# Patient Record
Sex: MALE | Race: BLACK OR AFRICAN AMERICAN | NOT HISPANIC OR LATINO | URBAN - METROPOLITAN AREA
[De-identification: names, ages, dates, MRNs, and addresses within clinical notes are randomized per-mention and may not be internally consistent; named-entity substitution may affect disease eponyms.]

---

## 2017-02-19 ENCOUNTER — EMERGENCY (EMERGENCY)
Facility: HOSPITAL | Age: 42
LOS: 0 days | Discharge: HOME | End: 2017-02-19

## 2017-06-27 DIAGNOSIS — I10 ESSENTIAL (PRIMARY) HYPERTENSION: ICD-10-CM

## 2017-06-27 DIAGNOSIS — M54.2 CERVICALGIA: ICD-10-CM

## 2017-06-27 DIAGNOSIS — M25.511 PAIN IN RIGHT SHOULDER: ICD-10-CM

## 2018-07-29 ENCOUNTER — EMERGENCY (EMERGENCY)
Facility: HOSPITAL | Age: 43
LOS: 0 days | Discharge: HOME | End: 2018-07-29
Admitting: PHYSICIAN ASSISTANT
Payer: SUBSIDIZED

## 2018-07-29 VITALS
OXYGEN SATURATION: 99 % | TEMPERATURE: 97 F | DIASTOLIC BLOOD PRESSURE: 118 MMHG | SYSTOLIC BLOOD PRESSURE: 187 MMHG | WEIGHT: 191.58 LBS | RESPIRATION RATE: 18 BRPM | HEART RATE: 74 BPM

## 2018-07-29 VITALS — DIASTOLIC BLOOD PRESSURE: 124 MMHG | SYSTOLIC BLOOD PRESSURE: 184 MMHG

## 2018-07-29 DIAGNOSIS — M79.605 PAIN IN LEFT LEG: ICD-10-CM

## 2018-07-29 DIAGNOSIS — M54.42 LUMBAGO WITH SCIATICA, LEFT SIDE: ICD-10-CM

## 2018-07-29 DIAGNOSIS — I10 ESSENTIAL (PRIMARY) HYPERTENSION: ICD-10-CM

## 2018-07-29 PROCEDURE — 93970 EXTREMITY STUDY: CPT | Mod: 26

## 2018-07-29 RX ORDER — IBUPROFEN 200 MG
1 TABLET ORAL
Qty: 20 | Refills: 0
Start: 2018-07-29 | End: 2018-08-02

## 2018-07-29 RX ORDER — METHOCARBAMOL 500 MG/1
1000 TABLET, FILM COATED ORAL ONCE
Qty: 0 | Refills: 0 | Status: COMPLETED | OUTPATIENT
Start: 2018-07-29 | End: 2018-07-29

## 2018-07-29 RX ORDER — METHOCARBAMOL 500 MG/1
2 TABLET, FILM COATED ORAL
Qty: 30 | Refills: 0
Start: 2018-07-29 | End: 2018-08-02

## 2018-07-29 RX ORDER — IBUPROFEN 200 MG
600 TABLET ORAL ONCE
Qty: 0 | Refills: 0 | Status: COMPLETED | OUTPATIENT
Start: 2018-07-29 | End: 2018-07-29

## 2018-07-29 RX ADMIN — METHOCARBAMOL 1000 MILLIGRAM(S): 500 TABLET, FILM COATED ORAL at 14:18

## 2018-07-29 RX ADMIN — Medication 600 MILLIGRAM(S): at 12:08

## 2018-07-29 RX ADMIN — Medication 600 MILLIGRAM(S): at 14:19

## 2018-07-29 NOTE — ED PROVIDER NOTE - PROGRESS NOTE DETAILS
Per preliminary vascular report: negative for DVT to b/l LE Patient states that he used to be on an antihypertensive medication however his PMD took him off per patient. He will f.u with his PMD to closely monitor BP; pt advised to keep a log at home ; asymptomatic.
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
## 2018-07-29 NOTE — ED PROVIDER NOTE - MUSCULOSKELETAL, MLM
+ left sided sciatic notch tenderness ; + left calf tenderness ; Spine appears normal, range of motion is not limited

## 2019-01-18 ENCOUNTER — EMERGENCY (EMERGENCY)
Facility: HOSPITAL | Age: 44
LOS: 0 days | Discharge: HOME | End: 2019-01-18
Admitting: PHYSICIAN ASSISTANT

## 2019-01-18 VITALS
DIASTOLIC BLOOD PRESSURE: 105 MMHG | SYSTOLIC BLOOD PRESSURE: 173 MMHG | RESPIRATION RATE: 18 BRPM | HEART RATE: 82 BPM | OXYGEN SATURATION: 99 % | TEMPERATURE: 98 F

## 2019-01-18 VITALS — SYSTOLIC BLOOD PRESSURE: 172 MMHG | DIASTOLIC BLOOD PRESSURE: 110 MMHG
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
## 2019-01-18 DIAGNOSIS — M79.89 OTHER SPECIFIED SOFT TISSUE DISORDERS: ICD-10-CM

## 2019-01-18 DIAGNOSIS — M25.561 PAIN IN RIGHT KNEE: ICD-10-CM
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
## 2019-01-18 DIAGNOSIS — Z79.899 OTHER LONG TERM (CURRENT) DRUG THERAPY: ICD-10-CM

## 2019-01-18 DIAGNOSIS — Z79.1 LONG TERM (CURRENT) USE OF NON-STEROIDAL ANTI-INFLAMMATORIES (NSAID): ICD-10-CM
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
## 2019-01-18 DIAGNOSIS — I10 ESSENTIAL (PRIMARY) HYPERTENSION: ICD-10-CM
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
## 2019-01-18 RX ORDER — KETOROLAC TROMETHAMINE 30 MG/ML
30 SYRINGE (ML) INJECTION ONCE
Qty: 0 | Refills: 0 | Status: DISCONTINUED | OUTPATIENT
Start: 2019-01-18 | End: 2019-01-18

## 2019-01-18 RX ADMIN — Medication 30 MILLIGRAM(S): at 19:29

## 2019-01-18 NOTE — ED PROVIDER NOTE - OBJECTIVE STATEMENT
43 y.o male with no sig Pmhx presents to the Ed for evaluation of right knee pain x 3 weeks.  Pt states that he twisted right knee at work 3 weeks ago,  Since then experiencing pain of right knee which is worse with ambulation and alleviated with rest. has taken motrin with some relief of the pain.  Adds no other complaints.  Denies warmth, overlying erythema, inability to range knee, hip pain, hx of IVDA.

## 2019-01-18 NOTE — ED PROVIDER NOTE - NSFOLLOWUPINSTRUCTIONS_ED_ALL_ED_FT
Sprain    A sprain is a stretch or tear in one of the tough, fiber-like tissues (ligaments) in your body. This is caused by an injury to the area such as a twisting mechanism. Symptoms include pain, swelling, or bruising. Rest that area over the next several days and slowly resume activity when tolerated. Ice can help with swelling and pain.     SEEK IMMEDIATE MEDICAL CARE IF YOU HAVE ANY OF THE FOLLOWING SYMPTOMS: worsening pain, inability to move that body part, numbness or tingling.    Follow up with your primary medical doctor in 1-2 days  follow up with ortho in 1-2 days.

## 2019-01-18 NOTE — ED PROVIDER NOTE - NSFOLLOWUPCLINICS_GEN_ALL_ED_FT
[facility]  Orthpedic  [address], [place]   Phone: [phone]  Fax:   Follow Up Time: 1-3 Days

## 2019-01-18 NOTE — ED PROVIDER NOTE - PHYSICAL EXAMINATION
CONST: Well appearing in NAD  EYES: Sclera and conjunctiva clear.  CARD: Normal S1 S2; Normal rate and rhythm  RESP: Equal BS B/L, No wheezes, rhonchi or rales. No distress  MS: + TTP of medial joint line of right knee, mild swelling, mild swelling of popliteal fossa, no overlying erythema, no pain with ROM of knee, no joint laxity, Normal ROM in all extremities. No edema of lower extremities, no calf pain, radial pulses 2+ bilaterally  SKIN: Warm, dry, no acute rashes. Good turgor  NEURO: Strength 5/5 with no sensory deficits. Steady gait

## 2019-01-18 NOTE — ED PROVIDER NOTE - CARE PROVIDER_API CALL
Ozzy Persaud), Orthopaedic Surgery  [facility] [place] [address]  Phone: [phone]  Fax: [phone]

## 2019-01-18 NOTE — ED ADULT NURSE NOTE - OBJECTIVE STATEMENT
pt Is a 43 y.o male p/w right knee pain x 3 weeks.  Pt states that he twisted right knee at work 3 weeks ago,  Since then experiencing pain of right knee which is worse with ambulation and alleviated with rest. has taken motrin with some relief of the pain.  Adds no other complaints.  Denies warmth, overlying erythema, inability to range knee, hip pain, hx of IVDA

## 2019-01-18 NOTE — ED PROVIDER NOTE - NS ED ROS FT
CONST: No fever, chills or bodyaches  MS: + right knee pain. No back pain, hip pain or foot pain.   SKIN: No rashes  NEURO: No headache, dizziness, paresthesias.

## 2019-01-18 NOTE — ED PROVIDER NOTE - CARE PROVIDERS DIRECT ADDRESSES
[email or web address]

## 2019-01-19 PROBLEM — I10 ESSENTIAL (PRIMARY) HYPERTENSION: Chronic | Status: ACTIVE | Noted: 2018-07-29

## 2019-01-20 NOTE — ED PROCEDURE NOTE - CPROC ED POST PROC CARE GUIDE1
Verbal/written post procedure instructions were given to patient/caregiver./Keep the cast/splint/dressing clean and dry./Instructed patient/caregiver regarding signs and symptoms of infection./Instructed patient/caregiver to follow-up with primary care physician./Elevate the injured extremity as instructed.

## 2019-01-25 ENCOUNTER — APPOINTMENT (OUTPATIENT)
Dept: INTERNAL MEDICINE | Facility: CLINIC | Age: 44
End: 2019-01-25

## 2019-03-01 ENCOUNTER — EMERGENCY (EMERGENCY)
Facility: HOSPITAL | Age: 44
LOS: 0 days | Discharge: HOME | End: 2019-03-01

## 2019-03-01 VITALS
DIASTOLIC BLOOD PRESSURE: 100 MMHG | HEART RATE: 77 BPM | SYSTOLIC BLOOD PRESSURE: 164 MMHG | TEMPERATURE: 97 F | RESPIRATION RATE: 18 BRPM | OXYGEN SATURATION: 96 %

## 2019-03-01 DIAGNOSIS — M25.561 PAIN IN RIGHT KNEE: ICD-10-CM

## 2019-03-01 DIAGNOSIS — Z79.2 LONG TERM (CURRENT) USE OF ANTIBIOTICS: ICD-10-CM
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
## 2019-03-01 DIAGNOSIS — S89.91XA UNSPECIFIED INJURY OF RIGHT LOWER LEG, INITIAL ENCOUNTER: ICD-10-CM
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
## 2019-03-01 RX ORDER — IBUPROFEN 200 MG
600 TABLET ORAL ONCE
Qty: 0 | Refills: 0 | Status: COMPLETED | OUTPATIENT
Start: 2019-03-01 | End: 2019-03-01

## 2019-03-01 RX ADMIN — Medication 600 MILLIGRAM(S): at 10:27

## 2019-03-01 NOTE — ED PROVIDER NOTE - PHYSICAL EXAMINATION
Physical Exam    Vital Signs: I have reviewed the initial vital signs.  Constitutional: well-nourished, appears stated age, no acute distress  Skin: warm, dry  Muskuloskeletal: right knee: +tender, swelling, pain with ROM. No warmth. n/v intact. Limping gait in ED  Neuro: AOx3, Motor 5/5, Sensation: equal and intact

## 2019-03-01 NOTE — ED PROVIDER NOTE - CARE PROVIDER_API CALL
Jorge Christian (MD)  Orthopaedic Surgery  [address] [place] [address]  Phone: [phone]  Fax: [phone]  Follow Up Time: 1-3 Days

## 2019-03-01 NOTE — ED PROVIDER NOTE - NSFOLLOWUPCLINICS_GEN_ALL_ED_FT
[facility]  Orthpedic  [address], [place]   Phone: [phone]  Fax:   Follow Up Time: 1-3 Days

## 2019-03-01 NOTE — ED PROVIDER NOTE - NS ED ROS FT
Constitutional: (-) fever  Skin: (-) rash  Muskuloskeletal: (+)right knee pain  Neurological: (-) altered mental status

## 2019-03-01 NOTE — ED PROVIDER NOTE - OBJECTIVE STATEMENT
44 yo M c/o constant, moderate, sharp pain to right knee pain after slipping and falling today. Pain worse with movement and ambulating. No pain or injury elsewhere.

## 2019-06-01 ENCOUNTER — OUTPATIENT (OUTPATIENT)
Dept: OUTPATIENT SERVICES | Facility: HOSPITAL | Age: 44
LOS: 1 days | End: 2019-06-01
Payer: MEDICAID

## 2019-06-01 PROCEDURE — G9001: CPT

## 2019-06-05 ENCOUNTER — INPATIENT (INPATIENT)
Facility: HOSPITAL | Age: 44
LOS: 0 days | Discharge: LEFT BEFORE DISCHARGE | End: 2019-06-06
Attending: INTERNAL MEDICINE | Admitting: INTERNAL MEDICINE
Payer: MEDICAID

## 2019-06-05 VITALS
DIASTOLIC BLOOD PRESSURE: 110 MMHG | RESPIRATION RATE: 18 BRPM | TEMPERATURE: 97 F | WEIGHT: 177.03 LBS | SYSTOLIC BLOOD PRESSURE: 190 MMHG | HEART RATE: 78 BPM | OXYGEN SATURATION: 100 %

## 2019-06-05 LAB
ALBUMIN SERPL ELPH-MCNC: 4.1 G/DL — SIGNIFICANT CHANGE UP (ref 3.5–5.2)
ALP SERPL-CCNC: 71 U/L — SIGNIFICANT CHANGE UP (ref 30–115)
ALT FLD-CCNC: 22 U/L — SIGNIFICANT CHANGE UP (ref 0–41)
ANION GAP SERPL CALC-SCNC: 11 MMOL/L — SIGNIFICANT CHANGE UP (ref 7–14)
APTT BLD: 33.4 SEC — SIGNIFICANT CHANGE UP (ref 27–39.2)
AST SERPL-CCNC: 23 U/L — SIGNIFICANT CHANGE UP (ref 0–41)
BASE EXCESS BLDV CALC-SCNC: 4.9 MMOL/L — HIGH (ref -2–2)
BASOPHILS # BLD AUTO: 0.01 K/UL — SIGNIFICANT CHANGE UP (ref 0–0.2)
BASOPHILS NFR BLD AUTO: 0.2 % — SIGNIFICANT CHANGE UP (ref 0–1)
BILIRUB SERPL-MCNC: 1.4 MG/DL — HIGH (ref 0.2–1.2)
BLD GP AB SCN SERPL QL: SIGNIFICANT CHANGE UP
BUN SERPL-MCNC: 15 MG/DL — SIGNIFICANT CHANGE UP (ref 10–20)
CA-I SERPL-SCNC: 1.22 MMOL/L — SIGNIFICANT CHANGE UP (ref 1.12–1.3)
CALCIUM SERPL-MCNC: 9.1 MG/DL — SIGNIFICANT CHANGE UP (ref 8.5–10.1)
CHLORIDE SERPL-SCNC: 106 MMOL/L — SIGNIFICANT CHANGE UP (ref 98–110)
CO2 SERPL-SCNC: 27 MMOL/L — SIGNIFICANT CHANGE UP (ref 17–32)
CREAT SERPL-MCNC: 1 MG/DL — SIGNIFICANT CHANGE UP (ref 0.7–1.5)
EOSINOPHIL # BLD AUTO: 0.09 K/UL — SIGNIFICANT CHANGE UP (ref 0–0.7)
EOSINOPHIL NFR BLD AUTO: 1.7 % — SIGNIFICANT CHANGE UP (ref 0–8)
GAS PNL BLDV: 145 MMOL/L — SIGNIFICANT CHANGE UP (ref 136–145)
GAS PNL BLDV: SIGNIFICANT CHANGE UP
GLUCOSE SERPL-MCNC: 80 MG/DL — SIGNIFICANT CHANGE UP (ref 70–99)
HCO3 BLDV-SCNC: 31 MMOL/L — HIGH (ref 22–29)
HCT VFR BLD CALC: 29.5 % — LOW (ref 42–52)
HCT VFR BLDA CALC: 30.9 % — LOW (ref 34–44)
HGB BLD CALC-MCNC: 10.1 G/DL — LOW (ref 14–18)
HGB BLD-MCNC: 9.4 G/DL — LOW (ref 14–18)
IMM GRANULOCYTES NFR BLD AUTO: 0.2 % — SIGNIFICANT CHANGE UP (ref 0.1–0.3)
INR BLD: 1.11 RATIO — SIGNIFICANT CHANGE UP (ref 0.65–1.3)
LACTATE BLDV-MCNC: 1.1 MMOL/L — SIGNIFICANT CHANGE UP (ref 0.5–1.6)
LYMPHOCYTES # BLD AUTO: 1.3 K/UL — SIGNIFICANT CHANGE UP (ref 1.2–3.4)
LYMPHOCYTES # BLD AUTO: 24.3 % — SIGNIFICANT CHANGE UP (ref 20.5–51.1)
MCHC RBC-ENTMCNC: 19.8 PG — LOW (ref 27–31)
MCHC RBC-ENTMCNC: 31.9 G/DL — LOW (ref 32–37)
MCV RBC AUTO: 62.2 FL — LOW (ref 80–94)
MONOCYTES # BLD AUTO: 0.37 K/UL — SIGNIFICANT CHANGE UP (ref 0.1–0.6)
MONOCYTES NFR BLD AUTO: 6.9 % — SIGNIFICANT CHANGE UP (ref 1.7–9.3)
NEUTROPHILS # BLD AUTO: 3.56 K/UL — SIGNIFICANT CHANGE UP (ref 1.4–6.5)
NEUTROPHILS NFR BLD AUTO: 66.7 % — SIGNIFICANT CHANGE UP (ref 42.2–75.2)
NRBC # BLD: 0 /100 WBCS — SIGNIFICANT CHANGE UP (ref 0–0)
PCO2 BLDV: 55 MMHG — HIGH (ref 41–51)
PH BLDV: 7.37 — SIGNIFICANT CHANGE UP (ref 7.26–7.43)
PLATELET # BLD AUTO: 163 K/UL — SIGNIFICANT CHANGE UP (ref 130–400)
PO2 BLDV: 22 MMHG — SIGNIFICANT CHANGE UP (ref 20–40)
POTASSIUM BLDV-SCNC: 3.6 MMOL/L — SIGNIFICANT CHANGE UP (ref 3.3–5.6)
POTASSIUM SERPL-MCNC: 3.9 MMOL/L — SIGNIFICANT CHANGE UP (ref 3.5–5)
POTASSIUM SERPL-SCNC: 3.9 MMOL/L — SIGNIFICANT CHANGE UP (ref 3.5–5)
PROT SERPL-MCNC: 7 G/DL — SIGNIFICANT CHANGE UP (ref 6–8)
PROTHROM AB SERPL-ACNC: 12.7 SEC — SIGNIFICANT CHANGE UP (ref 9.95–12.87)
RBC # BLD: 4.74 M/UL — SIGNIFICANT CHANGE UP (ref 4.7–6.1)
RBC # FLD: 19.2 % — HIGH (ref 11.5–14.5)
SAO2 % BLDV: 27 % — SIGNIFICANT CHANGE UP
SODIUM SERPL-SCNC: 144 MMOL/L — SIGNIFICANT CHANGE UP (ref 135–146)
TROPONIN T SERPL-MCNC: <0.01 NG/ML — SIGNIFICANT CHANGE UP
WBC # BLD: 5.34 K/UL — SIGNIFICANT CHANGE UP (ref 4.8–10.8)
WBC # FLD AUTO: 5.34 K/UL — SIGNIFICANT CHANGE UP (ref 4.8–10.8)

## 2019-06-05 PROCEDURE — 99291 CRITICAL CARE FIRST HOUR: CPT

## 2019-06-05 PROCEDURE — 93010 ELECTROCARDIOGRAM REPORT: CPT

## 2019-06-05 PROCEDURE — 70450 CT HEAD/BRAIN W/O DYE: CPT | Mod: 26

## 2019-06-05 PROCEDURE — 71045 X-RAY EXAM CHEST 1 VIEW: CPT | Mod: 26

## 2019-06-05 RX ORDER — ALTEPLASE 100 MG
7.2 KIT INTRAVENOUS ONCE
Refills: 0 | Status: COMPLETED | OUTPATIENT
Start: 2019-06-05 | End: 2019-06-05

## 2019-06-05 RX ORDER — LABETALOL HCL 100 MG
10 TABLET ORAL ONCE
Refills: 0 | Status: COMPLETED | OUTPATIENT
Start: 2019-06-05 | End: 2019-06-05

## 2019-06-05 RX ORDER — NICARDIPINE HYDROCHLORIDE 30 MG/1
5 CAPSULE, EXTENDED RELEASE ORAL
Qty: 40 | Refills: 0 | Status: DISCONTINUED | OUTPATIENT
Start: 2019-06-05 | End: 2019-06-06

## 2019-06-05 RX ORDER — ALTEPLASE 100 MG
64.8 KIT INTRAVENOUS ONCE
Refills: 0 | Status: COMPLETED | OUTPATIENT
Start: 2019-06-05 | End: 2019-06-05

## 2019-06-05 RX ADMIN — NICARDIPINE HYDROCHLORIDE 25 MG/HR: 30 CAPSULE, EXTENDED RELEASE ORAL at 21:49

## 2019-06-05 RX ADMIN — ALTEPLASE 432 MILLIGRAM(S): KIT at 21:52

## 2019-06-05 RX ADMIN — ALTEPLASE 64.8 MILLIGRAM(S): KIT at 21:55

## 2019-06-05 RX ADMIN — Medication 10 MILLIGRAM(S): at 21:50

## 2019-06-05 NOTE — ED PROVIDER NOTE - OBJECTIVE STATEMENT
43 y/o male h/o HTN, DM Type 2 p/w weakness 45 y/o male h/o HTN, DM Type 2 p/w dizziness, slurred speech, left facial/arm numbness, left arm and leg weakness. Dizziness started 1 hour PTA. Pt had potential syncopal episode 30 mins PTA -- he was playing with his son and his sister-in-law found him on the ground. Pt was confused, they brought him into the car and his mental status improved. Denies HA, vomiting, diarrhea, CP, SOB.

## 2019-06-05 NOTE — ED PROVIDER NOTE - CLINICAL SUMMARY MEDICAL DECISION MAKING FREE TEXT BOX
45 yo male DM HTN p/w left sided weakness, slurred speech. +cva, given tpa with neuro inconsultation, pt admitted to icu.

## 2019-06-05 NOTE — CONSULT NOTE ADULT - SUBJECTIVE AND OBJECTIVE BOX
***STROKE ALERT - TELESTROKE CONSULTATION  06-05-19 @ 21:43  Last known normal time:  20:00 EST  Consultant paged:	 20:49 EST    Video start:	             21:21 EST  Video end:	             21:56 EST  Video total:	             25 mins    This is a telehealth visit and the patient is being seen on 06/05/2019 using bi-directional audio/video at the request of Dr. Jang at [facility].  	                                                     Chief Complaint: stroke alert    Last known normal time:  20:00 EST    HPI:  45 yo RHM w/ h/o HTN reportedly compliant with medications currently p/w acute lightheadedness followed by transient LOC with LUE/LLE weakness/numbness.  Symptoms improving since being in the hospital but LUE weakness/numbness persists.  Denies any vertigo or ataxia.  Denies any prior h/o stroke.  No h/o ICH, intracranial aneurysm, tumor or bleeding diathesis.  Denies any hemoptysis or GIB.  Denies any nausea/vomiting.  No headache but reports 40 lb weight loss over the last several months.  Denies any IVDA or illicit drug use.           PAST MEDICAL & SURGICAL HISTORY:  HTN (hypertension)    FAMILY HISTORY:  (-) stroke, DVT/PE    Social History: (-) x 3    Allergies    No Known Allergies    Intolerances    MEDICATIONS  (STANDING):  alteplase    Bolus 7.2 milliGRAM(s) IV Bolus once  alteplase    IVPB 64.8 milliGRAM(s) IV Intermittent once  labetalol Injectable 10 milliGRAM(s) IV Push once    MEDICATIONS  (PRN):    Review of systems:    Constitutional: No fever, weight loss or fatigue    Eyes: No eye pain or discharge  ENMT:  No difficulty hearing; No sinus or throat pain  Neck: No pain or stiffness  Respiratory: No cough, wheezing, chills or hemoptysis  Cardiovascular: No chest pain, palpitations, shortness of breath, dyspnea on exertion  Gastrointestinal: No abdominal pain, nausea, vomiting or hematemesis; No diarrhea or constipation.   Genitourinary: No dysuria, frequency, hematuria or incontinence  Neurological: As per HPI  Skin: No rashes or lesions   Endocrine: No heat or cold intolerance; No hair loss  Musculoskeletal: No joint pain or swelling  Psychiatric: No depression, anxiety, mood swings  Heme/Lymph: No easy bruising or bleeding gums    Vital Signs Last 24 Hrs  T(C): 36.3 (05 Jun 2019 20:42), Max: 36.3 (05 Jun 2019 20:42)  T(F): 97.3 (05 Jun 2019 20:42), Max: 97.3 (05 Jun 2019 20:42)  HR: 79 (05 Jun 2019 21:23) (78 - 82)  BP: 197/118 (05 Jun 2019 21:23) (190/110 - 197/118)  BP(mean): --  RR: 18 (05 Jun 2019 21:23) (18 - 18)  SpO2: 99% (05 Jun 2019 21:23) (99% - 100%)    Video assisted telemedicine examination performed w/ RN and family at bedside.    Neurologic Examination:  General:  Appearance is consistent with chronologic age.  No abnormal facies.   General: The patient is oriented to person, place, time and date.   Fund of knowledge is intact and normal.  Language with normal repetition, comprehension and naming.  Nondysarthric.    Cranial nerves: intact VA, VFF.  EOMI w/o nystagmus, skew or reported double vision.  PERRL.  No ptosis/weakness of eyelid closure.  Facial sensation is normal with normal bite.  No facial asymmetry.  Hearing grossly intact b/l.  Palate elevates midline.  Tongue midline.  Motor examination:   Normal tone, bulk and range of motion.  No tenderness, twitching, tremors or involuntary movements.  Formal Muscle Strength Testing: (MRC grade R/L) 5/5 UE; 5/5 LE.  No observable drift.  Reflexes:   2+ b/l pectoralis, biceps, triceps, brachioradialis, patella and Achilles.  Plantar response downgoing b/l.  Jaw jerk, Love, clonus absent.  Sensory examination:   Intact to light touch and pinprick, pain, temperature and proprioception and vibration in all extremities.  Cerebellum:   FTN/HKS intact with normal LAUREL in all limbs.  No dysmetria or dysdiadokinesia.    NIH STROKE SCALE  Item	                                                        Score  1 a.	Level of Consciousness	                    0  1 b.       LOC Questions	                                0  1 c.	LOC Commands	                                0  2.	Best Gaze	                                0  3.	Visual	                                            0  4.	Facial Palsy	                                0  5 a.	Motor Arm - Left	                    2  5 b.	Motor Arm - Right	                    0  6 a.	Motor Leg - Left	                    0  6 b.	Motor Leg - Right	                    0  7.	Limb Ataxia	                                0  8.	Sensory	                                            1  9.	Language	                                0  10.	Dysarthria	                                0  11.	Extinction and Inattention  	        0  ______________________________________  TOTAL	                                                        3    Labs:   CBC Full  -  ( 05 Jun 2019 20:52 )  WBC Count : 5.34 K/uL  RBC Count : 4.74 M/uL  Hemoglobin : 9.4 g/dL  Hematocrit : 29.5 %  Platelet Count - Automated : 163 K/uL  Mean Cell Volume : 62.2 fL  Mean Cell Hemoglobin : 19.8 pg  Mean Cell Hemoglobin Concentration : 31.9 g/dL  Auto Neutrophil # : 3.56 K/uL  Auto Lymphocyte # : 1.30 K/uL  Auto Monocyte # : 0.37 K/uL  Auto Eosinophil # : 0.09 K/uL  Auto Basophil # : 0.01 K/uL  Auto Neutrophil % : 66.7 %  Auto Lymphocyte % : 24.3 %  Auto Monocyte % : 6.9 %  Auto Eosinophil % : 1.7 %  Auto Basophil % : 0.2 %    06-05    144  |  106  |  15  ----------------------------<  80  3.9   |  27  |  1.0    Ca    9.1      05 Jun 2019 20:52    TPro  7.0  /  Alb  4.1  /  TBili  1.4<H>  /  DBili  x   /  AST  23  /  ALT  22  /  AlkPhos  71  06-05    LIVER FUNCTIONS - ( 05 Jun 2019 20:52 )  Alb: 4.1 g/dL / Pro: 7.0 g/dL / ALK PHOS: 71 U/L / ALT: 22 U/L / AST: 23 U/L / GGT: x           PT/INR - ( 05 Jun 2019 20:52 )   PT: 12.70 sec;   INR: 1.11 ratio       PTT - ( 05 Jun 2019 20:52 )  PTT:33.4 sec    POCT Blood Glucose.: 83 mg/dL (06-05-19 @ 20:52)    Neuroimaging:  < from: CT Brain Stroke Protocol (06.05.19 @ 21:07) >  IMPRESSION:     No evidence of intracranial hemorrhage, territorial infarct, or mass   effect.    Dr. Abdullahi Carvajal reported preliminary results with Dr. Yves Starkey with read   back at 9:13 PM.     ABDULLAHI CARVAJAL M.D., RESIDENT RADIOLOGIST  This document has been electronically signed.  LINDA BARTLETT M.D., ATTENDING RADIOLOGIST  This document has been electronically signed. Jun 5 2019  9:22PM    < end of copied text >

## 2019-06-05 NOTE — STROKE CODE NOTE - OBJECTIVE
/110 requiring labetalol 10 mg ivp x 2 and then started Cardene drip for persistently high bp   Left arm drift   Left arm clumsiness  left arm sensory loss  NIHSS 4

## 2019-06-05 NOTE — ED PROVIDER NOTE - PROGRESS NOTE DETAILS
45 y/o male h/o DM, HTN p/w slurred speech, dizziness, left arm numbness weakness, LLE weakness, fall. Bystander found him down, ptdoesn't know how he fell. Galina from Neurology at bedside. FS 81. /118. neuro at bedside CTH negative. Dr Tovar spoke to pt regarding risks and benefits of tpa. Pt opted to receive tpa. labetalol 20mgivp given, cardene drip started. tpa bolus given once systolic <180. CT perfusion ordered as per Dr Tovar.  icu approved dorina muro. spoke to dr ernandez resident ATTENDING NOTE:   I personally evaluated the patient. I reviewed the Resident’s or Physician Assistant’s note (as assigned above), and agree with the findings and plan except as documented in my note.   45 y/o M with PMH of HTN, presents with LUE weakness that started 30 min PTA. Pt was playing with his son when he felt dizzy and had a syncopal episode which he does not remember. Pt woke up wth LUE weakness and slurred speech. Stroke code called upon arrival. On exam pt with slurred speech, LUE and LLE weakness, LUE also with numbness. Pt is hypertensive, otherwise agree with exam as above. Dr. Tovar via tele stroke regarding TPA. A/P: Will give TPA and labetalol, placed on cardipine drip. Pt admitted to ICU for stroke.

## 2019-06-05 NOTE — ED ADULT NURSE NOTE - CHIEF COMPLAINT QUOTE
patient biba for left arm weakness and syncope prior to arrival to the hospital. patient is  alert and oriented in triage.  pt denies hitting his head. Pt has left arm weakness in triage. No slurred speech, no facial drooping. Code stroke activated.

## 2019-06-05 NOTE — ED ADULT NURSE NOTE - OBJECTIVE STATEMENT
pt came to ED c/o weakness, was found on the floor by a bystander, pt does not know how he got on the floor. reports left sided upper and lower extremity weakness, tingling to left side of face.

## 2019-06-05 NOTE — ED ADULT TRIAGE NOTE - CHIEF COMPLAINT QUOTE
patient biba for left arm weakness and syncope prior to arrival to the hospital. patient is  alert and oriented. pt denies any blood thinners patient biba for left arm weakness and syncope prior to arrival to the hospital. patient is  alert and oriented in triage.  pt denies hitting his head. Pt has left arm weakness in triage. No slurred speech, no facial drooping. Code stroke activated.

## 2019-06-05 NOTE — CONSULT NOTE ADULT - ASSESSMENT
Assessment:  This is a 44y Male with h/o HTN currently p/w acute lightheadedness with LHP/numbness currently with persistent LUE weakness and numbness suspicious for lacunar infarct.  Pt is a candidate for thrombolysis with IV TPA within 3 hour window.  Risks and benefits including alternatives to treatment with thrombolysis with IV TPA (alteplase) discussed with patient/family at length including significant morbidity/mortality associated with treatment and acute stroke.  All questions and concerns at the time of evaluation answered and addressed.  Patient and family agreed to proceed with thrombolysis with IV TPA.  TPA initially held due to refractory elevated BP eventually controlled after labetalol x2 and cardene gtt.  BP at time of bolus 176/103.      Weight (kg): 80.3 (06-05-19 @ 20:42)  Treat with IV TPA:             Yes  IV TPA bolus time:            21:50  TPA total dose:                72 mg  TPA bolus dose:              7.2 mg  If NO IV TPA, then why:    Neurovascular intervention candidate:    ?  If NO intervention, then why:     Plan:   - TPA bolus 10% followed by 90% infusion over one hour  - serial neurochecks Q 10 minutes during infusion followed by Q2 hours neurochecks for next 24 hours  - no anticoagulation or antiplatelets  - mechanical DVT prophylaxis  - keep blood pressure <185/105 and > 110/60  - repeat head CT in 12 to 24 hours or if clinical deterioration  - admit to ICU/CCU  - MRI head NC  - Echocardiogram w/ bubble study  - cardiac telemetry  - check hypercoagulable labs, HgbA1c, lipid profile  - start Lipitor 80 mg QD  - PT/OT/speech/swallow  - stat CTA/CTP and if evidence of large vessel occlusion    06-05-19 @ 21:43

## 2019-06-05 NOTE — ED PROVIDER NOTE - NS ED ROS FT
Constitutional: No fever, chills.   Eyes:  No visual changes, eye pain or discharge.  ENMT:  No hearing changes, pain, no sore throat or runny nose, no difficulty swallowing  Cardiac:  No chest pain, SOB or edema. No chest pain with exertion.  Respiratory:  No cough or respiratory distress. No hemoptysis. No history of asthma or RAD.  GI:  No nausea, vomiting, diarrhea or abdominal pain.  :  No dysuria, frequency or burning.  MS:  No myalgia, muscle weakness, joint pain or back pain.  Neuro:  Dizziness. Left facial numbness, left arm numbness. Left arm weakness, left leg weakness. Difficulty ambulating. Slurred speech. Potential LOC. No headache, blurry vision, double vision, dysphagia.  No LOC.  Skin:  No skin rash.   Endocrine: No history of thyroid disease or diabetes.

## 2019-06-05 NOTE — ED PROVIDER NOTE - CRITICAL CARE PROVIDED
consult w/ pt's family directly relating to pts condition/direct patient care (not related to procedure)/documentation/additional history taking/consultation with other physicians/conducted a detailed discussion of DNR status/telephone consultation with the patient's family/interpretation of diagnostic studies

## 2019-06-05 NOTE — ED ADULT NURSE NOTE - NSIMPLEMENTINTERV_GEN_ALL_ED
Implemented All Fall with Harm Risk Interventions:  [place] to call system. Call bell, personal items and telephone within reach. Instruct patient to call for assistance. Room bathroom lighting operational. Non-slip footwear when patient is off stretcher. Physically safe environment: no spills, clutter or unnecessary equipment. Stretcher in lowest position, wheels locked, appropriate side rails in place. Provide visual cue, wrist band, yellow gown, etc. Monitor gait and stability. Monitor for mental status changes and reorient to person, place, and time. Review medications for side effects contributing to fall risk. Reinforce activity limits and safety measures with patient and family. Provide visual clues: red socks.

## 2019-06-05 NOTE — ED PROVIDER NOTE - PHYSICAL EXAMINATION
Constitutional: Well developed, well nourished. NAD. Good general hygiene  Head: Atraumatic.  Eyes: PERRLA. EOMI without discomfort.   ENT: No nasal discharge. Mucous membranes moist.  Neck: Supple. Painless ROM.  Cardiovascular: Regular rhythm. Regular rate. Normal S1 and S2. No murmurs. 2+ pulses in all extremities.   Pulmonary: Normal respiratory rate and effort. Lungs clear to auscultation bilaterally. No wheezing, rales, or rhonchi. Bilateral, equal lung expansion.   Abdominal: Soft. Nondistended. Nontender. No rebound or guarding.   Extremities. Pelvis stable. No lower extremity edema. Symmetric calves.  Skin: No rashes.   Neuro: AAOx3. Numbness left face. Sensation decreased left arm, LLE. 3/5 strength LUE, LLE. 5/5 strength RUE, RLE. No dysmetria, dysdiadochokinesia.   Psych: Normal mood. Normal affect.

## 2019-06-06 VITALS — HEART RATE: 68 BPM | SYSTOLIC BLOOD PRESSURE: 149 MMHG | DIASTOLIC BLOOD PRESSURE: 93 MMHG

## 2019-06-06 PROCEDURE — 93306 TTE W/DOPPLER COMPLETE: CPT | Mod: 26

## 2019-06-06 PROCEDURE — 99233 SBSQ HOSP IP/OBS HIGH 50: CPT

## 2019-06-06 PROCEDURE — 0042T: CPT

## 2019-06-06 RX ORDER — ATORVASTATIN CALCIUM 80 MG/1
1 TABLET, FILM COATED ORAL
Qty: 14 | Refills: 0
Start: 2019-06-06 | End: 2019-06-19

## 2019-06-06 RX ORDER — LOSARTAN POTASSIUM 100 MG/1
1 TABLET, FILM COATED ORAL
Qty: 14 | Refills: 0
Start: 2019-06-06 | End: 2019-06-19

## 2019-06-06 RX ORDER — ASPIRIN/CALCIUM CARB/MAGNESIUM 324 MG
1 TABLET ORAL
Qty: 14 | Refills: 0
Start: 2019-06-06 | End: 2019-06-19

## 2019-06-06 RX ORDER — LOSARTAN POTASSIUM 100 MG/1
25 TABLET, FILM COATED ORAL DAILY
Refills: 0 | Status: DISCONTINUED | OUTPATIENT
Start: 2019-06-06 | End: 2019-06-06

## 2019-06-06 RX ORDER — ACETAMINOPHEN 500 MG
650 TABLET ORAL ONCE
Refills: 0 | Status: COMPLETED | OUTPATIENT
Start: 2019-06-06 | End: 2019-06-06

## 2019-06-06 RX ORDER — ATORVASTATIN CALCIUM 80 MG/1
80 TABLET, FILM COATED ORAL AT BEDTIME
Refills: 0 | Status: DISCONTINUED | OUTPATIENT
Start: 2019-06-06 | End: 2019-06-06

## 2019-06-06 RX ORDER — CHLORHEXIDINE GLUCONATE 213 G/1000ML
1 SOLUTION TOPICAL
Refills: 0 | Status: DISCONTINUED | OUTPATIENT
Start: 2019-06-06 | End: 2019-06-06

## 2019-06-06 RX ORDER — ASPIRIN/CALCIUM CARB/MAGNESIUM 324 MG
81 TABLET ORAL DAILY
Refills: 0 | Status: DISCONTINUED | OUTPATIENT
Start: 2019-06-06 | End: 2019-06-06

## 2019-06-06 RX ORDER — AMLODIPINE BESYLATE 2.5 MG/1
1 TABLET ORAL
Qty: 14 | Refills: 0
Start: 2019-06-06 | End: 2019-06-19

## 2019-06-06 RX ORDER — AMLODIPINE BESYLATE 2.5 MG/1
2.5 TABLET ORAL DAILY
Refills: 0 | Status: DISCONTINUED | OUTPATIENT
Start: 2019-06-06 | End: 2019-06-06

## 2019-06-06 RX ADMIN — Medication 650 MILLIGRAM(S): at 12:00

## 2019-06-06 RX ADMIN — AMLODIPINE BESYLATE 2.5 MILLIGRAM(S): 2.5 TABLET ORAL at 12:04

## 2019-06-06 RX ADMIN — Medication 650 MILLIGRAM(S): at 10:30

## 2019-06-06 RX ADMIN — LOSARTAN POTASSIUM 25 MILLIGRAM(S): 100 TABLET, FILM COATED ORAL at 12:04

## 2019-06-06 NOTE — PROGRESS NOTE ADULT - SUBJECTIVE AND OBJECTIVE BOX
Neurology Follow up note  Patient currently being taken care of in CCU , he was a stroke code last evening at 8.42 pm for an acute onset dizziness fall , loc and left sided weakness. On arrival to ED he was an nihss of 4 with negative cth and was within the therapeutic window for tpa. S/P IV tpa (bolus was given at 9.51 pm, infusion started at 9.52 and completed at 10.52pm) with good response. Left arm weakness, pronator drift and ataxia has resolved he has  minor sensory deficit on the left arm. NIHSS is 1 on this exam. CT/ P was negative for any perfusion deficits or LVO.  He was hypertensive on arrival and was on Cardene drip his SBP is now well within target. He denies any acute problems at this time and reports feeling better. Hemodynamically stable.      Vital Signs Last 24 Hrs  T(C): 36.8 (06 Jun 2019 00:09), Max: 37.1 (05 Jun 2019 23:00)  T(F): 98.2 (06 Jun 2019 00:09), Max: 98.7 (05 Jun 2019 23:00)  HR: 68 (06 Jun 2019 03:00) (68 - 125)  BP: 140/84 (06 Jun 2019 03:00) (134/86 - 202/117)  BP(mean): 103 (06 Jun 2019 03:00) (98 - 129)  RR: 17 (06 Jun 2019 03:00) (16 - 29)  SpO2: 99% (06 Jun 2019 03:00) (99% - 100%)          Neurological Exam:   Mental status: Awake, alert and oriented x3.  Naming, repetition and comprehension intact.  Attention/concentration intact.  No dysarthria, no aphasia.    Cranial nerves:  pupils equally round and reactive to light, visual fields full, no nystagmus, extraocular muscles intact, V1 through V3 intact bilaterally and symmetric, face symmetric, hearing intact to finger rub, palate elevation symmetric, tongue was midline, sternocleidomastoid/shoulder shrug strength bilaterally 5/5.    Motor:  5/5 throughout/ no drift  Sensation: Mild sensory loss on the left  Coordination: No dysmetria or limb ataxia  Gait: deferred    NIHSS 1  loc  0  commands  0   questions  0  vf  0  gaze 0  face  0  RUE 0  RLE 0  LUE 0   LLE 0  Ataxia 0  sensory 1  speech 0  language 0  Extinction 0    mRs baseline 0  mRs current  1      Medications  atorvastatin 80 milliGRAM(s) Oral at bedtime  chlorhexidine 4% Liquid 1 Application(s) Topical <User Schedule>  niCARdipine Infusion 5 mG/Hr IV Continuous <Continuous>      Lab  Comprehensive Metabolic Panel (06.05.19 @ 20:52)    Sodium, Serum: 144 mmol/L    Potassium, Serum: 3.9 mmol/L    Chloride, Serum: 106 mmol/L    Carbon Dioxide, Serum: 27 mmol/L    Anion Gap, Serum: 11 mmol/L    Blood Urea Nitrogen, Serum: 15 mg/dL    Creatinine, Serum: 1.0 mg/dL    Glucose, Serum: 80 mg/dL    Calcium, Total Serum: 9.1 mg/dL    Protein Total, Serum: 7.0 g/dL    Albumin, Serum: 4.1 g/dL    Bilirubin Total, Serum: 1.4 mg/dL    Alkaline Phosphatase, Serum: 71 U/L    Aspartate Aminotransferase (AST/SGOT): 23 U/L    Alanine Aminotransferase (ALT/SGPT): 22 U/L    eGFR if Non : 90: Interpretative comment      Complete Blood Count + Automated Diff (06.05.19 @ 20:52)    WBC Count: 5.34 K/uL    RBC Count: 4.74 M/uL    Hemoglobin: 9.4 g/dL    Hematocrit: 29.5 %    Mean Cell Volume: 62.2 fL    Mean Cell Hemoglobin: 19.8 pg    Mean Cell Hemoglobin Conc: 31.9 g/dL    Red Cell Distrib Width: 19.2 %    Platelet Count - Automated: 163 K/uL    Auto Neutrophil #: 3.56 K/uL    Auto Lymphocyte #: 1.30 K/uL    Auto Monocyte #: 0.37 K/uL    Auto Eosinophil #: 0.09 K/uL    Auto Basophil #: 0.01 K/uL    Auto Neutrophil %: 66.7: Differential percentages must be correlated with absolute numbers for  clinical significance. %    Auto Lymphocyte %: 24.3 %    Auto Monocyte %: 6.9 %    Auto Eosinophil %: 1.7 %    Auto Basophil %: 0.2 %    Auto Immature Granulocyte %: 0.2 %    Nucleated RBC: 0 /100 WBCs        Radiology  < from: CT Perfusion w/ Maps w/ IV Cont (06.06.19 @ 00:16) >  Findings:     NECK:  Aorta: The visualized aortic arch is pain. The great vessel origins are   patent.    Right carotid: The right common, internal and external carotid arteries   are patent.    Left carotid: The left common, internal and carotid arteries are patent.    Posterior circulation: The vertebral arteries are patent bilaterally.    HEAD:  There is calcified atherosclerosis of the bilateral clinoid and   supraclinoid internal carotid arteries without significant stenosis. The   anterior and middle cerebral arteries are patent. The anterior   communicating artery is unremarkable.    The distal vertebral arteries demonstrate left dominance and are patent.   The basilar artery is patent. The posterior cerebral arteries are patent.    CT PERFUSION:    The cerebral blood volume and mean transit time as well as time to peak   images demonstrate no significant evidence of fixed or mismatched focal   perfusion deficit or surrounding ischemic penumbra to suggest acute   cerebral ischemia or infarct.      OTHER: Evaluation of the visualized upper lungs demonstrates no acute   consolidation. The thyroid gland is unremarkable. Mild degenerative   change of the cervical spine. Paranasal sinus disease.      IMPRESSION:     1.  No large vessel occlusion or significant stenosis of the vessels of   the head and neck.    2.  No evidence of focal perfusion deficit to suggest acute cerebral   ischemia.      < end of copied text >

## 2019-06-06 NOTE — H&P ADULT - NSHPREVIEWOFSYSTEMS_GEN_ALL_CORE
REVIEW OF SYSTEMS    CONSTITUTIONAL: weight loss  EYES/ENT: ++ dizziness . No visual changes;    NECK: No pain or stiffness. No cervical midline tenderness.  RESPIRATORY: No difficulty breathing, cough, wheezing.  CARDIOVASCULAR: No chest pain or palpitations  GASTROINTESTINAL: No abdominal or epigastric pain. No nausea, vomiting, or hematemesis. No diarrhea or constipation. No melena, BRBPR, or hematochezia.  GENITOURINARY: No dysuria, frequency or hematuria.  NEUROLOGICAL: JORDEN weakness and stumbling

## 2019-06-06 NOTE — SWALLOW BEDSIDE ASSESSMENT ADULT - SLP GENERAL OBSERVATIONS
Pt received sitting upright in bed, alert and oriented x4 w/ no c/o pain. +room air. Pt slightly impulsive, ?baseline

## 2019-06-06 NOTE — H&P ADULT - HISTORY OF PRESENT ILLNESS
43 yo male PMH HTN presented for acute lightheadedness followed by transient LOC with LUE/LLE weakness/numbness. Reports that he was playing with son and suddenly felt dizzy and weak and tumbled while walking then fell on the left side. He does not recall LOC but was witnessed by family. He reports diagnosis of HTN for a long time with BP in range of 180/90 without taking meds, and started taking a medication only 2 weeks ago which he doesn't recall the name. Also reports 40 lb weight loss over the last several months.     In ED had HTN emergency with NIHSS 3, CTH done negative for acute bleed so tPA bolus. gtt started.  Patient wanted to leave after being given tPA and was educated on associated risks so agreed to stay for observation.

## 2019-06-06 NOTE — PATIENT PROFILE ADULT - NSPROMUTPARTICIPCAREFT_GEN_A_NUR
in any way Inflammation Suggestive Of Cancer Camouflage Histology Text: There was a dense lymphocytic infiltrate which prevented adequate histologic evaluation of adjacent structures.

## 2019-06-06 NOTE — CONSULT NOTE ADULT - ASSESSMENT
IMPRESSION:  CVA  HO uncontrolled BP    CNS: Follow with neurology. Start ASA 24 hours after TPA. Continue with Lipitor MRI head.    HEENT: Oral care    PULMONARY:  HOB @ 45 degrees    CARDIOVASCULAR: Start on Amlodipine 10 mg and losartan. Wean off nicardipine.     GI:   Feeding as tolerated    RENAL:  Follow up lytes.  Correct as needed    INFECTIOUS DISEASE: Follow up cultures    HEMATOLOGICAL:  DVT prophylaxis.    ENDOCRINE:  Follow up FS.  Insulin protocol if needed. Follow TSH.    MUSCULOSKELETAL: Out of bed to chair.   Downgrade to floor. IMPRESSION:]    CVA?  HO uncontrolled BP    CNS: Follow with neurology. Start ASA 24 hours after TPA. Continue with Lipitor MRI head.    HEENT: Oral care    PULMONARY:  HOB @ 45 degrees    CARDIOVASCULAR: Start on Amlodipine 10 mg and losartan. Wean off nicardipine.     GI:   Feeding as tolerated    RENAL:  Follow up lytes.  Correct as needed    INFECTIOUS DISEASE: Follow up cultures    HEMATOLOGICAL:  DVT prophylaxis.    ENDOCRINE:  Follow up FS.  Insulin protocol if needed. Follow TSH.    MUSCULOSKELETAL: Out of bed to chair.   Downgrade to floor when off cardene  transfer to hospitalist service

## 2019-06-06 NOTE — SWALLOW BEDSIDE ASSESSMENT ADULT - SLP PERTINENT HISTORY OF CURRENT PROBLEM
Pt presented w/ acute lightheadedness, transient LOC w/ LUE/LLE weakness/numbness; pt adm w/ acute R stroke received TPA; PMHx: HTN, 40lb weight loss over last several months

## 2019-06-06 NOTE — H&P ADULT - ASSESSMENT
-Impression:  Acute right CVA without residual deficit  Hypertensive emergency    # Acute right CVA in setting of HTN emergency  - admit to CCU  - s/p tPA, neurochecks q15 for 2 hrs then per TPA protocol  - symptoms completely resolved per patient  - avoid Arterial sticks/ hurley/ NGT  - CTP prelim negative for acute large vessel blockage  - Lipitor 80/ lipid  profile/ A1c  - treat BP  - MRH NC, echo + bubble study  - PT/OT/SS    # HTN emergency:  - s/p 2 boluses of labetalol then on nicardipine gtt  - keep BP  gt 110/60 and lt 180/105 (received tPA)  - reconcile home meds with pharmacy in AM    # DVT ppx: scd  GI ppx not needed  Diet DASH  OOBTC  Full code

## 2019-06-06 NOTE — CONSULT NOTE ADULT - SUBJECTIVE AND OBJECTIVE BOX
Patient is a 44y old  Male who presents with a chief complaint of Acute right CVA (06 Jun 2019 03:59)      HPI:  45 yo male PMH HTN presented for acute lightheadedness followed by transient LOC with LUE/LLE weakness/numbness. Reports that he was playing with son and suddenly felt dizzy and weak and tumbled while walking then fell on the left side. He does not recall LOC but was witnessed by family. He reports diagnosis of HTN for a long time with BP in range of 180/90 without taking meds, and started taking a medication only 2 weeks ago which he doesn't recall the name. Also reports 40 lb weight loss over the last several months.     In ED had HTN emergency with NIHSS 3, CTH done negative for acute bleed so tPA bolus. gtt started.  Patient wanted to leave after being given tPA and was educated on associated risks so agreed to stay for observation. (06 Jun 2019 00:25)      PAST MEDICAL & SURGICAL HISTORY:  HTN (hypertension)      SOCIAL HX:   Smoking : No                        ETOH     : No                       Other    FAMILY HISTORY:  FH: type 2 diabetes  :  No known cardiovacular family hisotry     ROS:  See HPI     Allergies    No Known Allergies    Intolerances    PHYSICAL EXAM    ICU Vital Signs Last 24 Hrs  T(C): 36.3 (06 Jun 2019 08:00), Max: 37.1 (05 Jun 2019 23:00)  T(F): 97.4 (06 Jun 2019 08:00), Max: 98.7 (05 Jun 2019 23:00)  HR: 80 (06 Jun 2019 08:00) (68 - 125)  BP: 144/87 (06 Jun 2019 08:00) (134/86 - 202/117)  BP(mean): 106 (06 Jun 2019 08:00) (98 - 129)  ABP: --  ABP(mean): --  RR: 16 (06 Jun 2019 08:00) (16 - 29)  SpO2: 99% (06 Jun 2019 08:00) (97% - 100%)      General: In NAD   HEENT:  ROBERT              Lymphatic system: No cervical LN   Lungs: Bilateral BS  Cardiovascular: Regular  Gastrointestinal: Soft, Positive BS  Musculoskeletal: No clubbing.  Moves all extremities.  Full range of motion   Skin: Warm.  Intact  Neurological: No motor or sensory deficit       06-05-19 @ 07:01  -  06-06-19 @ 07:00  --------------------------------------------------------  IN:    niCARdipine Infusion: 238 mL    Oral Fluid: 120 mL  Total IN: 358 mL    OUT:    Voided: 3225 mL  Total OUT: 3225 mL    Total NET: -2867 mL      06-06-19 @ 07:01  -  06-06-19 @ 08:46  --------------------------------------------------------  IN:    niCARdipine Infusion: 25 mL  Total IN: 25 mL    OUT:  Total OUT: 0 mL    Total NET: 25 mL          LABS:                          9.4    5.34  )-----------( 163      ( 05 Jun 2019 20:52 )             29.5                                               06-05    144  |  106  |  15  ----------------------------<  80  3.9   |  27  |  1.0    Ca    9.1      05 Jun 2019 20:52    TPro  7.0  /  Alb  4.1  /  TBili  1.4<H>  /  DBili  x   /  AST  23  /  ALT  22  /  AlkPhos  71  06-05      PT/INR - ( 05 Jun 2019 20:52 )   PT: 12.70 sec;   INR: 1.11 ratio         PTT - ( 05 Jun 2019 20:52 )  PTT:33.4 sec                                           CARDIAC MARKERS ( 05 Jun 2019 20:52 )  x     / <0.01 ng/mL / x     / x     / x                                                LIVER FUNCTIONS - ( 05 Jun 2019 20:52 )  Alb: 4.1 g/dL / Pro: 7.0 g/dL / ALK PHOS: 71 U/L / ALT: 22 U/L / AST: 23 U/L / GGT: x                                                                                     X-Rays  : No opacity.                                                                                    ECHO    MEDICATIONS  (STANDING):  atorvastatin 80 milliGRAM(s) Oral at bedtime  chlorhexidine 4% Liquid 1 Application(s) Topical <User Schedule>  niCARdipine Infusion 5 mG/Hr (25 mL/Hr) IV Continuous <Continuous>    MEDICATIONS  (PRN): Patient is a 44y old  Male who presents with a chief complaint of l sided weakness (06 Jun 2019 03:59)      HPI:  45 yo male PMH HTN presented for acute lightheadedness followed by transient LOC with LUE/LLE weakness/numbness. Reports that he was playing with son and suddenly felt dizzy and weak and tumbled while walking then fell on the left side. He does not recall LOC but was witnessed by family. He reports diagnosis of HTN for a long time with BP in range of 180/90 without taking meds, and started taking a medication only 2 weeks ago which he doesn't recall the name. Also reports 40 lb weight loss over the last several months.     In ED had HTN emergency with NIHSS 3, CTH done negative for acute bleed so tPA bolus. gtt started.  Patient wanted to leave after being given tPA and was educated on associated risks so agreed to stay for observation. (06 Jun 2019 00:25)m still on nicardipine drip, feels better      PAST MEDICAL & SURGICAL HISTORY:  HTN (hypertension)      SOCIAL HX:   Smoking : No                        ETOH     : No                           FAMILY HISTORY:  FH: type 2 diabetes      ROS:  See HPI     Allergies    No Known Allergies    Intolerances    PHYSICAL EXAM    ICU Vital Signs Last 24 Hrs  T(C): 36.3 (06 Jun 2019 08:00), Max: 37.1 (05 Jun 2019 23:00)  T(F): 97.4 (06 Jun 2019 08:00), Max: 98.7 (05 Jun 2019 23:00)  HR: 80 (06 Jun 2019 08:00) (68 - 125)  BP: 144/87 (06 Jun 2019 08:00) (134/86 - 202/117)  BP(mean): 106 (06 Jun 2019 08:00) (98 - 129)  RR: 16 (06 Jun 2019 08:00) (16 - 29)  SpO2: 99% (06 Jun 2019 08:00) (97% - 100%)      General: In NAD   HEENT:  ROBERT              Lymphatic system: No cervical LN   Lungs: Bilateral BS  Cardiovascular: Regular  Gastrointestinal: Soft, Positive BS  Musculoskeletal: No clubbing.  Moves all extremities.  Full range of motion   Skin: Warm.  Intact  Neurological: No motor or sensory deficit (resolved symtpoms)      06-05-19 @ 07:01  -  06-06-19 @ 07:00  --------------------------------------------------------  IN:    niCARdipine Infusion: 238 mL    Oral Fluid: 120 mL  Total IN: 358 mL    OUT:    Voided: 3225 mL  Total OUT: 3225 mL    Total NET: -2867 mL      06-06-19 @ 07:01  -  06-06-19 @ 08:46  --------------------------------------------------------  IN:    niCARdipine Infusion: 25 mL  Total IN: 25 mL    OUT:  Total OUT: 0 mL    Total NET: 25 mL          LABS:                          9.4    5.34  )-----------( 163      ( 05 Jun 2019 20:52 )             29.5                                               06-05    144  |  106  |  15  ----------------------------<  80  3.9   |  27  |  1.0    Ca    9.1      05 Jun 2019 20:52    TPro  7.0  /  Alb  4.1  /  TBili  1.4<H>  /  DBili  x   /  AST  23  /  ALT  22  /  AlkPhos  71  06-05      PT/INR - ( 05 Jun 2019 20:52 )   PT: 12.70 sec;   INR: 1.11 ratio         PTT - ( 05 Jun 2019 20:52 )  PTT:33.4 sec                                           CARDIAC MARKERS ( 05 Jun 2019 20:52 )  x     / <0.01 ng/mL / x     / x     / x                                                LIVER FUNCTIONS - ( 05 Jun 2019 20:52 )  Alb: 4.1 g/dL / Pro: 7.0 g/dL / ALK PHOS: 71 U/L / ALT: 22 U/L / AST: 23 U/L / GGT: x                                                                                     X-Rays  : No opacity.                                                                                    ECHO    MEDICATIONS  (STANDING):  atorvastatin 80 milliGRAM(s) Oral at bedtime  chlorhexidine 4% Liquid 1 Application(s) Topical <User Schedule>  niCARdipine Infusion 5 mG/Hr (25 mL/Hr) IV Continuous <Continuous>    MEDICATIONS  (PRN):

## 2019-06-06 NOTE — DISCHARGE NOTE NURSING/CASE MANAGEMENT/SOCIAL WORK - NSDCDPATPORTLINK_GEN_ALL_CORE
You can access the [facility] Patient Portal, offered by [facility], by registering with the following website: [email or web address]

## 2019-06-06 NOTE — H&P ADULT - NSHPLABSRESULTS_GEN_ALL_CORE
EKG NSR    < from: CT Perfusion w/ Maps w/ IV Cont (06.06.19 @ 00:16) >     IMPRESSION:     1.  No large vessel occlusion or significant stenosis of the vessels of   the head and neck.    2.  No evidence of focal perfusion deficit to suggest acute cerebral   ischemia.        ******PRELIMINARY REPORT******    ******PRELIMINARY REPORT******          NOVA TAVERAS M.D., RESIDENT RADIOLOGIST    < end of copied text >        < end of copied text >

## 2019-06-06 NOTE — PROGRESS NOTE ADULT - ASSESSMENT
44y Male with h/o DM, HTN currently p/w acute lightheadedness, Fall brief LOC with LHP/ numbness . S/P IV TPA with good response NIHSS  4-> 3-> 1. Currently with mild sensory defit on the left arm. CT/P was negative for LVO or perfusion deficits.        Plan:   - Continue ccu monitoring  - Neuro checks q 1 hr  - no anticoagulation or antiplatelets  - mechanical DVT prophylaxis  - keep blood pressure <185/105 and > 110/60  - repeat head CT in 12 to 24 hours or sooner if clinical deterioration  - MRI head NC  - Echocardiogram w/ bubble study  - check hypercoagulable labs, HgbA1c, lipid profile  - Lipitor 80 mg QD  - PT/OT/speech/swallow  -Neuro attending note will follow

## 2019-06-06 NOTE — PROGRESS NOTE ADULT - ATTENDING COMMENTS
Patient seen and examined this AM and agree with above except as noted.  Patient has no complaints this morning and says he wants to leave.  Explained that he was treated for possible acute stroke with TPA and he should not leave within 24 hours and we need to do rest of his workup.  He said he will stay for a short time and leave if nothing was done soon.  Exam shows his NIHSS is 0 (however there was intermittent clumsiness in his left hand (so ?1)  mrankin 0    Plan as above

## 2019-06-06 NOTE — CHART NOTE - NSCHARTNOTEFT_GEN_A_CORE
PATIENT LEAVING AMA.       44y Male transferred to floor from ICU    Patient is a 44y old Male who presents with a chief complaint of Acute right CVA (06 Jun 2019 08:46)    The patient is currently admitted for the primary diagnosis of Stroke    The patient was admitted to the unit for 1 Day.    The patient was never intubated, and was never on pressors.    Indwelling vascular catheters: Removed    Urinary Catheter: NONE    Disposition: HOME    Code Status: FULL CODE    ICU COURSE OF EVENTS:  -------------------------------------------------------------------------------------------  45 yo male PMH HTN presented for acute lightheadedness followed by transient LOC with LUE/LLE weakness/numbness. Reports that he was playing with son and suddenly felt dizzy and weak and tumbled while walking then fell on the left side. He does not recall LOC but was witnessed by family. He reports diagnosis of HTN for a long time with BP in range of 180/90 without taking meds, and started taking a medication only 2 weeks ago which he doesn't recall the name. Also reports 40 lb weight loss over the last several months.     In ED had HTN emergency with NIHSS 3, CTH done negative for acute bleed so tPA bolus. gtt started.  Patient wanted to leave after being given tPA and was educated on associated risks so agreed to stay for observation.    Patient asymptomatic this AM.     The risks of leaving AMA after TPA administration were thoroughly discussed with patient who displayed complete understanding; medication was sent to his pharmacy for HTN, statin, and aspirin to be started tomorrow. Patient will follow up with his PCP.     ICU Vital Signs Last 24 Hrs  T(C): 36.1 (06 Jun 2019 12:00), Max: 37.1 (05 Jun 2019 23:00)  T(F): 97 (06 Jun 2019 12:00), Max: 98.7 (05 Jun 2019 23:00)  HR: 68 (06 Jun 2019 13:10) (60 - 125)  BP: 149/93 (06 Jun 2019 13:10) (134/86 - 202/117)  BP(mean): 120 (06 Jun 2019 13:10) (98 - 129)  ABP: --  ABP(mean): --  RR: 20 (06 Jun 2019 12:00) (16 - 29)  SpO2: 98% (06 Jun 2019 12:00) (97% - 100%)      -----------------------------------------------------------------------  AMA paperwork signed.

## 2019-06-07 DIAGNOSIS — Z71.89 OTHER SPECIFIED COUNSELING: ICD-10-CM

## 2019-06-07 RX ORDER — AMLODIPINE BESYLATE 2.5 MG/1
1 TABLET ORAL
Qty: 0 | Refills: 0 | DISCHARGE
Start: 2019-06-07 | End: 2019-06-20

## 2019-06-11 DIAGNOSIS — Z83.3 FAMILY HISTORY OF DIABETES MELLITUS: ICD-10-CM

## 2019-06-11 DIAGNOSIS — I10 ESSENTIAL (PRIMARY) HYPERTENSION: ICD-10-CM

## 2019-06-11 DIAGNOSIS — R29.703 NIHSS SCORE 3: ICD-10-CM

## 2019-06-11 DIAGNOSIS — G81.94 HEMIPLEGIA, UNSPECIFIED AFFECTING LEFT NONDOMINANT SIDE: ICD-10-CM

## 2019-06-11 DIAGNOSIS — I63.9 CEREBRAL INFARCTION, UNSPECIFIED: ICD-10-CM

## 2019-06-11 DIAGNOSIS — R63.4 ABNORMAL WEIGHT LOSS: ICD-10-CM

## 2019-06-11 DIAGNOSIS — I16.1 HYPERTENSIVE EMERGENCY: ICD-10-CM

## 2019-06-11 DIAGNOSIS — R47.81 SLURRED SPEECH: ICD-10-CM

## 2019-08-10 ENCOUNTER — INPATIENT (INPATIENT)
Facility: HOSPITAL | Age: 44
LOS: 4 days | Discharge: HOME | End: 2019-08-15
Attending: SURGERY | Admitting: SURGERY
Payer: MEDICAID

## 2019-08-10 VITALS
TEMPERATURE: 98 F | SYSTOLIC BLOOD PRESSURE: 135 MMHG | DIASTOLIC BLOOD PRESSURE: 78 MMHG | OXYGEN SATURATION: 98 % | HEART RATE: 85 BPM | RESPIRATION RATE: 18 BRPM

## 2019-08-10 LAB
ALBUMIN SERPL ELPH-MCNC: 3.8 G/DL — SIGNIFICANT CHANGE UP (ref 3.5–5.2)
ALBUMIN SERPL ELPH-MCNC: 4.2 G/DL — SIGNIFICANT CHANGE UP (ref 3.5–5.2)
ALP SERPL-CCNC: 73 U/L — SIGNIFICANT CHANGE UP (ref 30–115)
ALP SERPL-CCNC: 80 U/L — SIGNIFICANT CHANGE UP (ref 30–115)
ALT FLD-CCNC: 12 U/L — SIGNIFICANT CHANGE UP (ref 0–41)
ALT FLD-CCNC: 14 U/L — SIGNIFICANT CHANGE UP (ref 0–41)
ANION GAP SERPL CALC-SCNC: 12 MMOL/L — SIGNIFICANT CHANGE UP (ref 7–14)
ANION GAP SERPL CALC-SCNC: 16 MMOL/L — HIGH (ref 7–14)
ANISOCYTOSIS BLD QL: SIGNIFICANT CHANGE UP
APPEARANCE UR: ABNORMAL
APTT BLD: 20.8 SEC — CRITICAL LOW (ref 27–39.2)
AST SERPL-CCNC: 12 U/L — SIGNIFICANT CHANGE UP (ref 0–41)
AST SERPL-CCNC: 27 U/L — SIGNIFICANT CHANGE UP (ref 0–41)
BASE EXCESS BLDV CALC-SCNC: 3 MMOL/L — HIGH (ref -2–2)
BASOPHILS # BLD AUTO: 0 K/UL — SIGNIFICANT CHANGE UP (ref 0–0.2)
BASOPHILS # BLD AUTO: 0.02 K/UL — SIGNIFICANT CHANGE UP (ref 0–0.2)
BASOPHILS NFR BLD AUTO: 0 % — SIGNIFICANT CHANGE UP (ref 0–1)
BASOPHILS NFR BLD AUTO: 0.1 % — SIGNIFICANT CHANGE UP (ref 0–1)
BILIRUB SERPL-MCNC: 1 MG/DL — SIGNIFICANT CHANGE UP (ref 0.2–1.2)
BILIRUB SERPL-MCNC: 1.3 MG/DL — HIGH (ref 0.2–1.2)
BILIRUB UR-MCNC: NEGATIVE — SIGNIFICANT CHANGE UP
BLD GP AB SCN SERPL QL: SIGNIFICANT CHANGE UP
BUN SERPL-MCNC: 16 MG/DL — SIGNIFICANT CHANGE UP (ref 10–20)
BUN SERPL-MCNC: 18 MG/DL — SIGNIFICANT CHANGE UP (ref 10–20)
BURR CELLS BLD QL SMEAR: PRESENT — SIGNIFICANT CHANGE UP
CA-I SERPL-SCNC: 1.19 MMOL/L — SIGNIFICANT CHANGE UP (ref 1.12–1.3)
CALCIUM SERPL-MCNC: 9.1 MG/DL — SIGNIFICANT CHANGE UP (ref 8.5–10.1)
CALCIUM SERPL-MCNC: 9.4 MG/DL — SIGNIFICANT CHANGE UP (ref 8.5–10.1)
CHLORIDE SERPL-SCNC: 100 MMOL/L — SIGNIFICANT CHANGE UP (ref 98–110)
CHLORIDE SERPL-SCNC: 98 MMOL/L — SIGNIFICANT CHANGE UP (ref 98–110)
CK MB CFR SERPL CALC: <1 NG/ML — SIGNIFICANT CHANGE UP (ref 0.6–6.3)
CK SERPL-CCNC: 50 U/L — SIGNIFICANT CHANGE UP (ref 0–225)
CO2 SERPL-SCNC: 23 MMOL/L — SIGNIFICANT CHANGE UP (ref 17–32)
CO2 SERPL-SCNC: 26 MMOL/L — SIGNIFICANT CHANGE UP (ref 17–32)
COLOR SPEC: SIGNIFICANT CHANGE UP
CREAT SERPL-MCNC: 1.2 MG/DL — SIGNIFICANT CHANGE UP (ref 0.7–1.5)
CREAT SERPL-MCNC: 1.5 MG/DL — SIGNIFICANT CHANGE UP (ref 0.7–1.5)
DIFF PNL FLD: NEGATIVE — SIGNIFICANT CHANGE UP
EOSINOPHIL # BLD AUTO: 0 K/UL — SIGNIFICANT CHANGE UP (ref 0–0.7)
EOSINOPHIL # BLD AUTO: 0 K/UL — SIGNIFICANT CHANGE UP (ref 0–0.7)
EOSINOPHIL NFR BLD AUTO: 0 % — SIGNIFICANT CHANGE UP (ref 0–8)
EOSINOPHIL NFR BLD AUTO: 0 % — SIGNIFICANT CHANGE UP (ref 0–8)
GAS PNL BLDV: 136 MMOL/L — SIGNIFICANT CHANGE UP (ref 136–145)
GAS PNL BLDV: SIGNIFICANT CHANGE UP
GIANT PLATELETS BLD QL SMEAR: PRESENT — SIGNIFICANT CHANGE UP
GLUCOSE BLDC GLUCOMTR-MCNC: 83 MG/DL — SIGNIFICANT CHANGE UP (ref 70–99)
GLUCOSE SERPL-MCNC: 149 MG/DL — HIGH (ref 70–99)
GLUCOSE SERPL-MCNC: 93 MG/DL — SIGNIFICANT CHANGE UP (ref 70–99)
GLUCOSE UR QL: NEGATIVE — SIGNIFICANT CHANGE UP
HCO3 BLDV-SCNC: 30 MMOL/L — HIGH (ref 22–29)
HCT VFR BLD CALC: 27.6 % — LOW (ref 42–52)
HCT VFR BLD CALC: 30.8 % — LOW (ref 42–52)
HCT VFR BLDA CALC: 27.6 % — LOW (ref 34–44)
HGB BLD CALC-MCNC: 9 G/DL — LOW (ref 14–18)
HGB BLD-MCNC: 8.3 G/DL — LOW (ref 14–18)
HGB BLD-MCNC: 9.2 G/DL — LOW (ref 14–18)
HYALINE CASTS # UR AUTO: ABNORMAL /LPF
HYPOCHROMIA BLD QL: SIGNIFICANT CHANGE UP
IMM GRANULOCYTES NFR BLD AUTO: 0.4 % — HIGH (ref 0.1–0.3)
INR BLD: 1.32 RATIO — HIGH (ref 0.65–1.3)
KETONES UR-MCNC: NEGATIVE — SIGNIFICANT CHANGE UP
LACTATE BLDV-MCNC: 2.4 MMOL/L — HIGH (ref 0.5–1.6)
LEUKOCYTE ESTERASE UR-ACNC: NEGATIVE — SIGNIFICANT CHANGE UP
LIDOCAIN IGE QN: 29 U/L — SIGNIFICANT CHANGE UP (ref 7–60)
LYMPHOCYTES # BLD AUTO: 0.34 K/UL — LOW (ref 1.2–3.4)
LYMPHOCYTES # BLD AUTO: 1.07 K/UL — LOW (ref 1.2–3.4)
LYMPHOCYTES # BLD AUTO: 1.7 % — LOW (ref 20.5–51.1)
LYMPHOCYTES # BLD AUTO: 7.7 % — LOW (ref 20.5–51.1)
MANUAL SMEAR VERIFICATION: SIGNIFICANT CHANGE UP
MCHC RBC-ENTMCNC: 18.9 PG — LOW (ref 27–31)
MCHC RBC-ENTMCNC: 18.9 PG — LOW (ref 27–31)
MCHC RBC-ENTMCNC: 29.9 G/DL — LOW (ref 32–37)
MCHC RBC-ENTMCNC: 30.1 G/DL — LOW (ref 32–37)
MCV RBC AUTO: 62.7 FL — LOW (ref 80–94)
MCV RBC AUTO: 63.4 FL — LOW (ref 80–94)
MICROCYTES BLD QL: SLIGHT — SIGNIFICANT CHANGE UP
MONOCYTES # BLD AUTO: 0.87 K/UL — HIGH (ref 0.1–0.6)
MONOCYTES # BLD AUTO: 1.19 K/UL — HIGH (ref 0.1–0.6)
MONOCYTES NFR BLD AUTO: 4.4 % — SIGNIFICANT CHANGE UP (ref 1.7–9.3)
MONOCYTES NFR BLD AUTO: 8.6 % — SIGNIFICANT CHANGE UP (ref 1.7–9.3)
NEUTROPHILS # BLD AUTO: 11.51 K/UL — HIGH (ref 1.4–6.5)
NEUTROPHILS # BLD AUTO: 19.01 K/UL — HIGH (ref 1.4–6.5)
NEUTROPHILS NFR BLD AUTO: 83.2 % — HIGH (ref 42.2–75.2)
NEUTROPHILS NFR BLD AUTO: 93.9 % — HIGH (ref 42.2–75.2)
NITRITE UR-MCNC: NEGATIVE — SIGNIFICANT CHANGE UP
NRBC # BLD: 0 /100 WBCS — SIGNIFICANT CHANGE UP (ref 0–0)
NRBC # BLD: 1 /100 — HIGH (ref 0–0)
NRBC # BLD: SIGNIFICANT CHANGE UP /100 WBCS (ref 0–0)
PCO2 BLDV: 56 MMHG — HIGH (ref 41–51)
PH BLDV: 7.33 — SIGNIFICANT CHANGE UP (ref 7.26–7.43)
PH UR: 6 — SIGNIFICANT CHANGE UP (ref 5–8)
PLAT MORPH BLD: NORMAL — SIGNIFICANT CHANGE UP
PLATELET # BLD AUTO: 199 K/UL — SIGNIFICANT CHANGE UP (ref 130–400)
PLATELET # BLD AUTO: 244 K/UL — SIGNIFICANT CHANGE UP (ref 130–400)
PO2 BLDV: 43 MMHG — HIGH (ref 20–40)
POIKILOCYTOSIS BLD QL AUTO: SLIGHT — SIGNIFICANT CHANGE UP
POLYCHROMASIA BLD QL SMEAR: SLIGHT — SIGNIFICANT CHANGE UP
POTASSIUM BLDV-SCNC: 4.9 MMOL/L — SIGNIFICANT CHANGE UP (ref 3.3–5.6)
POTASSIUM SERPL-MCNC: 4 MMOL/L — SIGNIFICANT CHANGE UP (ref 3.5–5)
POTASSIUM SERPL-MCNC: 4.7 MMOL/L — SIGNIFICANT CHANGE UP (ref 3.5–5)
POTASSIUM SERPL-SCNC: 4 MMOL/L — SIGNIFICANT CHANGE UP (ref 3.5–5)
POTASSIUM SERPL-SCNC: 4.7 MMOL/L — SIGNIFICANT CHANGE UP (ref 3.5–5)
PROT SERPL-MCNC: 7.3 G/DL — SIGNIFICANT CHANGE UP (ref 6–8)
PROT SERPL-MCNC: 7.9 G/DL — SIGNIFICANT CHANGE UP (ref 6–8)
PROT UR-MCNC: 100
PROTHROM AB SERPL-ACNC: 15.1 SEC — HIGH (ref 9.95–12.87)
RBC # BLD: 4.4 M/UL — LOW (ref 4.7–6.1)
RBC # BLD: 4.86 M/UL — SIGNIFICANT CHANGE UP (ref 4.7–6.1)
RBC # FLD: 17.1 % — HIGH (ref 11.5–14.5)
RBC # FLD: 17.4 % — HIGH (ref 11.5–14.5)
RBC BLD AUTO: ABNORMAL
SAO2 % BLDV: 70 % — SIGNIFICANT CHANGE UP
SODIUM SERPL-SCNC: 137 MMOL/L — SIGNIFICANT CHANGE UP (ref 135–146)
SODIUM SERPL-SCNC: 138 MMOL/L — SIGNIFICANT CHANGE UP (ref 135–146)
SP GR SPEC: >=1.03 — SIGNIFICANT CHANGE UP (ref 1.01–1.03)
TARGETS BLD QL SMEAR: SLIGHT — SIGNIFICANT CHANGE UP
TROPONIN T SERPL-MCNC: <0.01 NG/ML — SIGNIFICANT CHANGE UP
UROBILINOGEN FLD QL: 1 (ref 0.2–0.2)
WBC # BLD: 13.85 K/UL — HIGH (ref 4.8–10.8)
WBC # BLD: 19.83 K/UL — HIGH (ref 4.8–10.8)
WBC # FLD AUTO: 13.85 K/UL — HIGH (ref 4.8–10.8)
WBC # FLD AUTO: 19.83 K/UL — HIGH (ref 4.8–10.8)

## 2019-08-10 PROCEDURE — 70450 CT HEAD/BRAIN W/O DYE: CPT | Mod: 26

## 2019-08-10 PROCEDURE — 71045 X-RAY EXAM CHEST 1 VIEW: CPT | Mod: 26

## 2019-08-10 PROCEDURE — 93010 ELECTROCARDIOGRAM REPORT: CPT

## 2019-08-10 PROCEDURE — 99285 EMERGENCY DEPT VISIT HI MDM: CPT

## 2019-08-10 PROCEDURE — 74174 CTA ABD&PLVS W/CONTRAST: CPT | Mod: 26

## 2019-08-10 PROCEDURE — 74177 CT ABD & PELVIS W/CONTRAST: CPT | Mod: 26,59

## 2019-08-10 PROCEDURE — 99222 1ST HOSP IP/OBS MODERATE 55: CPT

## 2019-08-10 RX ORDER — LABETALOL HCL 100 MG
10 TABLET ORAL ONCE
Refills: 0 | Status: COMPLETED | OUTPATIENT
Start: 2019-08-10 | End: 2019-08-10

## 2019-08-10 RX ORDER — PANTOPRAZOLE SODIUM 20 MG/1
40 TABLET, DELAYED RELEASE ORAL DAILY
Refills: 0 | Status: DISCONTINUED | OUTPATIENT
Start: 2019-08-10 | End: 2019-08-11

## 2019-08-10 RX ORDER — MORPHINE SULFATE 50 MG/1
2 CAPSULE, EXTENDED RELEASE ORAL EVERY 4 HOURS
Refills: 0 | Status: DISCONTINUED | OUTPATIENT
Start: 2019-08-10 | End: 2019-08-11

## 2019-08-10 RX ORDER — CHLORHEXIDINE GLUCONATE 213 G/1000ML
1 SOLUTION TOPICAL DAILY
Refills: 0 | Status: DISCONTINUED | OUTPATIENT
Start: 2019-08-10 | End: 2019-08-15

## 2019-08-10 RX ORDER — SODIUM CHLORIDE 9 MG/ML
1000 INJECTION, SOLUTION INTRAVENOUS ONCE
Refills: 0 | Status: COMPLETED | OUTPATIENT
Start: 2019-08-10 | End: 2019-08-10

## 2019-08-10 RX ORDER — SODIUM CHLORIDE 9 MG/ML
1000 INJECTION, SOLUTION INTRAVENOUS
Refills: 0 | Status: DISCONTINUED | OUTPATIENT
Start: 2019-08-10 | End: 2019-08-10

## 2019-08-10 RX ORDER — SODIUM CHLORIDE 9 MG/ML
1000 INJECTION INTRAMUSCULAR; INTRAVENOUS; SUBCUTANEOUS
Refills: 0 | Status: DISCONTINUED | OUTPATIENT
Start: 2019-08-10 | End: 2019-08-10

## 2019-08-10 RX ORDER — MORPHINE SULFATE 50 MG/1
4 CAPSULE, EXTENDED RELEASE ORAL ONCE
Refills: 0 | Status: DISCONTINUED | OUTPATIENT
Start: 2019-08-10 | End: 2019-08-10

## 2019-08-10 RX ORDER — DEXTROSE 50 % IN WATER 50 %
25 SYRINGE (ML) INTRAVENOUS ONCE
Refills: 0 | Status: DISCONTINUED | OUTPATIENT
Start: 2019-08-10 | End: 2019-08-15

## 2019-08-10 RX ORDER — DEXTROSE 50 % IN WATER 50 %
12.5 SYRINGE (ML) INTRAVENOUS ONCE
Refills: 0 | Status: DISCONTINUED | OUTPATIENT
Start: 2019-08-10 | End: 2019-08-13

## 2019-08-10 RX ORDER — SODIUM CHLORIDE 9 MG/ML
1000 INJECTION INTRAMUSCULAR; INTRAVENOUS; SUBCUTANEOUS
Refills: 0 | Status: DISCONTINUED | OUTPATIENT
Start: 2019-08-10 | End: 2019-08-11

## 2019-08-10 RX ORDER — ONDANSETRON 8 MG/1
4 TABLET, FILM COATED ORAL ONCE
Refills: 0 | Status: COMPLETED | OUTPATIENT
Start: 2019-08-10 | End: 2019-08-10

## 2019-08-10 RX ORDER — CEFEPIME 1 G/1
2000 INJECTION, POWDER, FOR SOLUTION INTRAMUSCULAR; INTRAVENOUS ONCE
Refills: 0 | Status: COMPLETED | OUTPATIENT
Start: 2019-08-10 | End: 2019-08-10

## 2019-08-10 RX ORDER — SODIUM CHLORIDE 9 MG/ML
1000 INJECTION, SOLUTION INTRAVENOUS
Refills: 0 | Status: DISCONTINUED | OUTPATIENT
Start: 2019-08-10 | End: 2019-08-13

## 2019-08-10 RX ORDER — ONDANSETRON 8 MG/1
4 TABLET, FILM COATED ORAL EVERY 4 HOURS
Refills: 0 | Status: DISCONTINUED | OUTPATIENT
Start: 2019-08-10 | End: 2019-08-11

## 2019-08-10 RX ORDER — MORPHINE SULFATE 50 MG/1
2 CAPSULE, EXTENDED RELEASE ORAL ONCE
Refills: 0 | Status: DISCONTINUED | OUTPATIENT
Start: 2019-08-10 | End: 2019-08-10

## 2019-08-10 RX ORDER — DEXTROSE 50 % IN WATER 50 %
25 SYRINGE (ML) INTRAVENOUS ONCE
Refills: 0 | Status: DISCONTINUED | OUTPATIENT
Start: 2019-08-10 | End: 2019-08-13

## 2019-08-10 RX ORDER — DEXTROSE 50 % IN WATER 50 %
15 SYRINGE (ML) INTRAVENOUS ONCE
Refills: 0 | Status: DISCONTINUED | OUTPATIENT
Start: 2019-08-10 | End: 2019-08-13

## 2019-08-10 RX ORDER — PANTOPRAZOLE SODIUM 20 MG/1
20 TABLET, DELAYED RELEASE ORAL DAILY
Refills: 0 | Status: DISCONTINUED | OUTPATIENT
Start: 2019-08-10 | End: 2019-08-10

## 2019-08-10 RX ORDER — METRONIDAZOLE 500 MG
500 TABLET ORAL ONCE
Refills: 0 | Status: COMPLETED | OUTPATIENT
Start: 2019-08-10 | End: 2019-08-10

## 2019-08-10 RX ORDER — GLUCAGON INJECTION, SOLUTION 0.5 MG/.1ML
1 INJECTION, SOLUTION SUBCUTANEOUS ONCE
Refills: 0 | Status: DISCONTINUED | OUTPATIENT
Start: 2019-08-10 | End: 2019-08-15

## 2019-08-10 RX ORDER — INSULIN LISPRO 100/ML
VIAL (ML) SUBCUTANEOUS EVERY 6 HOURS
Refills: 0 | Status: DISCONTINUED | OUTPATIENT
Start: 2019-08-10 | End: 2019-08-13

## 2019-08-10 RX ADMIN — SODIUM CHLORIDE 250 MILLILITER(S): 9 INJECTION, SOLUTION INTRAVENOUS at 16:03

## 2019-08-10 RX ADMIN — ONDANSETRON 4 MILLIGRAM(S): 8 TABLET, FILM COATED ORAL at 11:24

## 2019-08-10 RX ADMIN — SODIUM CHLORIDE 75 MILLILITER(S): 9 INJECTION INTRAMUSCULAR; INTRAVENOUS; SUBCUTANEOUS at 18:11

## 2019-08-10 RX ADMIN — Medication 10 MILLIGRAM(S): at 23:30

## 2019-08-10 RX ADMIN — MORPHINE SULFATE 2 MILLIGRAM(S): 50 CAPSULE, EXTENDED RELEASE ORAL at 21:05

## 2019-08-10 RX ADMIN — Medication 100 MILLIGRAM(S): at 16:02

## 2019-08-10 RX ADMIN — MORPHINE SULFATE 4 MILLIGRAM(S): 50 CAPSULE, EXTENDED RELEASE ORAL at 11:22

## 2019-08-10 RX ADMIN — MORPHINE SULFATE 2 MILLIGRAM(S): 50 CAPSULE, EXTENDED RELEASE ORAL at 23:30

## 2019-08-10 RX ADMIN — MORPHINE SULFATE 4 MILLIGRAM(S): 50 CAPSULE, EXTENDED RELEASE ORAL at 15:25

## 2019-08-10 RX ADMIN — CEFEPIME 100 MILLIGRAM(S): 1 INJECTION, POWDER, FOR SOLUTION INTRAMUSCULAR; INTRAVENOUS at 17:23

## 2019-08-10 RX ADMIN — MORPHINE SULFATE 2 MILLIGRAM(S): 50 CAPSULE, EXTENDED RELEASE ORAL at 22:57

## 2019-08-10 RX ADMIN — MORPHINE SULFATE 2 MILLIGRAM(S): 50 CAPSULE, EXTENDED RELEASE ORAL at 20:37

## 2019-08-10 RX ADMIN — ONDANSETRON 4 MILLIGRAM(S): 8 TABLET, FILM COATED ORAL at 22:19

## 2019-08-10 RX ADMIN — Medication 10 MILLIGRAM(S): at 22:38

## 2019-08-10 RX ADMIN — SODIUM CHLORIDE 2000 MILLILITER(S): 9 INJECTION, SOLUTION INTRAVENOUS at 11:22

## 2019-08-10 NOTE — ED ADULT NURSE NOTE - OBJECTIVE STATEMENT
pt came to the ER today with c/o abdominal pain, nausea and vomiting today. denies diarrhea. no blood in stool but feels constipated. pain is described sharp pain continuos.

## 2019-08-10 NOTE — CONSULT NOTE ADULT - ASSESSMENT
ASSESSMENT:  44y M PMH HTN s/p suspected stroke treated w/ tPA bolus 6/6/19, DM presenting s/p fall (sometime in past week) w grade 3 splenic laceration, hemoperitoneum, without active extravasation.    PLAN:     Neurologic: AO x 3  S/p suspected ischemic stroke 6/19  ·	No residual deficits, workup incomplete d/t pt AMA departure  ·	Pending workup:   ·	MRI head NC  ·	Echocardiogram w/ bubble study  ·	Hypercoagulable labs, HgbA1c, lipid profile  ·	Continue workup once out of acute phase  Pain control: morphine    Respiratory:   No acute diagnoses  Satting well on RA    Cardiovascular:   HTN  ·	Per chart review, patient on amlodipine 2.5mg QD, losartan 25mg QD  ·	IV labetalol q6h while NPO  HLD  ·	Home atorvastatin held while NPO  ·	  Gastrointestinal/Nutrition:  No acute diagnoses  ·	NPO, NS during this acute phase  ·	iv PTX   Renal/Genitourinary:   Hematologic:   Infectious Disease:   Lines/Tubes:  Endocrine:   Disposition:     --------------------------------------------------------------------------------------    Critical Care Diagnoses: ASSESSMENT:  44y M PMH HTN s/p suspected stroke treated w/ tPA bolus 6/6/19, DM presenting s/p fall (sometime in past week) w grade 3 splenic laceration, hemoperitoneum, without active extravasation.    PLAN:     Neurologic: AO x 3  S/p suspected ischemic stroke 6/19  ·	No residual deficits, workup incomplete d/t pt AMA departure  ·	Pending workup:   ·	MRI head NC  ·	Echocardiogram w/ bubble study  ·	Hypercoagulable labs, HgbA1c, lipid profile  ·	Continue workup once out of acute phase  Pain control: morphine    Respiratory:   Subacute nondisplaced L 10th fx  ·	Satting well on RA, encourage IS    Cardiovascular:   HTN  ·	Per chart review, patient on amlodipine 2.5mg QD, losartan 25mg QD  ·	IV labetalol q6h while NPO  HLD  ·	Home atorvastatin held while NPO    Gastrointestinal/Nutrition:  Grade 3 splenic laceration  ·	No active extrav on CTA, no intervention per IR  ·	Serial abdominal exams  ·	Trend lactates, CBC q6h  ·	4 pRBCs, 2 platelet on hold for possible OR  ·	NPO, NS during this acute phase  ·	GI ppx: PTX    Renal/Genitourinary:   CANDICE  ·	Baseline Cr 1, current Cr 1.5  ·	Continue IVF    Hematologic:   Hemoperitoneum on CT  ·	Serial CBCs: 9.2--8.3--next at 2330, 430a  ·	Trend hgb  ·	4 pRBCs, 2 plt on hold    Infectious Disease:   No acute diagnoses.    Endocrine:   DM  ·	On ISS  ·	Q4H FS    Lines/Tubes: 4 PIV    Disposition: SICU for close hemodynamic monitoring    --------------------------------------------------------------------------------------    Critical Care Diagnoses: Grade 3 splenic laceration

## 2019-08-10 NOTE — H&P ADULT - HISTORY OF PRESENT ILLNESS
44 y.o. male with history of hypertension and ischemic stroke 2 months ago, at which time he received tPa bolus and signed out AMA on hospital day #1, presents to ED today with 5 day history of worsening LLQ abdominal pain that is now diffuse.  He reports vomiting.  No fever, chills, diarrhea or constipation.

## 2019-08-10 NOTE — ED PROVIDER NOTE - CLINICAL SUMMARY MEDICAL DECISION MAKING FREE TEXT BOX
Pt with leukocytosis and splenic lac vs rupture vs abscess on CT, seen by surgery, to get CTA for possible IR, will give abx, continue fluids, admit SICU

## 2019-08-10 NOTE — H&P ADULT - ATTENDING COMMENTS
45yo male with recent history of stroke 2 months ago, received tPA, on ASA presenting with abdominal pain x 5 days, found to have grade 3 splenic injury. CTA negative for extravasation. Admit to SICU, serial H/H, transfuse PRN, monitor vital signs, serial abdominal exams. Pain control.

## 2019-08-10 NOTE — H&P ADULT - NSHPLABSRESULTS_GEN_ALL_CORE
9.2    19.83 )-----------( 244      ( 10 Aug 2019 11:45 )             30.8     10 Aug 2019 11:45    137    |  98     |  16     ----------------------------<  149    4.7     |  23     |  1.5      Ca    9.4        10 Aug 2019 11:45    TPro  7.9    /  Alb  4.2    /  TBili  1.3    /  DBili  x      /  AST  27     /  ALT  14     /  AlkPhos  80     10 Aug 2019 11:45    LIVER FUNCTIONS - ( 10 Aug 2019 11:45 )  Alb: 4.2 g/dL / Pro: 7.9 g/dL / ALK PHOS: 80 U/L / ALT: 14 U/L / AST: 27 U/L / GGT: x             CAPILLARY BLOOD GLUCOSE      RADIOLOGY  < from: CT Abdomen and Pelvis w/ IV Cont (08.10.19 @ 14:14) >  IMPRESSION: Grade 3 splenic injury with multiple large intraparenchymal hematomas. Hemoperitoneum with large hematoma in the left paracolic gutter. Subacute nondisplaced fracture of the left 10th rib.  3.5 cm right adrenal mass, likely an adenoma.    < from: CT Head No Cont (08.10.19 @ 13:45) >  IMPRESSION: No CT evidence of acute intracranial pathology.

## 2019-08-10 NOTE — ED PROVIDER NOTE - PHYSICAL EXAMINATION
CONSTITUTIONAL: Well-developed; well-nourished; in no acute distress.   SKIN: warm, dry  HEAD: Normocephalic; atraumatic.  EYES: no conjunctival injection. PERRL.   ENT: No nasal discharge; airway clear.  NECK: Supple; non tender.  CARD: S1, S2 normal; no murmurs, gallops, or rubs. Regular rate and rhythm.   RESP: No wheezes, rales or rhonchi.  ABD: soft, moderate diffuse ttp, worse in the LLQ.   EXT: Normal ROM.  No clubbing, cyanosis or edema.   LYMPH: No acute cervical adenopathy.  NEURO: Alert, oriented, grossly unremarkable  PSYCH: Cooperative, appropriate.

## 2019-08-10 NOTE — ED PROVIDER NOTE - ATTENDING CONTRIBUTION TO CARE
43yo man h/o 6/6/2019, s/p TPA, HTN presents with LLQ pain radiating into the RLQ associated with constipation, progressively worsening over the past 5 days. No fever, or chills; pt has been nauseated but just began vomiting this morning. On exam he is uncomfortable due to pain, but nontoxic appearing. ENOCH, poor dentition, lungs CTA, CVS1S2 RRR abd diffusely tender with guarding, will check labs, hydrate, pain control, abd/pelvis CT, reasssess.

## 2019-08-10 NOTE — CONSULT NOTE ADULT - ATTENDING COMMENTS
Assessment and plan above were discussed with me over the phone. I was not present for examination.  Grade 3 splenic lac without active extravasation with initial insult 5 days ago,stable vitals: trend hgb  if continues to drop or becomes unstable patient should undergo proximal splenic artery embolization.

## 2019-08-10 NOTE — H&P ADULT - ASSESSMENT
44 y.o. male with history of ischemic stroke 2 months ago s/p tPa now presenting with grade 3 splenic injury with multiple large intraparenchymal hematomas and hemoperitoneum    - ICU admission  - NPO  - IVF hydration  - repeat cbc stat and again at 8pm tonight  - F/u CTA and IR consult

## 2019-08-10 NOTE — CONSULT NOTE ADULT - SUBJECTIVE AND OBJECTIVE BOX
SICU Consultation Note  =====================================================  HPI: 44y Male  HPI:  44 y.o. male with history of hypertension, DM, and ischemic stroke 2 months ago, at which time he received tPa bolus and signed out AMA on hospital day #1, presents to ED today with 5 day history of worsening LLQ abdominal pain that is now diffuse.  He reports vomiting.  No fever, chills, diarrhea or constipation. Reports unintentional weight loss of ~30lb in the past couple of months. (10 Aug 2019 15:57)    SICU consulted d/t splenic laceration, findings of hemoperitoneum. History obtained from patient and family members at bedside. He reports having had abdominal pain for the past week, worse in the LLQ. Per family, he has had multiple episodes of witnessed falls this past week, the most recent this morning when he was in kitchen and fell forward onto the sink, then fell backwards and hit his head. Patient does not recall any episodes of falling during the week, nor does he remember hitting his head today. He reports worsening lethargy during the week, requiring the patient to stay in bed for 2 days. He reports reduction in appetite over the past week, with two episodes of emesis this morning described as an orange color. Patient reports no alleviating factors for his pain. Denies SOB, F, chills, chest pain.    Of note, patient does not know what medications he's on for HTN, DM.       PAST MEDICAL & SURGICAL HISTORY:  Stroke: 3 weeks ago  HTN (hypertension)    Home Meds: Home Medications:    Allergies: Allergies    No Known Allergies    Intolerances      Soc:   Advanced Directives: Presumed Full Code     ROS:    REVIEW OF SYSTEMS    [X] A ten-point review of systems was otherwise negative except as noted.  [ ] Due to altered mental status/intubation, subjective information were not able to be obtained from the patient. History was obtained, to the extent possible, from review of the chart and collateral sources of information.      CURRENT MEDICATIONS:   --------------------------------------------------------------------------------------  Neurologic Medications  morphine  - Injectable 2 milliGRAM(s) IV Push every 4 hours PRN Moderate Pain (4 - 6)    Respiratory Medications    Cardiovascular Medications    Gastrointestinal Medications  pantoprazole  Injectable 20 milliGRAM(s) IV Push daily  sodium chloride 0.9%. 1000 milliLiter(s) IV Continuous <Continuous>    Genitourinary Medications    Hematologic/Oncologic Medications    Antimicrobial/Immunologic Medications    Endocrine/Metabolic Medications    Topical/Other Medications  chlorhexidine 4% Liquid 1 Application(s) Topical daily    --------------------------------------------------------------------------------------    VITAL SIGNS, INS/OUTS (last 24 hours):  --------------------------------------------------------------------------------------  ICU Vital Signs Last 24 Hrs  T(C): 36.8 (10 Aug 2019 17:12), Max: 36.8 (10 Aug 2019 17:12)  T(F): 98.2 (10 Aug 2019 17:12), Max: 98.2 (10 Aug 2019 17:12)  HR: 69 (10 Aug 2019 17:12) (69 - 85)  BP: 154/98 (10 Aug 2019 17:12) (135/78 - 154/98)  BP(mean): --  ABP: --  ABP(mean): --  RR: 20 (10 Aug 2019 17:12) (18 - 20)  SpO2: 100% (10 Aug 2019 17:12) (98% - 100%)    I&O's Summary    --------------------------------------------------------------------------------------    EXAM:  General/Neuro  GCS: 15  Exam: Normal, NAD, alert, oriented x 3, no focal deficits. PERRLA     Respiratory  Exam: Lungs clear to auscultation, Normal expansion/effort.     Cardiovascular  Exam: S1, S2.  Regular rate and rhythm.    Cardiac Rhythm: Normal Sinus Rhythm      GI  Exam: Abdomen soft, peritonitic, worse in LLQ, Non-distended.    Current Diet:  NP, LR      Tubes/Lines/Drains  ***  [x] Peripheral IV            Extremities  Exam: Extremities warm, well-perfused.        Derm:  Exam: Good skin turgor, no skin breakdown.        LABS  --------------------------------------------------------------------------------------  Labs:  CAPILLARY BLOOD GLUCOSE                              8.3    13.85 )-----------( 199      ( 10 Aug 2019 16:57 )             27.6       Auto Neutrophil %: 83.2 % (08-10-19 @ 16:57)  Auto Immature Granulocyte %: 0.4 % (08-10-19 @ 16:57)  Auto Neutrophil %: 93.9 % (08-10-19 @ 11:45)    08-10    137  |  98  |  16  ----------------------------<  149<H>  4.7   |  23  |  1.5      Calcium, Total Serum: 9.4 mg/dL (08-10-19 @ 11:45)      LFTs:             7.9  | 1.3  | 27       ------------------[80      ( 10 Aug 2019 11:45 )  4.2  | x    | 14          Lipase:29     Amylase:x         Blood Gas Venous - Lactate: 2.4 mmoL/L (08-10-19 @ 17:28)      Coags:     15.10  ----< 1.32    ( 10 Aug 2019 16:10 )     20.8      --------------------------------------------------------------------------------------        IMAGING RESULTS  < from: CT Head No Cont (08.10.19 @ 13:45) >  IMPRESSION:    No CT evidence of acute intracranial pathology.    < end of copied text >    < from: CT Abdomen and Pelvis w/ IV Cont (08.10.19 @ 14:14) >  SPLEEN: Multiple areas of intraparenchymal hemorrhage within the spleen   measuring up to 8.6 cm. Associated hemoperitoneum as well as a large   approximately 17 x 4 x 5 cm hematoma within the left paracolic gutter.ADRENAL GLANDS: 3.5 cm right adrenal mass, most likely an adenoma.   Unremarkable left adrenal gland.BONES/SOFT TISSUES: No acute osseous abnormality. Subacute nondisplaced   fracture of the left 10th rib. Chronic deformity of the right ilium.      IMPRESSION:    1. Grade 3 splenic injury with multiple large intraparenchymal hematomas.   Hemoperitoneum with large hematoma in the left paracolic gutter.  2. Subacute nondisplaced fracture of the left 10th rib.  3. 3.5 cm right adrenal mass, likely anadenoma.    < end of copied text >    < from: CT Angio Abdomen and Pelvis w/ IV Cont (08.10.19 @ 16:09) >  SPLEEN: No evidence of active arterial extravasation. Previously   described splenic hematomas, associated hemoperitoneum, and left   intra-abdominal hematoma are not significantly changed.IMPRESSION:    No evidence of active arterial extravasation. Stable examination,   including previously described splenic hematomas, associated   hemoperitoneum, and left intra-abdominal hematoma.    < end of copied text > SICU Consultation Note  =====================================================  HPI: 44y Male  HPI:  44 y.o. male with history of hypertension, DM, and ischemic stroke 2 months ago, at which time he received tPa bolus and signed out AMA on hospital day #1, presents to ED today with 5 day history of worsening LLQ abdominal pain that is now diffuse.  He reports vomiting.  No fever, chills, diarrhea or constipation. Reports unintentional weight loss of ~30lb in the past couple of months. (10 Aug 2019 15:57)    SICU consulted d/t splenic laceration, findings of hemoperitoneum. History obtained from patient and family members at bedside. He reports having had abdominal pain for the past week, worse in the LLQ. Per family, he has had multiple episodes of witnessed falls this past week, the most recent this morning when he was in kitchen and fell forward onto the sink, then fell backwards and hit his head. Patient does not recall any episodes of falling during the week, nor does he remember hitting his head today. He reports worsening lethargy during the week, requiring the patient to stay in bed for 2 days. He reports reduction in appetite over the past week, with two episodes of emesis this morning described as an orange color. Patient reports no alleviating factors for his pain. Denies SOB, F, chills, chest pain.    Of note, patient does not know what medications he's on for HTN, DM.       PAST MEDICAL & SURGICAL HISTORY:  Stroke: 3 weeks ago  HTN (hypertension)    Home Meds: Home Medications:    Allergies: Allergies    No Known Allergies    Intolerances      Soc:   Advanced Directives: Presumed Full Code     ROS:    REVIEW OF SYSTEMS    [X] A ten-point review of systems was otherwise negative except as noted.  [ ] Due to altered mental status/intubation, subjective information were not able to be obtained from the patient. History was obtained, to the extent possible, from review of the chart and collateral sources of information.      CURRENT MEDICATIONS:   --------------------------------------------------------------------------------------  Neurologic Medications  morphine  - Injectable 2 milliGRAM(s) IV Push every 4 hours PRN Moderate Pain (4 - 6)    Respiratory Medications    Cardiovascular Medications    Gastrointestinal Medications  pantoprazole  Injectable 20 milliGRAM(s) IV Push daily  sodium chloride 0.9%. 1000 milliLiter(s) IV Continuous <Continuous>    Genitourinary Medications    Hematologic/Oncologic Medications    Antimicrobial/Immunologic Medications    Endocrine/Metabolic Medications    Topical/Other Medications  chlorhexidine 4% Liquid 1 Application(s) Topical daily    --------------------------------------------------------------------------------------    VITAL SIGNS, INS/OUTS (last 24 hours):  --------------------------------------------------------------------------------------  ICU Vital Signs Last 24 Hrs  T(C): 36.8 (10 Aug 2019 17:12), Max: 36.8 (10 Aug 2019 17:12)  T(F): 98.2 (10 Aug 2019 17:12), Max: 98.2 (10 Aug 2019 17:12)  HR: 69 (10 Aug 2019 17:12) (69 - 85)  BP: 154/98 (10 Aug 2019 17:12) (135/78 - 154/98)  BP(mean): --  ABP: --  ABP(mean): --  RR: 20 (10 Aug 2019 17:12) (18 - 20)  SpO2: 100% (10 Aug 2019 17:12) (98% - 100%)    I&O's Summary    --------------------------------------------------------------------------------------    EXAM:  General/Neuro  GCS: 15  Exam: Normal, NAD, alert, oriented x 3, no focal deficits. PERRLA, mildly icteric, patient reports as baseline. Normocephalic, no signs of trauma.    Respiratory  Exam: Lungs clear to auscultation, Normal expansion/effort.     Cardiovascular  Exam: S1, S2.  Regular rate and rhythm.    Cardiac Rhythm: Normal Sinus Rhythm      GI  Exam: Abdomen soft, peritonitic, worse in LLQ, Non-distended.    Current Diet:  NP, LR      Tubes/Lines/Drains  ***  [x] Peripheral IV            Extremities  Exam: Extremities warm, well-perfused.        Derm:  Exam: Good skin turgor, no skin breakdown.        LABS  --------------------------------------------------------------------------------------  Labs:  CAPILLARY BLOOD GLUCOSE                              8.3    13.85 )-----------( 199      ( 10 Aug 2019 16:57 )             27.6       Auto Neutrophil %: 83.2 % (08-10-19 @ 16:57)  Auto Immature Granulocyte %: 0.4 % (08-10-19 @ 16:57)  Auto Neutrophil %: 93.9 % (08-10-19 @ 11:45)    08-10    137  |  98  |  16  ----------------------------<  149<H>  4.7   |  23  |  1.5      Calcium, Total Serum: 9.4 mg/dL (08-10-19 @ 11:45)      LFTs:             7.9  | 1.3  | 27       ------------------[80      ( 10 Aug 2019 11:45 )  4.2  | x    | 14          Lipase:29     Amylase:x         Blood Gas Venous - Lactate: 2.4 mmoL/L (08-10-19 @ 17:28)      Coags:     15.10  ----< 1.32    ( 10 Aug 2019 16:10 )     20.8      --------------------------------------------------------------------------------------        IMAGING RESULTS  < from: CT Head No Cont (08.10.19 @ 13:45) >  IMPRESSION:    No CT evidence of acute intracranial pathology.    < end of copied text >    < from: CT Abdomen and Pelvis w/ IV Cont (08.10.19 @ 14:14) >  SPLEEN: Multiple areas of intraparenchymal hemorrhage within the spleen   measuring up to 8.6 cm. Associated hemoperitoneum as well as a large   approximately 17 x 4 x 5 cm hematoma within the left paracolic gutter.ADRENAL GLANDS: 3.5 cm right adrenal mass, most likely an adenoma.   Unremarkable left adrenal gland.BONES/SOFT TISSUES: No acute osseous abnormality. Subacute nondisplaced   fracture of the left 10th rib. Chronic deformity of the right ilium.      IMPRESSION:    1. Grade 3 splenic injury with multiple large intraparenchymal hematomas.   Hemoperitoneum with large hematoma in the left paracolic gutter.  2. Subacute nondisplaced fracture of the left 10th rib.  3. 3.5 cm right adrenal mass, likely anadenoma.    < end of copied text >    < from: CT Angio Abdomen and Pelvis w/ IV Cont (08.10.19 @ 16:09) >  SPLEEN: No evidence of active arterial extravasation. Previously   described splenic hematomas, associated hemoperitoneum, and left   intra-abdominal hematoma are not significantly changed.IMPRESSION:    No evidence of active arterial extravasation. Stable examination,   including previously described splenic hematomas, associated   hemoperitoneum, and left intra-abdominal hematoma.    < end of copied text >

## 2019-08-10 NOTE — ED PROVIDER NOTE - NS ED ROS FT
Review of Systems:  •	CONSTITUTIONAL - No fever, No diaphoresis, No weight change  •	SKIN - No rash  •	HEMATOLOGIC - No abnormal bleeding or bruising  •	EYES - No eye pain, No blurred vision  •	ENT - No change in hearing, No sore throat, No neck pain, No rhinorrhea, No ear pain  •	RESPIRATORY - No shortness of breath, No cough  •	CARDIAC -No chest pain, No palpitations  •	GI - as per hpi  •                 - No dysuria, frequency, hematuria.   •	ENDO - No polydypsia, No polyuria, No heat/cold intolerance  •	MUSCULOSKELETAL - No joint paint, No swelling, No back pain  •	NEUROLOGIC - + face numbness, No focal weakness, No headache, No dizziness  All other systems negative, unless specified in HPI

## 2019-08-10 NOTE — CHART NOTE - NSCHARTNOTEFT_GEN_A_CORE
Patient examined at bedside with ICU team. Abdominal exam improved from admission, peritonitis improved. Continues to have LLQ tenderness. Patient reports pain has improved but still present.    PLAN:  -serial abdominal exams  -trend lactate, hgb

## 2019-08-10 NOTE — ED PROVIDER NOTE - PROGRESS NOTE DETAILS
Discussed with surgery, will come see patient. CT with splenic lac vs rupture/abscesses, seen by surgery Bryan Barney and Padmini, upgrading to crit with IVF, abx, type and screen, CTA for possible IR.

## 2019-08-10 NOTE — ED PROVIDER NOTE - OBJECTIVE STATEMENT
43 y/o male with PM of CVA 6/6/19 with residual numbness to the left side of the face who presents to ED for abdominal pain. PT c/op 5 day history of constant sharp pain in the LLQ, getting gradually worse, worse with movement, no alleviating factors. Today pt had 2 episodes of NBNB vomiting. Pt is unsure of his last bowel movement. No urinary complaints. No testicle pain. No history of abdominal surgery.

## 2019-08-11 LAB
ANION GAP SERPL CALC-SCNC: 10 MMOL/L — SIGNIFICANT CHANGE UP (ref 7–14)
APTT BLD: 29.5 SEC — SIGNIFICANT CHANGE UP (ref 27–39.2)
BASOPHILS # BLD AUTO: 0.02 K/UL — SIGNIFICANT CHANGE UP (ref 0–0.2)
BASOPHILS NFR BLD AUTO: 0.2 % — SIGNIFICANT CHANGE UP (ref 0–1)
BUN SERPL-MCNC: 20 MG/DL — SIGNIFICANT CHANGE UP (ref 10–20)
CALCIUM SERPL-MCNC: 9 MG/DL — SIGNIFICANT CHANGE UP (ref 8.5–10.1)
CHLORIDE SERPL-SCNC: 100 MMOL/L — SIGNIFICANT CHANGE UP (ref 98–110)
CK MB CFR SERPL CALC: <1 NG/ML — SIGNIFICANT CHANGE UP (ref 0.6–6.3)
CK MB CFR SERPL CALC: <1 NG/ML — SIGNIFICANT CHANGE UP (ref 0.6–6.3)
CK SERPL-CCNC: 44 U/L — SIGNIFICANT CHANGE UP (ref 0–225)
CK SERPL-CCNC: 51 U/L — SIGNIFICANT CHANGE UP (ref 0–225)
CO2 SERPL-SCNC: 25 MMOL/L — SIGNIFICANT CHANGE UP (ref 17–32)
CREAT SERPL-MCNC: 1.2 MG/DL — SIGNIFICANT CHANGE UP (ref 0.7–1.5)
EOSINOPHIL # BLD AUTO: 0 K/UL — SIGNIFICANT CHANGE UP (ref 0–0.7)
EOSINOPHIL NFR BLD AUTO: 0 % — SIGNIFICANT CHANGE UP (ref 0–8)
GLUCOSE BLDC GLUCOMTR-MCNC: 110 MG/DL — HIGH (ref 70–99)
GLUCOSE BLDC GLUCOMTR-MCNC: 114 MG/DL — HIGH (ref 70–99)
GLUCOSE BLDC GLUCOMTR-MCNC: 68 MG/DL — LOW (ref 70–99)
GLUCOSE BLDC GLUCOMTR-MCNC: 79 MG/DL — SIGNIFICANT CHANGE UP (ref 70–99)
GLUCOSE BLDC GLUCOMTR-MCNC: 92 MG/DL — SIGNIFICANT CHANGE UP (ref 70–99)
GLUCOSE SERPL-MCNC: 109 MG/DL — HIGH (ref 70–99)
HCT VFR BLD CALC: 24.1 % — LOW (ref 42–52)
HCT VFR BLD CALC: 24.4 % — LOW (ref 42–52)
HCT VFR BLD CALC: 27.5 % — LOW (ref 42–52)
HCT VFR BLD CALC: 28.2 % — LOW (ref 42–52)
HGB BLD-MCNC: 7.3 G/DL — LOW (ref 14–18)
HGB BLD-MCNC: 7.5 G/DL — LOW (ref 14–18)
HGB BLD-MCNC: 8.4 G/DL — LOW (ref 14–18)
HGB BLD-MCNC: 8.4 G/DL — LOW (ref 14–18)
IMM GRANULOCYTES NFR BLD AUTO: 0.6 % — HIGH (ref 0.1–0.3)
INR BLD: 1.43 RATIO — HIGH (ref 0.65–1.3)
LACTATE SERPL-SCNC: 1.1 MMOL/L — SIGNIFICANT CHANGE UP (ref 0.5–2.2)
LACTATE SERPL-SCNC: 1.3 MMOL/L — SIGNIFICANT CHANGE UP (ref 0.5–2.2)
LYMPHOCYTES # BLD AUTO: 0.94 K/UL — LOW (ref 1.2–3.4)
LYMPHOCYTES # BLD AUTO: 8.8 % — LOW (ref 20.5–51.1)
MAGNESIUM SERPL-MCNC: 1.8 MG/DL — SIGNIFICANT CHANGE UP (ref 1.8–2.4)
MCHC RBC-ENTMCNC: 18.8 PG — LOW (ref 27–31)
MCHC RBC-ENTMCNC: 19.8 PG — LOW (ref 27–31)
MCHC RBC-ENTMCNC: 19.9 PG — LOW (ref 27–31)
MCHC RBC-ENTMCNC: 20 PG — LOW (ref 27–31)
MCHC RBC-ENTMCNC: 29.8 G/DL — LOW (ref 32–37)
MCHC RBC-ENTMCNC: 30.3 G/DL — LOW (ref 32–37)
MCHC RBC-ENTMCNC: 30.5 G/DL — LOW (ref 32–37)
MCHC RBC-ENTMCNC: 30.7 G/DL — LOW (ref 32–37)
MCV RBC AUTO: 62.1 FL — LOW (ref 80–94)
MCV RBC AUTO: 64.9 FL — LOW (ref 80–94)
MCV RBC AUTO: 65.3 FL — LOW (ref 80–94)
MCV RBC AUTO: 66.4 FL — LOW (ref 80–94)
MONOCYTES # BLD AUTO: 1.32 K/UL — HIGH (ref 0.1–0.6)
MONOCYTES NFR BLD AUTO: 12.3 % — HIGH (ref 1.7–9.3)
NEUTROPHILS # BLD AUTO: 8.39 K/UL — HIGH (ref 1.4–6.5)
NEUTROPHILS NFR BLD AUTO: 78.1 % — HIGH (ref 42.2–75.2)
NRBC # BLD: 0 /100 WBCS — SIGNIFICANT CHANGE UP (ref 0–0)
PHOSPHATE SERPL-MCNC: 3.3 MG/DL — SIGNIFICANT CHANGE UP (ref 2.1–4.9)
PLATELET # BLD AUTO: 136 K/UL — SIGNIFICANT CHANGE UP (ref 130–400)
PLATELET # BLD AUTO: 140 K/UL — SIGNIFICANT CHANGE UP (ref 130–400)
PLATELET # BLD AUTO: 147 K/UL — SIGNIFICANT CHANGE UP (ref 130–400)
PLATELET # BLD AUTO: 177 K/UL — SIGNIFICANT CHANGE UP (ref 130–400)
POTASSIUM SERPL-MCNC: 4.1 MMOL/L — SIGNIFICANT CHANGE UP (ref 3.5–5)
POTASSIUM SERPL-SCNC: 4.1 MMOL/L — SIGNIFICANT CHANGE UP (ref 3.5–5)
PROTHROM AB SERPL-ACNC: 16.4 SEC — HIGH (ref 9.95–12.87)
RBC # BLD: 3.76 M/UL — LOW (ref 4.7–6.1)
RBC # BLD: 3.88 M/UL — LOW (ref 4.7–6.1)
RBC # BLD: 4.21 M/UL — LOW (ref 4.7–6.1)
RBC # BLD: 4.25 M/UL — LOW (ref 4.7–6.1)
RBC # FLD: 17 % — HIGH (ref 11.5–14.5)
RBC # FLD: 19.3 % — HIGH (ref 11.5–14.5)
RBC # FLD: 19.8 % — HIGH (ref 11.5–14.5)
RBC # FLD: 20.2 % — HIGH (ref 11.5–14.5)
SODIUM SERPL-SCNC: 135 MMOL/L — SIGNIFICANT CHANGE UP (ref 135–146)
TROPONIN T SERPL-MCNC: <0.01 NG/ML — SIGNIFICANT CHANGE UP
TROPONIN T SERPL-MCNC: <0.01 NG/ML — SIGNIFICANT CHANGE UP
WBC # BLD: 10.73 K/UL — SIGNIFICANT CHANGE UP (ref 4.8–10.8)
WBC # BLD: 11.74 K/UL — HIGH (ref 4.8–10.8)
WBC # BLD: 13.98 K/UL — HIGH (ref 4.8–10.8)
WBC # BLD: 9 K/UL — SIGNIFICANT CHANGE UP (ref 4.8–10.8)
WBC # FLD AUTO: 10.73 K/UL — SIGNIFICANT CHANGE UP (ref 4.8–10.8)
WBC # FLD AUTO: 11.74 K/UL — HIGH (ref 4.8–10.8)
WBC # FLD AUTO: 13.98 K/UL — HIGH (ref 4.8–10.8)
WBC # FLD AUTO: 9 K/UL — SIGNIFICANT CHANGE UP (ref 4.8–10.8)

## 2019-08-11 PROCEDURE — 93880 EXTRACRANIAL BILAT STUDY: CPT | Mod: 26

## 2019-08-11 PROCEDURE — 99233 SBSQ HOSP IP/OBS HIGH 50: CPT

## 2019-08-11 PROCEDURE — 93306 TTE W/DOPPLER COMPLETE: CPT | Mod: 26

## 2019-08-11 PROCEDURE — 71045 X-RAY EXAM CHEST 1 VIEW: CPT | Mod: 26

## 2019-08-11 PROCEDURE — 95819 EEG AWAKE AND ASLEEP: CPT | Mod: 26

## 2019-08-11 RX ORDER — OXYCODONE HYDROCHLORIDE 5 MG/1
5 TABLET ORAL EVERY 6 HOURS
Refills: 0 | Status: DISCONTINUED | OUTPATIENT
Start: 2019-08-11 | End: 2019-08-15

## 2019-08-11 RX ORDER — CLEVIDIPINE BUTYRATE 50MG/100ML
1 VIAL (ML) INTRAVENOUS
Qty: 25 | Refills: 0 | Status: DISCONTINUED | OUTPATIENT
Start: 2019-08-11 | End: 2019-08-11

## 2019-08-11 RX ORDER — MORPHINE SULFATE 50 MG/1
4 CAPSULE, EXTENDED RELEASE ORAL EVERY 4 HOURS
Refills: 0 | Status: DISCONTINUED | OUTPATIENT
Start: 2019-08-11 | End: 2019-08-11

## 2019-08-11 RX ORDER — ATORVASTATIN CALCIUM 80 MG/1
80 TABLET, FILM COATED ORAL AT BEDTIME
Refills: 0 | Status: DISCONTINUED | OUTPATIENT
Start: 2019-08-11 | End: 2019-08-15

## 2019-08-11 RX ORDER — AMLODIPINE BESYLATE 2.5 MG/1
2.5 TABLET ORAL ONCE
Refills: 0 | Status: COMPLETED | OUTPATIENT
Start: 2019-08-11 | End: 2019-08-11

## 2019-08-11 RX ORDER — ACETAMINOPHEN 500 MG
650 TABLET ORAL EVERY 6 HOURS
Refills: 0 | Status: COMPLETED | OUTPATIENT
Start: 2019-08-11 | End: 2019-08-14

## 2019-08-11 RX ORDER — MAGNESIUM SULFATE 500 MG/ML
2 VIAL (ML) INJECTION ONCE
Refills: 0 | Status: COMPLETED | OUTPATIENT
Start: 2019-08-11 | End: 2019-08-11

## 2019-08-11 RX ORDER — HYDROMORPHONE HYDROCHLORIDE 2 MG/ML
0.5 INJECTION INTRAMUSCULAR; INTRAVENOUS; SUBCUTANEOUS ONCE
Refills: 0 | Status: DISCONTINUED | OUTPATIENT
Start: 2019-08-11 | End: 2019-08-11

## 2019-08-11 RX ORDER — OXYCODONE HYDROCHLORIDE 5 MG/1
10 TABLET ORAL EVERY 6 HOURS
Refills: 0 | Status: DISCONTINUED | OUTPATIENT
Start: 2019-08-11 | End: 2019-08-15

## 2019-08-11 RX ORDER — PANTOPRAZOLE SODIUM 20 MG/1
40 TABLET, DELAYED RELEASE ORAL
Refills: 0 | Status: DISCONTINUED | OUTPATIENT
Start: 2019-08-11 | End: 2019-08-15

## 2019-08-11 RX ORDER — HEPARIN SODIUM 5000 [USP'U]/ML
5000 INJECTION INTRAVENOUS; SUBCUTANEOUS EVERY 8 HOURS
Refills: 0 | Status: DISCONTINUED | OUTPATIENT
Start: 2019-08-11 | End: 2019-08-12

## 2019-08-11 RX ADMIN — ATORVASTATIN CALCIUM 80 MILLIGRAM(S): 80 TABLET, FILM COATED ORAL at 22:27

## 2019-08-11 RX ADMIN — HEPARIN SODIUM 5000 UNIT(S): 5000 INJECTION INTRAVENOUS; SUBCUTANEOUS at 15:18

## 2019-08-11 RX ADMIN — OXYCODONE HYDROCHLORIDE 5 MILLIGRAM(S): 5 TABLET ORAL at 11:20

## 2019-08-11 RX ADMIN — MORPHINE SULFATE 4 MILLIGRAM(S): 50 CAPSULE, EXTENDED RELEASE ORAL at 02:00

## 2019-08-11 RX ADMIN — Medication 650 MILLIGRAM(S): at 23:37

## 2019-08-11 RX ADMIN — HYDROMORPHONE HYDROCHLORIDE 0.5 MILLIGRAM(S): 2 INJECTION INTRAMUSCULAR; INTRAVENOUS; SUBCUTANEOUS at 03:00

## 2019-08-11 RX ADMIN — OXYCODONE HYDROCHLORIDE 5 MILLIGRAM(S): 5 TABLET ORAL at 12:40

## 2019-08-11 RX ADMIN — PANTOPRAZOLE SODIUM 40 MILLIGRAM(S): 20 TABLET, DELAYED RELEASE ORAL at 15:18

## 2019-08-11 RX ADMIN — HEPARIN SODIUM 5000 UNIT(S): 5000 INJECTION INTRAVENOUS; SUBCUTANEOUS at 22:27

## 2019-08-11 RX ADMIN — OXYCODONE HYDROCHLORIDE 5 MILLIGRAM(S): 5 TABLET ORAL at 18:10

## 2019-08-11 RX ADMIN — ONDANSETRON 4 MILLIGRAM(S): 8 TABLET, FILM COATED ORAL at 05:30

## 2019-08-11 RX ADMIN — AMLODIPINE BESYLATE 2.5 MILLIGRAM(S): 2.5 TABLET ORAL at 09:39

## 2019-08-11 RX ADMIN — ONDANSETRON 4 MILLIGRAM(S): 8 TABLET, FILM COATED ORAL at 01:21

## 2019-08-11 RX ADMIN — Medication 650 MILLIGRAM(S): at 11:18

## 2019-08-11 RX ADMIN — OXYCODONE HYDROCHLORIDE 5 MILLIGRAM(S): 5 TABLET ORAL at 18:40

## 2019-08-11 RX ADMIN — HYDROMORPHONE HYDROCHLORIDE 0.5 MILLIGRAM(S): 2 INJECTION INTRAMUSCULAR; INTRAVENOUS; SUBCUTANEOUS at 02:25

## 2019-08-11 RX ADMIN — MORPHINE SULFATE 2 MILLIGRAM(S): 50 CAPSULE, EXTENDED RELEASE ORAL at 01:00

## 2019-08-11 RX ADMIN — OXYCODONE HYDROCHLORIDE 10 MILLIGRAM(S): 5 TABLET ORAL at 22:27

## 2019-08-11 RX ADMIN — OXYCODONE HYDROCHLORIDE 10 MILLIGRAM(S): 5 TABLET ORAL at 22:57

## 2019-08-11 RX ADMIN — Medication 650 MILLIGRAM(S): at 18:10

## 2019-08-11 RX ADMIN — MORPHINE SULFATE 2 MILLIGRAM(S): 50 CAPSULE, EXTENDED RELEASE ORAL at 00:34

## 2019-08-11 RX ADMIN — Medication 650 MILLIGRAM(S): at 23:07

## 2019-08-11 RX ADMIN — Medication 650 MILLIGRAM(S): at 12:40

## 2019-08-11 RX ADMIN — Medication 25 GRAM(S): at 01:57

## 2019-08-11 RX ADMIN — MORPHINE SULFATE 4 MILLIGRAM(S): 50 CAPSULE, EXTENDED RELEASE ORAL at 01:29

## 2019-08-11 RX ADMIN — SODIUM CHLORIDE 100 MILLILITER(S): 9 INJECTION INTRAMUSCULAR; INTRAVENOUS; SUBCUTANEOUS at 05:29

## 2019-08-11 NOTE — PROGRESS NOTE ADULT - ASSESSMENT
44y M PMH HTN s/p suspected stroke treated w/ tPA bolus 6/6/19, DM presenting s/p fall (sometime in past week) w grade 3 splenic laceration, hemoperitoneum, without active extravasation.    PLAN:     Neurologic: AO x 3  S/p suspected ischemic stroke 6/19  No residual deficits, workup incomplete d/t pt AMA departure  Pending workup:   MRI head NC  Echocardiogram w/ bubble study--echo ordered 8/10  Hypercoagulable labs, HgbA1c, lipid profile  Continue workup once out of acute phase  Pain control: morphine    Respiratory:   Subacute nondisplaced L 10th fx  Satting well on RA, encourage IS    Cardiovascular:   HTN  Home meds: amlodipine 2.5mg QD, losartan 25mg QD, held while NPO  IV labetalol q6h while NPO    HLD  Home atorvastatin held while NPO    Gastrointestinal/Nutrition:  Grade 3 splenic laceration  No active extrav on CTA, no intervention per IR  Serial abdominal exams  Trend lactates, CBC q6h  4 pRBCs, 2 platelet on hold for possible OR  NPO, NS during this acute phase  GI ppx: PTX    Renal/Genitourinary:   CANDICE  Cr downtrending, now 1.2 from 1.5  Continue IVF    Hematologic:   Hemoperitoneum on CT  Serial CBCs: 9.2--8.3--next at 2330, 430a  Trend hgb  4 pRBCs, 2 plt on hold    Infectious Disease:   No acute diagnoses.    Endocrine:   DM  On ISS  Q6H FS    Lines/Tubes: 4 PIV    Disposition: SICU for close hemodynamic monitoring    --------------------------------------------------------------------------------------    Critical Care Diagnoses: Grade 3 splenic laceration 44y M PMH HTN s/p suspected stroke treated w/ tPA bolus 6/6/19, DM presenting s/p fall (sometime in past week) w grade 3 splenic laceration, hemoperitoneum, without active extravasation.    PLAN:     Neurologic: AO x 3  S/p suspected ischemic stroke 6/19  No residual deficits, workup incomplete d/t pt AMA departure  Pending workup:   MRI head NC  Echocardiogram w/ bubble study--echo ordered 8/10  Hypercoagulable labs, HgbA1c, lipid profile  Continue workup once out of acute phase  Pain control: morphine 2mg moderate, 4mg severe    Respiratory:   Subacute nondisplaced L 10th fx  Satting well on RA, encourage IS    Cardiovascular:   HTN  Home meds: amlodipine 2.5mg QD, losartan 25mg QD, held while NPO  Started on cleviprex gtt, SBP goal 140-160    HLD  Home atorvastatin held while NPO    Gastrointestinal/Nutrition:  Grade 3 splenic laceration  No active extrav on CTA, no intervention per IR  Serial abdominal exams  Trend lactates, CBC q6h  4 pRBCs, 2 platelet on hold for possible OR  NPO, NS during this acute phase  GI ppx: PTX    Renal/Genitourinary:   CANDICE  Cr downtrending, now 1.2 from 1.5  Continue IVF    Hematologic:   Hemoperitoneum on CT  Serial CBCs: 9.2--8.3--next at 2330, 430a  Trend hgb  4 pRBCs, 2 plt on hold    Infectious Disease:   No acute diagnoses.    Endocrine:   DM  On ISS  Q6H FS    Lines/Tubes: 4 PIV    Disposition: SICU for close hemodynamic monitoring    --------------------------------------------------------------------------------------    Critical Care Diagnoses: Grade 3 splenic laceration 44y M PMH HTN s/p suspected stroke treated w/ tPA bolus 6/6/19, DM presenting s/p fall (sometime in past week) w grade 3 splenic laceration, hemoperitoneum, without active extravasation.    PLAN:     Neurologic: AO x 3  S/p suspected ischemic stroke 6/19  No residual deficits, workup incomplete d/t pt AMA departure  Pending workup:   MRI head NC  Echocardiogram w/ bubble study--echo ordered 8/10  Hypercoagulable labs, HgbA1c, lipid profile  Continue workup once out of acute phase  Pain control: morphine 2mg moderate, 4mg severe    Respiratory:   Subacute nondisplaced L 10th fx  Satting well on RA, encourage IS    Cardiovascular:   HTN  Home meds: amlodipine 2.5mg QD, losartan 25mg QD, held while NPO  Started on cleviprex gtt, SBP goal 140-160    HLD  Home atorvastatin held while NPO    Gastrointestinal/Nutrition:  Grade 3 splenic laceration  No active extrav on CTA, no intervention per IR  Serial abdominal exams  Trend lactates, CBC q6h  4 pRBCs, 2 platelet on hold for possible OR  NPO, NS during this acute phase  GI ppx: PTX    Renal/Genitourinary:   CANDICE  Cr downtrending, now 1.2 from 1.5  Continue IVF    Hematologic:   Hemoperitoneum on CT  Serial CBCs: 9.2--8.3--next at 2330, 430a  Trend hgb, s/p 1 upRBC  4 pRBCs, 2 plt on hold    Infectious Disease:   No acute diagnoses.    Endocrine:   DM  On ISS  Q6H FS    Lines/Tubes: 4 PIV    Disposition: SICU for close hemodynamic monitoring    --------------------------------------------------------------------------------------    Critical Care Diagnoses: Grade 3 splenic laceration 44y M PMH HTN s/p suspected stroke treated w/ tPA bolus 6/6/19, DM presenting s/p fall (sometime in past week) w grade 3 splenic laceration, hemoperitoneum, without active extravasation.    PLAN:     Neurologic: AO x 3  S/p suspected ischemic stroke 6/19  No residual deficits, workup incomplete d/t pt AMA departure  Pending workup:   MRI head NC  Echocardiogram w/ bubble study--echo ordered 8/10  Hypercoagulable labs, HgbA1c, lipid profile  Continue workup once out of acute phase  Pain control: morphine 2mg moderate, 4mg severe    Respiratory:   Subacute nondisplaced L 10th fx  Satting well on RA, encourage IS    Cardiovascular:   HTN  Home meds: amlodipine 2.5mg QD, losartan 25mg QD, held while NPO  Started on cleviprex gtt, SBP goal 140-160, weaned off in AM    HLD  Home atorvastatin held while NPO    Gastrointestinal/Nutrition:  Grade 3 splenic laceration  No active extrav on CTA, no intervention per IR  Serial abdominal exams  Trend lactates, CBC q6h  4 pRBCs, 2 platelet on hold for possible OR  NPO, NS during this acute phase  GI ppx: PTX    Renal/Genitourinary:   CANDICE  Cr downtrending, now 1.2 from 1.5  Continue IVF    Hematologic:   Hemoperitoneum on CT  Serial CBCs: 9.2--8.3--next at 2330, 430a  Trend hgb, s/p 1 upRBC  4 pRBCs, 2 plt on hold    Infectious Disease:   No acute diagnoses.    Endocrine:   DM  On ISS  Q6H FS    Lines/Tubes: 4 PIV    Disposition: SICU for close hemodynamic monitoring    --------------------------------------------------------------------------------------    Critical Care Diagnoses: Grade 3 splenic laceration

## 2019-08-11 NOTE — PROGRESS NOTE ADULT - SUBJECTIVE AND OBJECTIVE BOX
SUNIL VILLALOBOS  [id number]  44y Male    Indication for ICU admission:    Admit Date:08-10-19  ICU Date: 8/10/19  OR Date: N/A    No Known Allergies    PAST MEDICAL & SURGICAL HISTORY:  Stroke: 3 weeks ago  HTN (hypertension)    Home Medications:  amlodipine 2.5mg QD  losartan 25 mg QD  ASA 81mg QD  lipitor 80mg QD      24HRS EVENT: Overnight, patient required 2 pushes of 10 IV labetalol for pressures to 150s/100s, with response to 160s/70s. HR remained in 70s throughout. Abdominal exam remained unchanged. CANDICE improved, 1.2 from 1.5.    Neurologic: AO x 3  S/p suspected ischemic stroke 6/19  No residual deficits, workup incomplete d/t pt AMA departure  Pain control: morphine    Respiratory:   Subacute nondisplaced L 10th fx  Satting well on RA, encourage IS    Cardiovascular:  Hypertensive overnight, requiring IV labetalol. Of note, patient had received home BP med prior to arrival in ED  HTN  IV labetalol  while NPO  HLD  Home atorvastatin held while NPO    Gastrointestinal/Nutrition:  Grade 3 splenic laceration  No active extrav on CTA, no intervention per IR  Serial abdominal exams  Trend lactates, CBC q6h  4 pRBCs, 2 platelet on hold for possible OR  NPO, NS during this acute phase  GI ppx: PTX    Renal/Genitourinary:   CANDICE  Baseline Cr 1, current Cr 1.2 from1.5  Continue IVF    Hematologic:   Hemoperitoneum on CT  Serial CBCs: 9.2--8.3--next at 2330, 430a  Trend hgb  4 pRBCs, 2 plt on hold    Infectious Disease:   No acute diagnoses.    Endocrine:   DM  On ISS  Q4H FS    Lines/Tubes: 4 PIV        DVT PPX: Held    GI PPX: PTX    ***Tubes/Lines/Drains  ***  Peripheral IV        REVIEW OF SYSTEMS    [ X] A ten-point review of systems was otherwise negative except as noted.  [ ] Due to altered mental status/intubation, subjective information were not able to be obtained from the patient. History was obtained, to the extent possible, from review of the chart and collateral sources of information. SUNIL VILLALOBOS  [id number]  44y Male    Indication for ICU admission:    Admit Date:08-10-19  ICU Date: 8/10/19  OR Date: N/A    No Known Allergies    PAST MEDICAL & SURGICAL HISTORY:  Stroke: 3 weeks ago  HTN (hypertension)    Home Medications:  amlodipine 2.5mg QD  losartan 25 mg QD  ASA 81mg QD  lipitor 80mg QD      24HRS EVENT: Overnight, patient required 2 pushes of 10 IV labetalol for pressures to 150s/100s, with response to 160s/70s. HR remained in 70s throughout. Started on cleviprex gtt for persistently elevated BP. Abdominal exam remained unchanged. CANDICE improved, 1.2 from 1.5.    Neurologic: AO x 3  S/p suspected ischemic stroke 6/19  No residual deficits, workup incomplete d/t pt AMA departure  Pain control: morphine    Respiratory:   Subacute nondisplaced L 10th fx  Satting well on RA, encourage IS    Cardiovascular:  Hypertensive overnight, requiring IV labetalol. Of note, patient had received home BP med prior to arrival in ED  HTN  IV labetalol  while NPO  HLD  Home atorvastatin held while NPO    Gastrointestinal/Nutrition:  Grade 3 splenic laceration  No active extrav on CTA, no intervention per IR  Serial abdominal exams  Trend lactates, CBC q6h  4 pRBCs, 2 platelet on hold for possible OR  NPO, NS during this acute phase  GI ppx: PTX    Renal/Genitourinary:   CANDICE  Baseline Cr 1, current Cr 1.2 from1.5  Continue IVF    Hematologic:   Hemoperitoneum on CT  Serial CBCs: 9.2--8.3--next at 2330, 430a  Trend hgb  4 pRBCs, 2 plt on hold    Infectious Disease:   No acute diagnoses.    Endocrine:   DM  On ISS  Q4H FS    Lines/Tubes: 4 PIV        DVT PPX: Held    GI PPX: PTX    ***Tubes/Lines/Drains  ***  Peripheral IV        REVIEW OF SYSTEMS    [ X] A ten-point review of systems was otherwise negative except as noted.  [ ] Due to altered mental status/intubation, subjective information were not able to be obtained from the patient. History was obtained, to the extent possible, from review of the chart and collateral sources of information. SUNIL VILLALOBOS  [id number]  44y Male    Indication for ICU admission:    Admit Date:08-10-19  ICU Date: 8/10/19  OR Date: N/A    No Known Allergies    PAST MEDICAL & SURGICAL HISTORY:  Stroke: 3 weeks ago  HTN (hypertension)    Home Medications:  amlodipine 2.5mg QD  losartan 25 mg QD  ASA 81mg QD  lipitor 80mg QD      24HRS EVENT: Overnight, patient required 2 pushes of 10 IV labetalol for pressures to 150s/100s, with response to 160s/70s. HR remained in 70s throughout. Started on cleviprex gtt for persistently elevated BP. Abdominal exam stable until early AM, with LLQ pain radiating to shoulder. Hgb downtrend to 7.3, 1 upRBC given, repeat 8.4, CANDICE improved, 1.2 from 1.5.    Neurologic: AO x 3  S/p suspected ischemic stroke 6/19  No residual deficits, workup incomplete d/t pt AMA departure  Pain control: morphine    Respiratory:   Subacute nondisplaced L 10th fx  Satting well on RA, encourage IS    Cardiovascular:  Hypertensive overnight, requiring IV labetalol. Of note, patient had received home BP med prior to arrival in ED  HTN  IV labetalol  while NPO  HLD  Home atorvastatin held while NPO    Gastrointestinal/Nutrition:  Grade 3 splenic laceration  No active extrav on CTA, no intervention per IR  Serial abdominal exams  Trend lactates, CBC q6h  4 pRBCs, 2 platelet on hold for possible OR  NPO, NS during this acute phase  GI ppx: PTX    Renal/Genitourinary:   CANDICE  Baseline Cr 1, current Cr 1.2 from1.5  Continue IVF    Hematologic:   Hemoperitoneum on CT  Serial CBCs: 9.2--8.3--next at 2330, 430a  Trend hgb  4 pRBCs, 2 plt on hold    Infectious Disease:   No acute diagnoses.    Endocrine:   DM  On ISS  Q4H FS    Lines/Tubes: 4 PIV        DVT PPX: Held    GI PPX: PTX    ***Tubes/Lines/Drains  ***  Peripheral IV        REVIEW OF SYSTEMS    [ X] A ten-point review of systems was otherwise negative except as noted.  [ ] Due to altered mental status/intubation, subjective information were not able to be obtained from the patient. History was obtained, to the extent possible, from review of the chart and collateral sources of information. SUNIL VILLALOBOS  [id number]  44y Male    Indication for ICU admission:    Admit Date:08-10-19  ICU Date: 8/10/19  OR Date: N/A    No Known Allergies    PAST MEDICAL & SURGICAL HISTORY:  Stroke: 3 weeks ago  HTN (hypertension)    Home Medications:  amlodipine 2.5mg QD  losartan 25 mg QD  ASA 81mg QD  lipitor 80mg QD      24HRS EVENT: Overnight, patient required 2 pushes of 10 IV labetalol for pressures to 150s/100s, with response to 160s/70s. HR remained in 70s throughout. Started on cleviprex gtt for persistently elevated BP. Iff gtt around 5am. Abdominal exam stable until early AM, with LLQ pain radiating to shoulder. Hgb downtrend to 7.3, 1 upRBC given, repeat 8.4, CANDICE improved, 1.2 from 1.5.    Neurologic: AO x 3  S/p suspected ischemic stroke 6/19  No residual deficits, workup incomplete d/t pt AMA departure  Pain control: morphine    Respiratory:   Subacute nondisplaced L 10th fx  Satting well on RA, encourage IS    Cardiovascular:  Hypertensive overnight, requiring IV labetalol. Of note, patient had received home BP med prior to arrival in ED  HTN  IV labetalol  while NPO  HLD  Home atorvastatin held while NPO    Gastrointestinal/Nutrition:  Grade 3 splenic laceration  No active extrav on CTA, no intervention per IR  Serial abdominal exams  Trend lactates, CBC q6h  4 pRBCs, 2 platelet on hold for possible OR  NPO, NS during this acute phase  GI ppx: PTX    Renal/Genitourinary:   CANDICE  Baseline Cr 1, current Cr 1.2 from1.5  Continue IVF    Hematologic:   Hemoperitoneum on CT  Serial CBCs: 9.2--8.3--next at 2330, 430a  Trend hgb  4 pRBCs, 2 plt on hold    Infectious Disease:   No acute diagnoses.    Endocrine:   DM  On ISS  Q4H FS    Lines/Tubes: 4 PIV        DVT PPX: Held    GI PPX: PTX    ***Tubes/Lines/Drains  ***  Peripheral IV        REVIEW OF SYSTEMS    [ X] A ten-point review of systems was otherwise negative except as noted.  [ ] Due to altered mental status/intubation, subjective information were not able to be obtained from the patient. History was obtained, to the extent possible, from review of the chart and collateral sources of information. SUNIL VILLALOBOS  [id number]  44y Male    Indication for ICU admission:    Admit Date:08-10-19  ICU Date: 8/10/19  OR Date: N/A    No Known Allergies    PAST MEDICAL & SURGICAL HISTORY:  Stroke: 3 weeks ago  HTN (hypertension)    Home Medications:  amlodipine 2.5mg QD  losartan 25 mg QD  ASA 81mg QD  lipitor 80mg QD      24HRS EVENT: Overnight, patient required 2 pushes of 10 IV labetalol for pressures to 150s/100s, with response to 160s/70s. HR remained in 70s throughout. Started on cleviprex gtt for persistently elevated BP. Iff gtt around 5am. Abdominal exam stable until early AM, with LLQ pain radiating to shoulder. Hgb downtrend to 7.3, 1 upRBC given, repeat 8.4, CANDICE improved, 1.2 from 1.5.    Neurologic: AO x 3  S/p suspected ischemic stroke 6/19  No residual deficits, workup incomplete d/t pt AMA departure  Pain control: morphine    Respiratory:   Subacute nondisplaced L 10th fx  Satting well on RA, encourage IS    Cardiovascular:  Hypertensive overnight, requiring IV labetalol. Of note, patient had received home BP med prior to arrival in ED  HTN  IV labetalol  while NPO  HLD  Home atorvastatin held while NPO    Gastrointestinal/Nutrition:  Grade 3 splenic laceration  No active extrav on CTA, no intervention per IR  Serial abdominal exams  Trend lactates, CBC q6h  4 pRBCs, 2 platelet on hold for possible OR  NPO, NS during this acute phase  GI ppx: PTX    Renal/Genitourinary:   CANDICE  Baseline Cr 1, current Cr 1.2 from1.5  Continue IVF    Hematologic:   Hemoperitoneum on CT  Serial CBCs: 9.2--8.3--next at 2330, 430a  Trend hgb  4 pRBCs, 2 plt on hold    Infectious Disease:   No acute diagnoses.    Endocrine:   DM  On ISS  Q4H FS    Lines/Tubes: 4 PIV        DVT PPX: Held    GI PPX: PTX    ***Tubes/Lines/Drains  ***  Peripheral IV        REVIEW OF SYSTEMS    [ X] A ten-point review of systems was otherwise negative except as noted.  [ ] Due to altered mental status/intubation, subjective information were not able to be obtained from the patient. History was obtained, to the extent possible, from review of the chart and collateral sources of information.    HD: 1d  Daily Height in cm: 180.34 (10 Aug 2019 19:55)    Daily     Diet, NPO (08-10-19 @ 16:31)      CURRENT MEDS:  Neurologic Medications  morphine  - Injectable 2 milliGRAM(s) IV Push every 4 hours PRN Moderate Pain (4 - 6)  morphine  - Injectable 4 milliGRAM(s) IV Push every 4 hours PRN Severe Pain (7 - 10)  ondansetron Injectable 4 milliGRAM(s) IV Push every 4 hours    Respiratory Medications    Cardiovascular Medications  clevidipine Infusion 1 mG/Hr IV Continuous <Continuous>    Gastrointestinal Medications  dextrose 5%. 1000 milliLiter(s) IV Continuous <Continuous>  pantoprazole  Injectable 40 milliGRAM(s) IV Push daily  sodium chloride 0.9%. 1000 milliLiter(s) IV Continuous <Continuous>    Genitourinary Medications    Hematologic/Oncologic Medications    Antimicrobial/Immunologic Medications    Endocrine/Metabolic Medications  dextrose 40% Gel 15 Gram(s) Oral once PRN Blood Glucose LESS THAN 70 milliGRAM(s)/deciliter  dextrose 50% Injectable 12.5 Gram(s) IV Push once  dextrose 50% Injectable 25 Gram(s) IV Push once  dextrose 50% Injectable 25 Gram(s) IV Push once  glucagon  Injectable 1 milliGRAM(s) IntraMuscular once PRN Glucose LESS THAN 70 milligrams/deciliter  insulin lispro (HumaLOG) corrective regimen sliding scale   SubCutaneous every 6 hours    Topical/Other Medications  chlorhexidine 4% Liquid 1 Application(s) Topical daily      ICU Vital Signs Last 24 Hrs  T(C): 37.1 (11 Aug 2019 04:00), Max: 37.6 (11 Aug 2019 00:00)  T(F): 98.8 (11 Aug 2019 04:00), Max: 99.7 (11 Aug 2019 00:00)  HR: 84 (11 Aug 2019 05:30) (69 - 92)  BP: 145/91 (11 Aug 2019 05:30) (135/78 - 186/114)  BP(mean): 109 (11 Aug 2019 05:30) (103 - 141)  ABP: --  ABP(mean): --  RR: 15 (11 Aug 2019 05:30) (12 - 24)  SpO2: 99% (11 Aug 2019 05:30) (97% - 100%)        I&O's Summary    10 Aug 2019 07:01  -  11 Aug 2019 06:33  --------------------------------------------------------  IN: 1265 mL / OUT: 300 mL / NET: 965 mL      I&O's Detail    10 Aug 2019 07:01  -  11 Aug 2019 06:33  --------------------------------------------------------  IN:    clevidipine Infusion: 15 mL    IV PiggyBack: 50 mL    Packed Red Blood Cells: 300 mL    sodium chloride 0.9%.: 900 mL  Total IN: 1265 mL    OUT:    Voided: 300 mL  Total OUT: 300 mL    Total NET: 965 mL          PHYSICAL EXAM:    General/Neuro  RASS:             GCS:     = 15    Deficits:                             alert & oriented x 3, no focal deficits  Pupils:    Lungs:      clear to auscultation, Normal expansion/effort.     Cardiovascular : S1, S2.  Regular rate and rhythm.    Cardiac Rhythm: Normal Sinus Rhythm    GI: Abdomen soft, diffusely tender, maximal LLQ-tender, non-distended.      Extremities: Extremities warm, , well-perfused. Pulses palp    Derm: Good skin turgor, no skin breakdown.        LABS:    Labs:  CAPILLARY BLOOD GLUCOSE      POCT Blood Glucose.: 110 mg/dL (11 Aug 2019 05:37)  POCT Blood Glucose.: 83 mg/dL (10 Aug 2019 22:10)                          8.4    13.98 )-----------( 147      ( 11 Aug 2019 04:30 )             27.5       Auto Neutrophil %: 83.2 % (08-10-19 @ 16:57)  Auto Immature Granulocyte %: 0.4 % (08-10-19 @ 16:57)  Auto Neutrophil %: 93.9 % (08-10-19 @ 11:45)    08-11    135  |  100  |  20  ----------------------------<  109<H>  4.1   |  25  |  1.2      Calcium, Total Serum: 9.0 mg/dL (08-11-19 @ 00:25)      LFTs:             7.3  | 1.0  | 12       ------------------[73      ( 10 Aug 2019 21:32 )  3.8  | x    | 12          Lipase:x      Amylase:x         Lactate, Blood: 1.1 mmol/L (08-11-19 @ 04:30)  Lactate, Blood: 1.3 mmol/L (08-11-19 @ 00:25)  Blood Gas Venous - Lactate: 2.4 mmoL/L (08-10-19 @ 17:28)      Coags:     16.40  ----< 1.43    ( 11 Aug 2019 00:25 )     29.5        CARDIAC MARKERS ( 11 Aug 2019 04:30 )  x     / <0.01 ng/mL / 51 U/L / x     / <1.0 ng/mL  CARDIAC MARKERS ( 11 Aug 2019 00:25 )  x     / <0.01 ng/mL / 44 U/L / x     / <1.0 ng/mL  CARDIAC MARKERS ( 10 Aug 2019 21:32 )  x     / <0.01 ng/mL / 50 U/L / x     / <1.0 ng/mL          Urinalysis Basic - ( 10 Aug 2019 11:04 )    Color: Dark Yellow / Appearance: Cloudy / SG: >=1.030 / pH: x  Gluc: x / Ketone: Negative  / Bili: Negative / Urobili: 1.0   Blood: x / Protein: 100 / Nitrite: Negative   Leuk Esterase: Negative / RBC: x / WBC x   Sq Epi: x / Non Sq Epi: x / Bacteria: x SUNIL VILLALOBOS  [id number]  44y Male    Indication for ICU admission:    Admit Date:08-10-19  ICU Date: 8/10/19  OR Date: N/A    No Known Allergies    PAST MEDICAL & SURGICAL HISTORY:  Stroke: 3 weeks ago  HTN (hypertension)    Home Medications:  amlodipine 2.5mg QD  losartan 25 mg QD  ASA 81mg QD  lipitor 80mg QD      24HRS EVENT: Overnight, patient required 2 pushes of 10 IV labetalol for pressures to 150s/100s, with response to 160s/70s. HR remained in 70s throughout. Started on cleviprex gtt for persistently elevated BP. Iff gtt around 5am. Abdominal exam stable until early AM, with LLQ pain radiating to shoulder. Hgb downtrend to 7.3, 1 upRBC given, repeat 8.4, CANDICE improved, 1.2 from 1.5.    Neurologic: AO x 3  S/p suspected ischemic stroke 6/19  No residual deficits, workup incomplete d/t pt AMA departure  Pain control: morphine    Respiratory:   Subacute nondisplaced L 10th fx  Satting well on RA, encourage IS    Cardiovascular:  Hypertensive overnight, requiring IV labetalol, started on cleviprex gtt, weaned off in early AM. Of note, patient had received home BP med prior to arrival in ED  HTN  IV labetalol  while NPO  HLD  Home atorvastatin held while NPO    Gastrointestinal/Nutrition:  Grade 3 splenic laceration  No active extrav on CTA, no intervention per IR  Serial abdominal exams  Trend lactates, CBC q6h  4 pRBCs, 2 platelet on hold for possible OR  NPO, NS during this acute phase  GI ppx: PTX    Renal/Genitourinary:   CANDICE  Baseline Cr 1, current Cr 1.2 from1.5  Continue IVF    Hematologic:   Hemoperitoneum on CT  Serial CBCs: 9.2--8.3--7.3--8.4  Trend hgb, s/p 1upRBC  4 pRBCs, 2 plt on hold    Infectious Disease:   No acute diagnoses.    Endocrine:   DM  On ISS  Q4H FS    Lines/Tubes: 4 PIV        DVT PPX: Held    GI PPX: PTX    ***Tubes/Lines/Drains  ***  Peripheral IV        REVIEW OF SYSTEMS    [ X] A ten-point review of systems was otherwise negative except as noted.  [ ] Due to altered mental status/intubation, subjective information were not able to be obtained from the patient. History was obtained, to the extent possible, from review of the chart and collateral sources of information.    HD: 1d  Daily Height in cm: 180.34 (10 Aug 2019 19:55)    Daily     Diet, NPO (08-10-19 @ 16:31)      CURRENT MEDS:  Neurologic Medications  morphine  - Injectable 2 milliGRAM(s) IV Push every 4 hours PRN Moderate Pain (4 - 6)  morphine  - Injectable 4 milliGRAM(s) IV Push every 4 hours PRN Severe Pain (7 - 10)  ondansetron Injectable 4 milliGRAM(s) IV Push every 4 hours    Respiratory Medications    Cardiovascular Medications  clevidipine Infusion 1 mG/Hr IV Continuous <Continuous>    Gastrointestinal Medications  dextrose 5%. 1000 milliLiter(s) IV Continuous <Continuous>  pantoprazole  Injectable 40 milliGRAM(s) IV Push daily  sodium chloride 0.9%. 1000 milliLiter(s) IV Continuous <Continuous>    Genitourinary Medications    Hematologic/Oncologic Medications    Antimicrobial/Immunologic Medications    Endocrine/Metabolic Medications  dextrose 40% Gel 15 Gram(s) Oral once PRN Blood Glucose LESS THAN 70 milliGRAM(s)/deciliter  dextrose 50% Injectable 12.5 Gram(s) IV Push once  dextrose 50% Injectable 25 Gram(s) IV Push once  dextrose 50% Injectable 25 Gram(s) IV Push once  glucagon  Injectable 1 milliGRAM(s) IntraMuscular once PRN Glucose LESS THAN 70 milligrams/deciliter  insulin lispro (HumaLOG) corrective regimen sliding scale   SubCutaneous every 6 hours    Topical/Other Medications  chlorhexidine 4% Liquid 1 Application(s) Topical daily      ICU Vital Signs Last 24 Hrs  T(C): 37.1 (11 Aug 2019 04:00), Max: 37.6 (11 Aug 2019 00:00)  T(F): 98.8 (11 Aug 2019 04:00), Max: 99.7 (11 Aug 2019 00:00)  HR: 84 (11 Aug 2019 05:30) (69 - 92)  BP: 145/91 (11 Aug 2019 05:30) (135/78 - 186/114)  BP(mean): 109 (11 Aug 2019 05:30) (103 - 141)  ABP: --  ABP(mean): --  RR: 15 (11 Aug 2019 05:30) (12 - 24)  SpO2: 99% (11 Aug 2019 05:30) (97% - 100%)        I&O's Summary    10 Aug 2019 07:01  -  11 Aug 2019 06:33  --------------------------------------------------------  IN: 1265 mL / OUT: 300 mL / NET: 965 mL      I&O's Detail    10 Aug 2019 07:01  -  11 Aug 2019 06:33  --------------------------------------------------------  IN:    clevidipine Infusion: 15 mL    IV PiggyBack: 50 mL    Packed Red Blood Cells: 300 mL    sodium chloride 0.9%.: 900 mL  Total IN: 1265 mL    OUT:    Voided: 300 mL  Total OUT: 300 mL    Total NET: 965 mL          PHYSICAL EXAM:    General/Neuro  RASS:             GCS:     = 15    Deficits:                             alert & oriented x 3, no focal deficits  Pupils:    Lungs:      clear to auscultation, Normal expansion/effort.     Cardiovascular : S1, S2.  Regular rate and rhythm.    Cardiac Rhythm: Normal Sinus Rhythm    GI: Abdomen soft, diffusely tender, maximal LLQ-tender, non-distended.      Extremities: Extremities warm, , well-perfused. Pulses palp    Derm: Good skin turgor, no skin breakdown.        LABS:    Labs:  CAPILLARY BLOOD GLUCOSE      POCT Blood Glucose.: 110 mg/dL (11 Aug 2019 05:37)  POCT Blood Glucose.: 83 mg/dL (10 Aug 2019 22:10)                          8.4    13.98 )-----------( 147      ( 11 Aug 2019 04:30 )             27.5       Auto Neutrophil %: 83.2 % (08-10-19 @ 16:57)  Auto Immature Granulocyte %: 0.4 % (08-10-19 @ 16:57)  Auto Neutrophil %: 93.9 % (08-10-19 @ 11:45)    08-11    135  |  100  |  20  ----------------------------<  109<H>  4.1   |  25  |  1.2      Calcium, Total Serum: 9.0 mg/dL (08-11-19 @ 00:25)      LFTs:             7.3  | 1.0  | 12       ------------------[73      ( 10 Aug 2019 21:32 )  3.8  | x    | 12          Lipase:x      Amylase:x         Lactate, Blood: 1.1 mmol/L (08-11-19 @ 04:30)  Lactate, Blood: 1.3 mmol/L (08-11-19 @ 00:25)  Blood Gas Venous - Lactate: 2.4 mmoL/L (08-10-19 @ 17:28)      Coags:     16.40  ----< 1.43    ( 11 Aug 2019 00:25 )     29.5        CARDIAC MARKERS ( 11 Aug 2019 04:30 )  x     / <0.01 ng/mL / 51 U/L / x     / <1.0 ng/mL  CARDIAC MARKERS ( 11 Aug 2019 00:25 )  x     / <0.01 ng/mL / 44 U/L / x     / <1.0 ng/mL  CARDIAC MARKERS ( 10 Aug 2019 21:32 )  x     / <0.01 ng/mL / 50 U/L / x     / <1.0 ng/mL          Urinalysis Basic - ( 10 Aug 2019 11:04 )    Color: Dark Yellow / Appearance: Cloudy / SG: >=1.030 / pH: x  Gluc: x / Ketone: Negative  / Bili: Negative / Urobili: 1.0   Blood: x / Protein: 100 / Nitrite: Negative   Leuk Esterase: Negative / RBC: x / WBC x   Sq Epi: x / Non Sq Epi: x / Bacteria: x

## 2019-08-12 LAB
ANION GAP SERPL CALC-SCNC: 11 MMOL/L — SIGNIFICANT CHANGE UP (ref 7–14)
APTT BLD: 30.1 SEC — SIGNIFICANT CHANGE UP (ref 27–39.2)
BUN SERPL-MCNC: 19 MG/DL — SIGNIFICANT CHANGE UP (ref 10–20)
CALCIUM SERPL-MCNC: 8.7 MG/DL — SIGNIFICANT CHANGE UP (ref 8.5–10.1)
CHLORIDE SERPL-SCNC: 99 MMOL/L — SIGNIFICANT CHANGE UP (ref 98–110)
CO2 SERPL-SCNC: 26 MMOL/L — SIGNIFICANT CHANGE UP (ref 17–32)
CREAT SERPL-MCNC: 1.1 MG/DL — SIGNIFICANT CHANGE UP (ref 0.7–1.5)
ESTIMATED AVERAGE GLUCOSE: 85 MG/DL — SIGNIFICANT CHANGE UP (ref 68–114)
ESTIMATED AVERAGE GLUCOSE: 91 MG/DL — SIGNIFICANT CHANGE UP (ref 68–114)
GLUCOSE BLDC GLUCOMTR-MCNC: 111 MG/DL — HIGH (ref 70–99)
GLUCOSE BLDC GLUCOMTR-MCNC: 117 MG/DL — HIGH (ref 70–99)
GLUCOSE BLDC GLUCOMTR-MCNC: 89 MG/DL — SIGNIFICANT CHANGE UP (ref 70–99)
GLUCOSE SERPL-MCNC: 137 MG/DL — HIGH (ref 70–99)
HBA1C BLD-MCNC: 4.6 % — SIGNIFICANT CHANGE UP (ref 4–5.6)
HBA1C BLD-MCNC: 4.8 % — SIGNIFICANT CHANGE UP (ref 4–5.6)
HCT VFR BLD CALC: 22.4 % — LOW (ref 42–52)
HCT VFR BLD CALC: 22.6 % — LOW (ref 42–52)
HCT VFR BLD CALC: 23.2 % — LOW (ref 42–52)
HCT VFR BLD CALC: 24.5 % — LOW (ref 42–52)
HGB BLD-MCNC: 7 G/DL — LOW (ref 14–18)
HGB BLD-MCNC: 7.1 G/DL — LOW (ref 14–18)
HGB BLD-MCNC: 7.2 G/DL — LOW (ref 14–18)
HGB BLD-MCNC: 7.7 G/DL — LOW (ref 14–18)
INR BLD: 1.4 RATIO — HIGH (ref 0.65–1.3)
MAGNESIUM SERPL-MCNC: 2.1 MG/DL — SIGNIFICANT CHANGE UP (ref 1.8–2.4)
MCHC RBC-ENTMCNC: 20 PG — LOW (ref 27–31)
MCHC RBC-ENTMCNC: 21 PG — LOW (ref 27–31)
MCHC RBC-ENTMCNC: 22.2 PG — LOW (ref 27–31)
MCHC RBC-ENTMCNC: 22.3 PG — LOW (ref 27–31)
MCHC RBC-ENTMCNC: 31 G/DL — LOW (ref 32–37)
MCHC RBC-ENTMCNC: 31 G/DL — LOW (ref 32–37)
MCHC RBC-ENTMCNC: 31.4 G/DL — LOW (ref 32–37)
MCHC RBC-ENTMCNC: 31.7 G/DL — LOW (ref 32–37)
MCV RBC AUTO: 64.6 FL — LOW (ref 80–94)
MCV RBC AUTO: 67.6 FL — LOW (ref 80–94)
MCV RBC AUTO: 70 FL — LOW (ref 80–94)
MCV RBC AUTO: 70.8 FL — LOW (ref 80–94)
NRBC # BLD: 0 /100 WBCS — SIGNIFICANT CHANGE UP (ref 0–0)
PHOSPHATE SERPL-MCNC: 3.3 MG/DL — SIGNIFICANT CHANGE UP (ref 2.1–4.9)
PLATELET # BLD AUTO: 136 K/UL — SIGNIFICANT CHANGE UP (ref 130–400)
PLATELET # BLD AUTO: 151 K/UL — SIGNIFICANT CHANGE UP (ref 130–400)
PLATELET # BLD AUTO: 153 K/UL — SIGNIFICANT CHANGE UP (ref 130–400)
PLATELET # BLD AUTO: 160 K/UL — SIGNIFICANT CHANGE UP (ref 130–400)
POTASSIUM SERPL-MCNC: 4.3 MMOL/L — SIGNIFICANT CHANGE UP (ref 3.5–5)
POTASSIUM SERPL-SCNC: 4.3 MMOL/L — SIGNIFICANT CHANGE UP (ref 3.5–5)
PROTHROM AB SERPL-ACNC: 16.1 SEC — HIGH (ref 9.95–12.87)
RBC # BLD: 3.2 M/UL — LOW (ref 4.7–6.1)
RBC # BLD: 3.43 M/UL — LOW (ref 4.7–6.1)
RBC # BLD: 3.46 M/UL — LOW (ref 4.7–6.1)
RBC # BLD: 3.5 M/UL — LOW (ref 4.7–6.1)
RBC # FLD: 19.2 % — HIGH (ref 11.5–14.5)
RBC # FLD: 21.3 % — HIGH (ref 11.5–14.5)
RBC # FLD: 23.9 % — HIGH (ref 11.5–14.5)
RBC # FLD: 24.2 % — HIGH (ref 11.5–14.5)
SODIUM SERPL-SCNC: 136 MMOL/L — SIGNIFICANT CHANGE UP (ref 135–146)
WBC # BLD: 10.43 K/UL — SIGNIFICANT CHANGE UP (ref 4.8–10.8)
WBC # BLD: 10.49 K/UL — SIGNIFICANT CHANGE UP (ref 4.8–10.8)
WBC # BLD: 14.06 K/UL — HIGH (ref 4.8–10.8)
WBC # BLD: 15.37 K/UL — HIGH (ref 4.8–10.8)
WBC # FLD AUTO: 10.43 K/UL — SIGNIFICANT CHANGE UP (ref 4.8–10.8)
WBC # FLD AUTO: 10.49 K/UL — SIGNIFICANT CHANGE UP (ref 4.8–10.8)
WBC # FLD AUTO: 14.06 K/UL — HIGH (ref 4.8–10.8)
WBC # FLD AUTO: 15.37 K/UL — HIGH (ref 4.8–10.8)

## 2019-08-12 PROCEDURE — 36247 INS CATH ABD/L-EXT ART 3RD: CPT

## 2019-08-12 PROCEDURE — 74174 CTA ABD&PLVS W/CONTRAST: CPT | Mod: 26

## 2019-08-12 PROCEDURE — 71045 X-RAY EXAM CHEST 1 VIEW: CPT | Mod: 26

## 2019-08-12 PROCEDURE — 76937 US GUIDE VASCULAR ACCESS: CPT | Mod: 26

## 2019-08-12 PROCEDURE — 37244 VASC EMBOLIZE/OCCLUDE BLEED: CPT

## 2019-08-12 PROCEDURE — 99152 MOD SED SAME PHYS/QHP 5/>YRS: CPT

## 2019-08-12 PROCEDURE — 99233 SBSQ HOSP IP/OBS HIGH 50: CPT

## 2019-08-12 PROCEDURE — 36248 INS CATH ABD/L-EXT ART ADDL: CPT

## 2019-08-12 RX ORDER — SODIUM CHLORIDE 9 MG/ML
1000 INJECTION, SOLUTION INTRAVENOUS
Refills: 0 | Status: DISCONTINUED | OUTPATIENT
Start: 2019-08-12 | End: 2019-08-13

## 2019-08-12 RX ORDER — AMLODIPINE BESYLATE 2.5 MG/1
2.5 TABLET ORAL DAILY
Refills: 0 | Status: DISCONTINUED | OUTPATIENT
Start: 2019-08-12 | End: 2019-08-15

## 2019-08-12 RX ORDER — SENNA PLUS 8.6 MG/1
2 TABLET ORAL AT BEDTIME
Refills: 0 | Status: DISCONTINUED | OUTPATIENT
Start: 2019-08-12 | End: 2019-08-15

## 2019-08-12 RX ORDER — LABETALOL HCL 100 MG
10 TABLET ORAL ONCE
Refills: 0 | Status: COMPLETED | OUTPATIENT
Start: 2019-08-12 | End: 2019-08-12

## 2019-08-12 RX ADMIN — OXYCODONE HYDROCHLORIDE 5 MILLIGRAM(S): 5 TABLET ORAL at 02:35

## 2019-08-12 RX ADMIN — Medication 650 MILLIGRAM(S): at 12:27

## 2019-08-12 RX ADMIN — OXYCODONE HYDROCHLORIDE 5 MILLIGRAM(S): 5 TABLET ORAL at 02:05

## 2019-08-12 RX ADMIN — OXYCODONE HYDROCHLORIDE 10 MILLIGRAM(S): 5 TABLET ORAL at 21:41

## 2019-08-12 RX ADMIN — OXYCODONE HYDROCHLORIDE 10 MILLIGRAM(S): 5 TABLET ORAL at 15:37

## 2019-08-12 RX ADMIN — HEPARIN SODIUM 5000 UNIT(S): 5000 INJECTION INTRAVENOUS; SUBCUTANEOUS at 05:12

## 2019-08-12 RX ADMIN — Medication 650 MILLIGRAM(S): at 13:00

## 2019-08-12 RX ADMIN — SODIUM CHLORIDE 100 MILLILITER(S): 9 INJECTION, SOLUTION INTRAVENOUS at 07:48

## 2019-08-12 RX ADMIN — PANTOPRAZOLE SODIUM 40 MILLIGRAM(S): 20 TABLET, DELAYED RELEASE ORAL at 06:27

## 2019-08-12 RX ADMIN — AMLODIPINE BESYLATE 2.5 MILLIGRAM(S): 2.5 TABLET ORAL at 05:12

## 2019-08-12 RX ADMIN — OXYCODONE HYDROCHLORIDE 5 MILLIGRAM(S): 5 TABLET ORAL at 12:27

## 2019-08-12 RX ADMIN — Medication 10 MILLIGRAM(S): at 23:30

## 2019-08-12 RX ADMIN — Medication 10 MILLIGRAM(S): at 21:01

## 2019-08-12 RX ADMIN — Medication 650 MILLIGRAM(S): at 05:12

## 2019-08-12 RX ADMIN — OXYCODONE HYDROCHLORIDE 10 MILLIGRAM(S): 5 TABLET ORAL at 22:11

## 2019-08-12 RX ADMIN — ATORVASTATIN CALCIUM 80 MILLIGRAM(S): 80 TABLET, FILM COATED ORAL at 21:42

## 2019-08-12 RX ADMIN — CHLORHEXIDINE GLUCONATE 1 APPLICATION(S): 213 SOLUTION TOPICAL at 05:13

## 2019-08-12 RX ADMIN — OXYCODONE HYDROCHLORIDE 10 MILLIGRAM(S): 5 TABLET ORAL at 16:30

## 2019-08-12 RX ADMIN — Medication 650 MILLIGRAM(S): at 05:42

## 2019-08-12 RX ADMIN — OXYCODONE HYDROCHLORIDE 5 MILLIGRAM(S): 5 TABLET ORAL at 13:00

## 2019-08-12 NOTE — PROGRESS NOTE ADULT - SUBJECTIVE AND OBJECTIVE BOX
SUNIL VILLALOBOS  [id number]  44y Male    Indication for ICU admission:    Admit Date:08-10-19  ICU Date: 8/10/19  OR Date: N/A    No Known Allergies    PAST MEDICAL & SURGICAL HISTORY:  Stroke: 3 weeks ago  HTN (hypertension)    Home Medications:  amlodipine 2.5mg QD  losartan 25 mg QD  ASA 81mg QD  lipitor 80mg QD      24HRS EVENT:  Overnight: Hgb 7 (from 7.5), 1u prbc given.    Neurologic: AO x 3  S/p suspected ischemic stroke 6/19 ? s/p tPA  No residual deficits, workup incomplete d/t pt AMA departure  F/u syncope w/u: EEG- neg, Carotid duplex pending   Pain control: Tylenol, oxy PRN     Respiratory:   Subacute nondisplaced L 10th fx  Satting well on RA, encourage IS    Cardiovascular:   HTN - Clevoprex gtt now off, restarted home amlodipine, Holding losartan 2/2 CANDICE  HLD- Home atorvastatin restarted  Echo - EF 71%, G1DD, mod LVH    Gastrointestinal/Nutrition:  Grade 3 splenic lac - No active extrav on CTA, no intervention per IR  Serial abdominal exams, Trend lactates, CBC q6h  4 pRBCs, 2 platelet on hold for possible OR  advanced to diet   GI ppx: PTX    Renal/Genitourinary:   CANDICE - Baseline Cr 1, current Cr 1.5 ->1.2  IVL, Voiding      Hematologic:   Hemoperitoneum on CT  Serial CBCs: 9.2--8.3>7.3 (1pRBC)> 8.4>8.4>7.5>7 (1pRBC) >  Trend hgb - 4 pRBCs, 2 plt on hold,   started HSQ    Infectious Disease:   No acute diagnoses.    Endocrine:   DM - On ISS  Q4H FS    MSK  OOBTC      DVT PTX: HSQ    GI PTX: PPI    ***Tubes/Lines/Drains  ***  Peripheral IV      REVIEW OF SYSTEMS    [x ] A ten-point review of systems was otherwise negative except as noted.  [ ] Due to altered mental status/intubation, subjective information were not able to be obtained from the patient. History was obtained, to the extent possible, from review of the chart and collateral sources of information. SUNIL VILLALOBOS  [id number]  44y Male    Indication for ICU admission:    Admit Date:08-10-19  ICU Date: 8/10/19  OR Date: N/A    No Known Allergies    PAST MEDICAL & SURGICAL HISTORY:  Stroke: 3 weeks ago  HTN (hypertension)    Home Medications:  amlodipine 2.5mg QD  losartan 25 mg QD  ASA 81mg QD  lipitor 80mg QD      24HRS EVENT:  Overnight: Hgb 7 (from 7.5), 1u prbc given.    Neurologic: AO x 3  S/p suspected ischemic stroke  ? s/p tPA  No residual deficits, workup incomplete d/t pt AMA departure  F/u syncope w/u: EEG- neg, Carotid duplex pending   Pain control: Tylenol, oxy PRN     Respiratory:   Subacute nondisplaced L 10th fx  Satting well on RA, encourage IS    Cardiovascular:   HTN - Clevoprex gtt now off, restarted home amlodipine, Holding losartan 2/2 CANDICE  HLD- Home atorvastatin restarted  Echo - EF 71%, G1DD, mod LVH    Gastrointestinal/Nutrition:  Grade 3 splenic lac - No active extrav on CTA, no intervention per IR  Serial abdominal exams, Trend lactates, CBC q6h  4 pRBCs, 2 platelet on hold for possible OR  advanced to diet   GI ppx: PTX    Renal/Genitourinary:   CANDICE - Baseline Cr 1, current Cr 1.5 ->1.2  IVL, Voiding      Hematologic:   Hemoperitoneum on CT  Serial CBCs: 9.2--8.3>7.3 (1pRBC)> 8.4>8.4>7.5>7 (1pRBC) >  Trend hgb - 4 pRBCs, 2 plt on hold,   started HSQ    Infectious Disease:   No acute diagnoses.    Endocrine:   DM - On ISS  Q4H FS    MSK  OOBTC      DVT PTX: HSQ    GI PTX: PPI    ***Tubes/Lines/Drains  ***  Peripheral IV      REVIEW OF SYSTEMS    [x ] A ten-point review of systems was otherwise negative except as noted.  [ ] Due to altered mental status/intubation, subjective information were not able to be obtained from the patient. History was obtained, to the extent possible, from review of the chart and collateral sources of information.    Daily     Daily Weight in k.3 (12 Aug 2019 04:00)    Diet, NPO (19 @ 06:37)      CURRENT MEDS:  Neurologic Medications  acetaminophen   Tablet .. 650 milliGRAM(s) Oral every 6 hours  oxyCODONE    IR 5 milliGRAM(s) Oral every 6 hours PRN Moderate Pain (4 - 6)  oxyCODONE    IR 10 milliGRAM(s) Oral every 6 hours PRN Severe Pain (7 - 10)    Respiratory Medications    Cardiovascular Medications  amLODIPine   Tablet 2.5 milliGRAM(s) Oral daily    Gastrointestinal Medications  dextrose 5%. 1000 milliLiter(s) IV Continuous <Continuous>  lactated ringers. 1000 milliLiter(s) IV Continuous <Continuous>  pantoprazole    Tablet 40 milliGRAM(s) Oral before breakfast    Genitourinary Medications    Hematologic/Oncologic Medications  heparin  Injectable 5000 Unit(s) SubCutaneous every 8 hours    Antimicrobial/Immunologic Medications    Endocrine/Metabolic Medications  atorvastatin 80 milliGRAM(s) Oral at bedtime  dextrose 40% Gel 15 Gram(s) Oral once PRN Blood Glucose LESS THAN 70 milliGRAM(s)/deciliter  dextrose 50% Injectable 12.5 Gram(s) IV Push once  dextrose 50% Injectable 25 Gram(s) IV Push once  dextrose 50% Injectable 25 Gram(s) IV Push once  glucagon  Injectable 1 milliGRAM(s) IntraMuscular once PRN Glucose LESS THAN 70 milligrams/deciliter  insulin lispro (HumaLOG) corrective regimen sliding scale   SubCutaneous every 6 hours    Topical/Other Medications  chlorhexidine 4% Liquid 1 Application(s) Topical daily      ICU Vital Signs Last 24 Hrs  T(C): 36.4 (12 Aug 2019 04:00), Max: 37.3 (11 Aug 2019 12:00)  T(F): 97.6 (12 Aug 2019 04:00), Max: 99.1 (11 Aug 2019 12:00)  HR: 86 (12 Aug 2019 07:00) (74 - 112)  BP: 149/83 (12 Aug 2019 07:00) (105/63 - 174/97)  BP(mean): 103 (12 Aug 2019 07:00) (74 - 130)  ABP: --  ABP(mean): --  RR: 17 (12 Aug 2019 07:00) (13 - 29)  SpO2: 100% (12 Aug 2019 07:00) (95% - 100%)      Adult Advanced Hemodynamics Last 24 Hrs  CVP(mm Hg): --  CVP(cm H2O): --  CO: --  CI: --  PA: --  PA(mean): --  PCWP: --  SVR: --  SVRI: --  PVR: --  PVRI: --          I&O's Summary    11 Aug 2019 07:  -  12 Aug 2019 07:00  --------------------------------------------------------  IN: 1010 mL / OUT: 1200 mL / NET: -190 mL      I&O's Detail    11 Aug 2019 07:  -  12 Aug 2019 07:00  --------------------------------------------------------  IN:    Oral Fluid: 240 mL    Packed Red Blood Cells: 270 mL    sodium chloride 0.9%: 500 mL  Total IN: 1010 mL    OUT:    Voided: 1200 mL  Total OUT: 1200 mL    Total NET: -190 mL          PHYSICAL EXAM:    General/Neuro  RASS:             GCS:     = E   / V   / M      Deficits:                             alert & oriented x 3, no focal deficits  Pupils:    Lungs:      clear to auscultation, Normal expansion/effort.     Cardiovascular : S1, S2.  Regular rate and rhythm.  Peripheral edema   Cardiac Rhythm: Normal Sinus Rhythm    GI: Abdomen soft, Non-tender, Non-distended.    Gastrostomy / Jejunostomy tube in place.  Nasogastric tube in place.  Colostomy / Ileostomy.    Wound:    Extremities: Extremities warm, pink, well-perfused. Pulses:Rt     Lt    Derm: Good skin turgor, no skin breakdown.      :       Waters catheter in place.      Imaging:     LABS:  CAPILLARY BLOOD GLUCOSE      POCT Blood Glucose.: 79 mg/dL (11 Aug 2019 17:19)  POCT Blood Glucose.: 114 mg/dL (11 Aug 2019 11:16)  POCT Blood Glucose.: 92 mg/dL (11 Aug 2019 09:41)  POCT Blood Glucose.: 68 mg/dL (11 Aug 2019 08:19)                          7.2    14.06 )-----------( 160      ( 12 Aug 2019 05:07 )             23.2       08-12    136  |  99  |  19  ----------------------------<  137<H>  4.3   |  26  |  1.1    Ca    8.7      12 Aug 2019 00:34  Phos  3.3     08-12  Mg     2.1     08-12    TPro  7.3  /  Alb  3.8  /  TBili  1.0  /  DBili  x   /  AST  12  /  ALT  12  /  AlkPhos  73  08-10      PT/INR - ( 12 Aug 2019 00:34 )   PT: 16.10 sec;   INR: 1.40 ratio         PTT - ( 12 Aug 2019 00:34 )  PTT:30.1 sec  CARDIAC MARKERS ( 11 Aug 2019 04:30 )  x     / <0.01 ng/mL / 51 U/L / x     / <1.0 ng/mL  CARDIAC MARKERS ( 11 Aug 2019 00:25 )  x     / <0.01 ng/mL / 44 U/L / x     / <1.0 ng/mL  CARDIAC MARKERS ( 10 Aug 2019 21:32 )  x     / <0.01 ng/mL / 50 U/L / x     / <1.0 ng/mL      Urinalysis Basic - ( 10 Aug 2019 11:04 )    Color: Dark Yellow / Appearance: Cloudy / SG: >=1.030 / pH: x  Gluc: x / Ketone: Negative  / Bili: Negative / Urobili: 1.0   Blood: x / Protein: 100 / Nitrite: Negative   Leuk Esterase: Negative / RBC: x / WBC x   Sq Epi: x / Non Sq Epi: x / Bacteria: x

## 2019-08-12 NOTE — CONSULT NOTE ADULT - SUBJECTIVE AND OBJECTIVE BOX
INTERVENTIONAL RADIOLOGY CONSULT:     Procedure Requested: splenic artery embolization    HPI:  44 y.o. male with history of hypertension and ischemic stroke 2 months ago, at which time he received tPa bolus and signed out AMA on hospital day #1, presents to ED today with 5 day history of worsening LLQ abdominal pain that is now diffuse.  He reports vomiting.  No fever, chills, diarrhea or constipation. (10 Aug 2019 15:57)      PAST MEDICAL & SURGICAL HISTORY:  Stroke: 3 weeks ago  HTN (hypertension)  No significant past surgical history      MEDICATIONS  (STANDING):  acetaminophen   Tablet .. 650 milliGRAM(s) Oral every 6 hours  amLODIPine   Tablet 2.5 milliGRAM(s) Oral daily  atorvastatin 80 milliGRAM(s) Oral at bedtime  chlorhexidine 4% Liquid 1 Application(s) Topical daily  dextrose 5%. 1000 milliLiter(s) (50 mL/Hr) IV Continuous <Continuous>  dextrose 50% Injectable 12.5 Gram(s) IV Push once  dextrose 50% Injectable 25 Gram(s) IV Push once  dextrose 50% Injectable 25 Gram(s) IV Push once  insulin lispro (HumaLOG) corrective regimen sliding scale   SubCutaneous every 6 hours  lactated ringers. 1000 milliLiter(s) (100 mL/Hr) IV Continuous <Continuous>  pantoprazole    Tablet 40 milliGRAM(s) Oral before breakfast    MEDICATIONS  (PRN):  dextrose 40% Gel 15 Gram(s) Oral once PRN Blood Glucose LESS THAN 70 milliGRAM(s)/deciliter  glucagon  Injectable 1 milliGRAM(s) IntraMuscular once PRN Glucose LESS THAN 70 milligrams/deciliter  oxyCODONE    IR 5 milliGRAM(s) Oral every 6 hours PRN Moderate Pain (4 - 6)  oxyCODONE    IR 10 milliGRAM(s) Oral every 6 hours PRN Severe Pain (7 - 10)      Allergies    No Known Allergies    Intolerances    FAMILY HISTORY:  FH: type 2 diabetes      Physical Exam:   Vital Signs Last 24 Hrs  T(C): 36.4 (12 Aug 2019 04:00), Max: 37.3 (11 Aug 2019 12:00)  T(F): 97.6 (12 Aug 2019 04:00), Max: 99.1 (11 Aug 2019 12:00)  HR: 86 (12 Aug 2019 07:00) (74 - 112)  BP: 149/83 (12 Aug 2019 07:00) (105/63 - 174/97)  BP(mean): 103 (12 Aug 2019 07:00) (74 - 130)  RR: 17 (12 Aug 2019 07:00) (13 - 29)  SpO2: 100% (12 Aug 2019 07:00) (95% - 100%)      Labs:                         7.2    14.06 )-----------( 160      ( 12 Aug 2019 05:07 )             23.2     08-12    136  |  99  |  19  ----------------------------<  137<H>  4.3   |  26  |  1.1    Ca    8.7      12 Aug 2019 00:34  Phos  3.3     08-12  Mg     2.1     08-12    TPro  7.3  /  Alb  3.8  /  TBili  1.0  /  DBili  x   /  AST  12  /  ALT  12  /  AlkPhos  73  08-10    PT/INR - ( 12 Aug 2019 00:34 )   PT: 16.10 sec;   INR: 1.40 ratio         PTT - ( 12 Aug 2019 00:34 )  PTT:30.1 sec    Pertinent labs:                      7.2    14.06 )-----------( 160      ( 12 Aug 2019 05:07 )             23.2       08-12    136  |  99  |  19  ----------------------------<  137<H>  4.3   |  26  |  1.1    Ca    8.7      12 Aug 2019 00:34  Phos  3.3     08-12  Mg     2.1     08-12    TPro  7.3  /  Alb  3.8  /  TBili  1.0  /  DBili  x   /  AST  12  /  ALT  12  /  AlkPhos  73  08-10      PT/INR - ( 12 Aug 2019 00:34 )   PT: 16.10 sec;   INR: 1.40 ratio         PTT - ( 12 Aug 2019 00:34 )  PTT:30.1 sec    Radiology & Additional Studies:     < from: CT Angio Abdomen and Pelvis w/ IV Cont (08.10.19 @ 16:09) >  IMPRESSION:    No evidence of active arterial extravasation. Stable examination,   including previously described splenic hematomas, associated   hemoperitoneum, and left intra-abdominal hematoma.    < end of copied text >      Radiology imaging reviewed.       ASSESSMENT/ PLAN:   44 y.o. male with history of hypertension and ischemic stroke 2 months ago, at which time he received tPa bolus and signed out AMA on hospital day #1, presents to ED today with 5 day history of worsening LLQ abdominal pain that is now diffuse.   - consulted for splenic artery embolization d/t splenic hematoma  - pending repeat CTA - initial CTA confirmed no active arterial extravasation  - if CTA + or hematoma expanding will plan for splenic artery embolization today 8/12  - keep patient NPO    Thank you for the courtesy of this consult, please call [phone] with any further questions.

## 2019-08-12 NOTE — PROGRESS NOTE ADULT - SUBJECTIVE AND OBJECTIVE BOX
INTERVENTIONAL RADIOLOGY BRIEF-OPERATIVE NOTE    Procedure: Mesenteric angiogram, selective embolization of bleeding splenic artery branch    Pre-Op Diagnosis: Splenic laceration    Post-Op Diagnosis: Same    Attending: Elliot Landau  Resident: Sudarshan Jiang    Anesthesia (type):  [ ] General Anesthesia  [x] Sedation  [ ] Spinal Anesthesia  [x] Local/Regional    Contrast: 80 cc Optiray 320     Estimated Blood Loss: < 5 cc    Condition:   [ ] Critical  [ ] Serious  [ ] Fair   [x] Good    Findings/Follow up Plan of Care: Celiac angiogram demonstrated active extravasation from two areas of inferior splenic artery branch. Embolization performed using 3 cc Gelfoam slurry and one 4 mm x 10 cm coil. Post embolization angiogram demonstrated no further extravasation. Patient tolerated procedure well. Right femoral access site closed with Mynx closure device and manual compression.      Specimens Removed: None    Implants: 4 mm x 10 cm coil.    Complications: None    Disposition: Return to ICU      Please call Interventional Radiology [phone] with any questions, concerns, or issues.

## 2019-08-12 NOTE — PROGRESS NOTE ADULT - ASSESSMENT
44y M PMH HTN s/p suspected stroke treated w/ tPA bolus 6/6/19, DM presenting s/p fall (sometime in past week) w grade 3 splenic laceration, hemoperitoneum, without active extravasation.    PLAN:   Neurologic: AO x 3  S/p suspected ischemic stroke 6/19 ? s/p tPA  No residual deficits, workup incomplete d/t pt AMA departure  F/u syncope w/u: EEG- neg, Carotid duplex pending   Pain control: Tylenol, oxy PRN     Respiratory:   Subacute nondisplaced L 10th fx  Satting well on RA, encourage IS    Cardiovascular:   HTN - Clevoprex gtt now off, restarted home amlodipine, Holding losartan 2/2 CANDICE  HLD- Home atorvastatin restarted  Echo - EF 71%, G1DD, mod LVH    Gastrointestinal/Nutrition:  Grade 3 splenic lac - No active extrav on CTA, no intervention per IR  Serial abdominal exams, Trend lactates, CBC q6h  4 pRBCs, 2 platelet on hold for possible OR  advanced to diet   GI ppx: PTX    Renal/Genitourinary:   CANDICE - Baseline Cr 1, current Cr 1.5 ->1.2  IVL, Voiding      Hematologic:   Hemoperitoneum on CT  Serial CBCs: 9.2--8.3>7.3 (1pRBC)> 8.4>8.4>7.5>7 (1pRBC) >  Trend hgb - 4 pRBCs, 2 plt on hold,   started HSQ    Infectious Disease:   No acute diagnoses.    Endocrine:   DM - On ISS  Q4H FS    MSK  OOBTC      DVT PTX: HSQ    GI PTX: PPI 44y M PMH HTN s/p suspected stroke treated w/ tPA bolus 6/6/19, DM presenting s/p fall (sometime in past week) w grade 3 splenic laceration, hemoperitoneum, without active extravasation.    PLAN:   Neurologic: AO x 3  S/p suspected ischemic stroke 6/19 ? s/p tPA  No residual deficits, workup incomplete d/t pt AMA departure  F/u syncope w/u: EEG- neg, Carotid duplex pending   Pain control: Tylenol, oxy PRN     Respiratory:   Subacute nondisplaced L 10th fx  Satting well on RA, encourage IS    Cardiovascular:   HTN - Clevoprex gtt now off, restarted home amlodipine, Holding losartan 2/2 CANDICE  HLD- Home atorvastatin restarted  Echo - EF 71%, G1DD, mod LVH    Gastrointestinal/Nutrition:  Grade 3 splenic lac - No active extrav on CTA, no intervention per IR  Serial abdominal exams, Trend lactates, CBC q6h  4 pRBCs, 2 platelet on hold for possible OR  advanced to diet - made NPO again for possible intervention   GI ppx: PTX    Renal/Genitourinary:   CANDICE - Baseline Cr 1, current Cr 1.5 ->1.2  IVL, Voiding      Hematologic:   Hemoperitoneum on CT  Serial CBCs: 9.2--8.3>7.3 (1pRBC)> 8.4>8.4>7.5>7 (1pRBC) >  Trend hgb - 4 pRBCs, 2 plt on hold,   started HSQ - Now holding   7.2 s/p 1 unit pRBCs     Infectious Disease:   No acute diagnoses.    Endocrine:   DM - On ISS  Q4H FS    MSK  OOBTC      DVT PTX: HSQ    GI PTX: PPI

## 2019-08-12 NOTE — CHART NOTE - NSCHARTNOTEFT_GEN_A_CORE
54 y/o m p/w grade 3 splenic lac with CTA showing active extravasation from two areas of inferior splenic artery branch. Embolization performed using 3 cc Gelfoam slurry and one 4 mm x 10 cm coil. Post embolization angiogram demonstrated no further extravasation. Patient tolerated procedure well. Right femoral access site closed with Mynx closure device and manual compression.      1. Extensive splenic injury with enlarging parenchymal hematomas and active   arterial bleeding.   2. Increasing large volume hemoperitoneum.   3. Additional findings in body of report. 52 y/o m p/w grade 3 splenic lac with CTA showing active extravasation from two areas of inferior splenic artery branch. s/p IR embolization (  3 cc Gelfoam slurry and one 4 mm x 10 cm coil)  Post embolization angiogram demonstrated no further extravasation. Patient tolerated procedure well. Right femoral access site closed with Mynx closure device and manual compression.    Patient examined at bedside post procedure  - Will keep Right leg straight for 4 hours  - Right groin access site with mild induration on exam- IR resident aware and evaluated , recommend 52 y/o m p/w grade 3 splenic lac with CTA showing active extravasation from two areas of inferior splenic artery branch. s/p IR embolization (  3 cc Gelfoam slurry and one 4 mm x 10 cm coil)  Post embolization angiogram demonstrated no further extravasation. Patient tolerated procedure well. Right femoral access site closed with Mynx closure device and manual compression.    Patient examined at bedside post procedure  - Slightly hypertensive, tachycardic on exam- will treat with pain meds, bp meds if needed, continue to monitor  - Will keep Right leg straight for 4 hours  - Right groin access site with mild induration on exam- IR resident aware and evaluated , recommend to watch for now  - Repeat CBC @1pm, restart diet/hep sq if H/H stable 54 y/o m p/w grade 3 splenic lac with CTA showing active extravasation from two areas of inferior splenic artery branch. s/p IR embolization (  3 cc Gelfoam slurry and one 4 mm x 10 cm coil)  Post embolization angiogram demonstrated no further extravasation. Patient tolerated procedure well. Right femoral access site closed with Mynx closure device and manual compression. 1U PRBC intraop    Patient examined at bedside post procedure  - Slightly hypertensive, tachycardic on exam- will treat with pain meds, bp meds if needed, continue to monitor  - Will keep Right leg straight for 4 hours  - Right groin access site with mild induration on exam- IR resident aware and evaluated , recommend to watch for now  - Repeat CBC @1pm, restart diet/hep sq if H/H stable

## 2019-08-13 LAB
ANION GAP SERPL CALC-SCNC: 10 MMOL/L — SIGNIFICANT CHANGE UP (ref 7–14)
APTT BLD: 30.3 SEC — SIGNIFICANT CHANGE UP (ref 27–39.2)
BUN SERPL-MCNC: 14 MG/DL — SIGNIFICANT CHANGE UP (ref 10–20)
CALCIUM SERPL-MCNC: 8.8 MG/DL — SIGNIFICANT CHANGE UP (ref 8.5–10.1)
CHLORIDE SERPL-SCNC: 98 MMOL/L — SIGNIFICANT CHANGE UP (ref 98–110)
CO2 SERPL-SCNC: 28 MMOL/L — SIGNIFICANT CHANGE UP (ref 17–32)
CREAT SERPL-MCNC: 0.9 MG/DL — SIGNIFICANT CHANGE UP (ref 0.7–1.5)
GLUCOSE SERPL-MCNC: 114 MG/DL — HIGH (ref 70–99)
HCT VFR BLD CALC: 21.9 % — LOW (ref 42–52)
HCT VFR BLD CALC: 22.7 % — LOW (ref 42–52)
HCT VFR BLD CALC: 22.9 % — LOW (ref 42–52)
HGB BLD-MCNC: 7 G/DL — LOW (ref 14–18)
HGB BLD-MCNC: 7.1 G/DL — LOW (ref 14–18)
HGB BLD-MCNC: 7.2 G/DL — LOW (ref 14–18)
INR BLD: 1.38 RATIO — HIGH (ref 0.65–1.3)
MAGNESIUM SERPL-MCNC: 1.9 MG/DL — SIGNIFICANT CHANGE UP (ref 1.8–2.4)
MCHC RBC-ENTMCNC: 21.6 PG — LOW (ref 27–31)
MCHC RBC-ENTMCNC: 21.8 PG — LOW (ref 27–31)
MCHC RBC-ENTMCNC: 22 PG — LOW (ref 27–31)
MCHC RBC-ENTMCNC: 31.3 G/DL — LOW (ref 32–37)
MCHC RBC-ENTMCNC: 31.4 G/DL — LOW (ref 32–37)
MCHC RBC-ENTMCNC: 32 G/DL — SIGNIFICANT CHANGE UP (ref 32–37)
MCV RBC AUTO: 68.6 FL — LOW (ref 80–94)
MCV RBC AUTO: 68.9 FL — LOW (ref 80–94)
MCV RBC AUTO: 69.8 FL — LOW (ref 80–94)
NRBC # BLD: 0 /100 WBCS — SIGNIFICANT CHANGE UP (ref 0–0)
PHOSPHATE SERPL-MCNC: 2.9 MG/DL — SIGNIFICANT CHANGE UP (ref 2.1–4.9)
PLATELET # BLD AUTO: 138 K/UL — SIGNIFICANT CHANGE UP (ref 130–400)
PLATELET # BLD AUTO: 139 K/UL — SIGNIFICANT CHANGE UP (ref 130–400)
PLATELET # BLD AUTO: 160 K/UL — SIGNIFICANT CHANGE UP (ref 130–400)
POTASSIUM SERPL-MCNC: 4.3 MMOL/L — SIGNIFICANT CHANGE UP (ref 3.5–5)
POTASSIUM SERPL-SCNC: 4.3 MMOL/L — SIGNIFICANT CHANGE UP (ref 3.5–5)
PROTHROM AB SERPL-ACNC: 15.8 SEC — HIGH (ref 9.95–12.87)
RBC # BLD: 3.18 M/UL — LOW (ref 4.7–6.1)
RBC # BLD: 3.25 M/UL — LOW (ref 4.7–6.1)
RBC # BLD: 3.34 M/UL — LOW (ref 4.7–6.1)
RBC # FLD: 23.9 % — HIGH (ref 11.5–14.5)
RBC # FLD: 23.9 % — HIGH (ref 11.5–14.5)
RBC # FLD: 24 % — HIGH (ref 11.5–14.5)
SODIUM SERPL-SCNC: 136 MMOL/L — SIGNIFICANT CHANGE UP (ref 135–146)
WBC # BLD: 9.61 K/UL — SIGNIFICANT CHANGE UP (ref 4.8–10.8)
WBC # BLD: 9.87 K/UL — SIGNIFICANT CHANGE UP (ref 4.8–10.8)
WBC # BLD: 9.89 K/UL — SIGNIFICANT CHANGE UP (ref 4.8–10.8)
WBC # FLD AUTO: 9.61 K/UL — SIGNIFICANT CHANGE UP (ref 4.8–10.8)
WBC # FLD AUTO: 9.87 K/UL — SIGNIFICANT CHANGE UP (ref 4.8–10.8)
WBC # FLD AUTO: 9.89 K/UL — SIGNIFICANT CHANGE UP (ref 4.8–10.8)

## 2019-08-13 PROCEDURE — 99233 SBSQ HOSP IP/OBS HIGH 50: CPT

## 2019-08-13 PROCEDURE — 71045 X-RAY EXAM CHEST 1 VIEW: CPT | Mod: 26

## 2019-08-13 RX ORDER — LABETALOL HCL 100 MG
10 TABLET ORAL ONCE
Refills: 0 | Status: COMPLETED | OUTPATIENT
Start: 2019-08-13 | End: 2019-08-13

## 2019-08-13 RX ORDER — LABETALOL HCL 100 MG
100 TABLET ORAL EVERY 8 HOURS
Refills: 0 | Status: DISCONTINUED | OUTPATIENT
Start: 2019-08-13 | End: 2019-08-15

## 2019-08-13 RX ORDER — ONDANSETRON 8 MG/1
4 TABLET, FILM COATED ORAL EVERY 6 HOURS
Refills: 0 | Status: DISCONTINUED | OUTPATIENT
Start: 2019-08-13 | End: 2019-08-15

## 2019-08-13 RX ORDER — ASPIRIN/CALCIUM CARB/MAGNESIUM 324 MG
81 TABLET ORAL DAILY
Refills: 0 | Status: DISCONTINUED | OUTPATIENT
Start: 2019-08-13 | End: 2019-08-14

## 2019-08-13 RX ORDER — HYDROMORPHONE HYDROCHLORIDE 2 MG/ML
1 INJECTION INTRAMUSCULAR; INTRAVENOUS; SUBCUTANEOUS ONCE
Refills: 0 | Status: DISCONTINUED | OUTPATIENT
Start: 2019-08-13 | End: 2019-08-13

## 2019-08-13 RX ORDER — HYDROCHLOROTHIAZIDE 25 MG
25 TABLET ORAL DAILY
Refills: 0 | Status: DISCONTINUED | OUTPATIENT
Start: 2019-08-13 | End: 2019-08-15

## 2019-08-13 RX ORDER — HEPARIN SODIUM 5000 [USP'U]/ML
5000 INJECTION INTRAVENOUS; SUBCUTANEOUS EVERY 8 HOURS
Refills: 0 | Status: DISCONTINUED | OUTPATIENT
Start: 2019-08-13 | End: 2019-08-14

## 2019-08-13 RX ORDER — LOSARTAN POTASSIUM 100 MG/1
25 TABLET, FILM COATED ORAL DAILY
Refills: 0 | Status: DISCONTINUED | OUTPATIENT
Start: 2019-08-13 | End: 2019-08-15

## 2019-08-13 RX ADMIN — HEPARIN SODIUM 5000 UNIT(S): 5000 INJECTION INTRAVENOUS; SUBCUTANEOUS at 14:16

## 2019-08-13 RX ADMIN — SENNA PLUS 2 TABLET(S): 8.6 TABLET ORAL at 01:11

## 2019-08-13 RX ADMIN — HEPARIN SODIUM 5000 UNIT(S): 5000 INJECTION INTRAVENOUS; SUBCUTANEOUS at 22:12

## 2019-08-13 RX ADMIN — SENNA PLUS 2 TABLET(S): 8.6 TABLET ORAL at 22:12

## 2019-08-13 RX ADMIN — Medication 10 MILLIGRAM(S): at 03:05

## 2019-08-13 RX ADMIN — Medication 650 MILLIGRAM(S): at 01:12

## 2019-08-13 RX ADMIN — AMLODIPINE BESYLATE 2.5 MILLIGRAM(S): 2.5 TABLET ORAL at 05:26

## 2019-08-13 RX ADMIN — OXYCODONE HYDROCHLORIDE 10 MILLIGRAM(S): 5 TABLET ORAL at 20:20

## 2019-08-13 RX ADMIN — Medication 25 MILLIGRAM(S): at 16:26

## 2019-08-13 RX ADMIN — OXYCODONE HYDROCHLORIDE 10 MILLIGRAM(S): 5 TABLET ORAL at 21:16

## 2019-08-13 RX ADMIN — Medication 81 MILLIGRAM(S): at 16:25

## 2019-08-13 RX ADMIN — OXYCODONE HYDROCHLORIDE 10 MILLIGRAM(S): 5 TABLET ORAL at 05:42

## 2019-08-13 RX ADMIN — Medication 100 MILLIGRAM(S): at 22:12

## 2019-08-13 RX ADMIN — OXYCODONE HYDROCHLORIDE 10 MILLIGRAM(S): 5 TABLET ORAL at 06:12

## 2019-08-13 RX ADMIN — HYDROMORPHONE HYDROCHLORIDE 1 MILLIGRAM(S): 2 INJECTION INTRAMUSCULAR; INTRAVENOUS; SUBCUTANEOUS at 15:08

## 2019-08-13 RX ADMIN — HYDROMORPHONE HYDROCHLORIDE 1 MILLIGRAM(S): 2 INJECTION INTRAMUSCULAR; INTRAVENOUS; SUBCUTANEOUS at 15:12

## 2019-08-13 RX ADMIN — Medication 650 MILLIGRAM(S): at 05:26

## 2019-08-13 RX ADMIN — Medication 2.5 MILLIGRAM(S): at 18:47

## 2019-08-13 RX ADMIN — PANTOPRAZOLE SODIUM 40 MILLIGRAM(S): 20 TABLET, DELAYED RELEASE ORAL at 06:25

## 2019-08-13 RX ADMIN — Medication 2.5 MILLIGRAM(S): at 16:25

## 2019-08-13 RX ADMIN — ATORVASTATIN CALCIUM 80 MILLIGRAM(S): 80 TABLET, FILM COATED ORAL at 22:12

## 2019-08-13 RX ADMIN — CHLORHEXIDINE GLUCONATE 1 APPLICATION(S): 213 SOLUTION TOPICAL at 05:27

## 2019-08-13 RX ADMIN — Medication 650 MILLIGRAM(S): at 05:29

## 2019-08-13 RX ADMIN — Medication 10 MILLIGRAM(S): at 15:07

## 2019-08-13 RX ADMIN — ONDANSETRON 4 MILLIGRAM(S): 8 TABLET, FILM COATED ORAL at 22:59

## 2019-08-13 NOTE — CONSULT NOTE ADULT - SUBJECTIVE AND OBJECTIVE BOX
HPI:  44 y.o. male with history of hypertension and ischemic stroke 2 months ago, at which time he received tPa bolus and signed out AMA on hospital day #1, presents to ED today with 5 day history of worsening LLQ abdominal pain that is now diffuse.  He reports vomiting.  No fever, chills, diarrhea or constipation. (10 Aug 2019 15:57)      PAST MEDICAL & SURGICAL HISTORY:  Stroke: 3 weeks ago  HTN (hypertension)  No significant past surgical history      Hospital Course: s/p splenic artery embolization    TODAY'S SUBJECTIVE & REVIEW OF SYMPTOMS:     Constitutional WNL   Cardio WNL   Resp WNL   GI WNL  Heme WNL  Endo WNL  Skin WNL  MSK Weakness  Neuro WNL  Cognitive WNL  Psych WNL      MEDICATIONS  (STANDING):  acetaminophen   Tablet .. 650 milliGRAM(s) Oral every 6 hours  amLODIPine   Tablet 2.5 milliGRAM(s) Oral daily  aspirin  chewable 81 milliGRAM(s) Oral daily  atorvastatin 80 milliGRAM(s) Oral at bedtime  chlorhexidine 4% Liquid 1 Application(s) Topical daily  dextrose 50% Injectable 25 Gram(s) IV Push once  enalapril 2.5 milliGRAM(s) Oral once  heparin  Injectable 5000 Unit(s) SubCutaneous every 8 hours  hydrochlorothiazide 25 milliGRAM(s) Oral daily  losartan 25 milliGRAM(s) Oral daily  pantoprazole    Tablet 40 milliGRAM(s) Oral before breakfast  senna 2 Tablet(s) Oral at bedtime    MEDICATIONS  (PRN):  glucagon  Injectable 1 milliGRAM(s) IntraMuscular once PRN Glucose LESS THAN 70 milligrams/deciliter  oxyCODONE    IR 5 milliGRAM(s) Oral every 6 hours PRN Moderate Pain (4 - 6)  oxyCODONE    IR 10 milliGRAM(s) Oral every 6 hours PRN Severe Pain (7 - 10)      FAMILY HISTORY:  FH: type 2 diabetes      Allergies    No Known Allergies    Intolerances        SOCIAL HISTORY:    [  ] Etoh  [  ] Smoking  [  ] Substance abuse     Home Environment:  [  ] Home Alone  [ x ] Lives with Family  [  ] Home Health Aid    Dwelling:  [  ] Apartment  [x  ] Private House  [  ] Adult Home  [  ] Skilled Nursing Facility      [  ] Short Term  [  ] Long Term  [x  ] Stairs       Elevator [  ]    FUNCTIONAL STATUS PTA: (Check all that apply)  Ambulation: [ x  ]Independent    [  ] Dependent     [  ] Non-Ambulatory  Assistive Device: [  ] SA Cane  [  ]  Q Cane  [  ] Walker  [  ]  Wheelchair  ADL : [x  ] Independent  [  ]  Dependent       Vital Signs Last 24 Hrs  T(C): 36.3 (13 Aug 2019 12:00), Max: 36.7 (13 Aug 2019 00:00)  T(F): 97.4 (13 Aug 2019 12:00), Max: 98 (13 Aug 2019 00:00)  HR: 90 (13 Aug 2019 15:00) (76 - 100)  BP: 171/94 (13 Aug 2019 15:00) (140/75 - 197/109)  BP(mean): 121 (13 Aug 2019 15:00) (97 - 150)  RR: 17 (13 Aug 2019 15:00) (10 - 21)  SpO2: 98% (13 Aug 2019 15:00) (94% - 100%)      PHYSICAL EXAM: Alert & Oriented X3  GENERAL: NAD, well-groomed, well-developed  HEAD:  Atraumatic, Normocephalic  CHEST/LUNG: Clear   HEART: S1S2+  ABDOMEN: Soft, Nontender  EXTREMITIES:  no calf tenderness    NERVOUS SYSTEM:  Cranial Nerves 2-12 intact [  ] Abnormal  [  ]  ROM: WFL all extremities [x  ]  Abnormal [  ]  Motor Strength: WFL all extremities  [ x ]  Abnormal [  ]  Sensation: intact to light touch [x  ] Abnormal [  ]  Reflexes: Symmetric [  ]  Abnormal [  ]    FUNCTIONAL STATUS:  Bed Mobility: Independent [  ]  Supervision [  ]  Needs Assistance [ x ]  N/A [  ]  Transfers: Independent [  ]  Supervision [  ]  Needs Assistance [ x ]  N/A [  ]   Ambulation: Independent [  ]  Supervision [  ]  Needs Assistance [  ]  N/A [  ]  ADL: Independent [  ] Requires Assistance [  ] N/A [  ]      LABS:                        7.2    9.89  )-----------( 160      ( 13 Aug 2019 12:27 )             22.9     08-13    136  |  98  |  14  ----------------------------<  114<H>  4.3   |  28  |  0.9    Ca    8.8      13 Aug 2019 00:33  Phos  2.9     08-13  Mg     1.9     08-13      PT/INR - ( 13 Aug 2019 00:33 )   PT: 15.80 sec;   INR: 1.38 ratio         PTT - ( 13 Aug 2019 00:33 )  PTT:30.3 sec      RADIOLOGY & ADDITIONAL STUDIES:    Assesment:

## 2019-08-13 NOTE — CHART NOTE - NSCHARTNOTEFT_GEN_A_CORE
45 y/o male presented on 8/10 with findings of grade 3 splenic lac ( + extravasation from inferior splenic artery) and left non displaced 10th rib fx. He is currently POD 1 s/p IR embolization and coiling of splenic artery with 3 consecutive stable H/H. He remains HDS     Hx of HTN, Ischemic stroke    Neurologic:   - Neurologically intact     Syncope workup: 2/2 patient consistent falls and inability to recall falls  - Echo stable EF 71%, no AS, G1DD  - Carotid duplex 40-59% stenosis on right, 20-39% stenosis on left  - EEG negative    Previous ischemic stroke:  - ASA    Pain control:   - Tylenol, Oxy      Respiratory:   Subacute nondisplaced L 10th fx:  - Pain controlled  - CXR stable  - Good oxygen saturation on RA  - Encourage IS, PT    Cardiovascular:   HTN/HLD : home amlodipine, losartan, HCTZ,  statin restarted     Gastrointestinal/Nutrition:  Grade 3 splenic lac s/p embolization  - Serial abdominal exams: on my exams, diffuse TTP on LUQ, LLQ resolves with pain meds  - Embolization access via right femoral groin, mynx dressing in place, mild induration appreciated on top of dressing, remains stable  - H/H x4 stable    Diet:  - Regular    Prophylaxis:  - PPI/Senna    Renal/Genitourinary:   CANDICE:   - Resolving   - Voiding    Hypomag/Hypophos:  - Repleted     Hematologic:   Grade 3 splenic lac s/p embolization:  - H/H stable x4  - HDS     Prophylaxis:  - Hep SQ  - SCD    Infectious Disease:   -No acute diagnosis     Endocrine:   - No issues    MSK  - OOBTC    DVT PTX: HSQ    GI PTX: PPI    Signed out to Maxi PGY1 @ 13:46 43 y/o male presented on 8/10 with findings of grade 3 splenic lac ( + extravasation from inferior splenic artery) and left non displaced 10th rib fx. He is currently POD 1 s/p IR embolization and coiling of splenic artery with 3 consecutive stable H/H. He remains HDS     Hx of HTN, Ischemic stroke    Neurologic:   - Neurologically intact     Syncope workup: 2/2 patient consistent falls and inability to recall falls  - Echo stable EF 71%, no AS, G1DD  - Carotid duplex 40-59% stenosis on right, 20-39% stenosis on left  - EEG negative    Previous ischemic stroke:  - ASA    Pain control:   - Tylenol, Oxy      Respiratory:   Subacute nondisplaced L 10th fx:  - Pain controlled  - CXR stable  - Good oxygen saturation on RA  - Encourage IS, PT    Cardiovascular:   HTN/HLD : home amlodipine, losartan, HCTZ,  statin restarted     Gastrointestinal/Nutrition:  Grade 3 splenic lac s/p embolization  - Serial abdominal exams: on my exams, diffuse TTP on LUQ, LLQ resolves with pain meds  - Embolization access via right femoral groin, mynx dressing in place, mild induration appreciated on top of dressing, remains stable  - H/H x4 stable    Diet:  - Regular    Prophylaxis:  - PPI/Senna    Renal/Genitourinary:   CANDICE:   - Resolving   - Voiding    Hypomag/Hypophos:  - Repleted     Hematologic: total 3U PRBC   Grade 3 splenic lac s/p embolization:  - H/H stable x4  - HDS     Prophylaxis:  - Hep SQ  - SCD    Infectious Disease:   -No acute diagnosis     Endocrine:   - No issues    MSK  - OOBTC    DVT PTX: HSQ    GI PTX: PPI    Signed out to Maxi PGY1 @ 13:46

## 2019-08-13 NOTE — CONSULT NOTE ADULT - ASSESSMENT

## 2019-08-13 NOTE — PROGRESS NOTE ADULT - SUBJECTIVE AND OBJECTIVE BOX
SUNIL VILLALOBOS  [id number]  44y Male    Indication for ICU admission:    Admit Date:08-10-19  ICU Date: 8/10/19  OR Date: N/A    No Known Allergies    PAST MEDICAL & SURGICAL HISTORY:  Stroke: 3 weeks ago  HTN (hypertension)    Home Medications:  amlodipine 2.5mg QD  losartan 25 mg QD  ASA 81mg QD  lipitor 80mg QD      24HRS EVENT:  Overnight: Hgb 7.1>7.0    Neurologic: AO x 3  S/p suspected ischemic stroke 6/19 ? s/p tPA  No residual deficits, workup incomplete d/t pt AMA departure  F/u syncope w/u: EEG- neg, Carotid duplex pending   Pain control: Tylenol, oxy PRN     Respiratory:   Subacute nondisplaced L 10th fx  Satting well on RA, encourage IS    Cardiovascular:   HTN - Clevoprex gtt now off, restarted home amlodipine, Holding losartan 2/2 CANDICE  HLD- Home atorvastatin restarted  Echo - EF 71%, G1DD, mod LVH    Gastrointestinal/Nutrition:  Grade 3 splenic lac - No active extrav on CTA  Got CTA embolization of inferior splenic artery branches, right femoral access  Serial abdominal exams, Trend lactates, CBC q6h  4 pRBCs, 2 platelet on hold for possible OR  advanced to diet - Now NPO  GI ppx: PTX  Carotid duplex 40-59% stenosis on right, 20-39% stenosis on left    Renal/Genitourinary:   CANDICE - Baseline Cr 1, current Cr 1.5 ->1.2  IVL, Voiding      Hematologic:   Hemoperitoneum on CT  Serial CBCs: 9.2--8.3>7.3 (1pRBC)> 8.4>8.4>7.5>7 (1pRBC) > 7.1>7.0  Trend hgb - 4 pRBCs, 2 plt on hold,   started HSQ - now d/c'd    Infectious Disease:   No acute diagnoses.    Endocrine:   HbA1C @ 4.8  DM - On ISS  Q4H FS    MSK  OOBTC      DVT PTX: HSQ    GI PTX: PPI    ***Tubes/Lines/Drains  ***  Peripheral IV      REVIEW OF SYSTEMS    [x ] A ten-point review of systems was otherwise negative except as noted.  [ ] Due to altered mental status/intubation, subjective information were not able to be obtained from the patient. History was obtained, to the extent possible, from review of the chart and collateral sources of information. SUNIL VILLALOBOS  [id number]  44y Male    Indication for ICU admission:    Admit Date:08-10-19  ICU Date: 8/10/19  OR Date: N/A    No Known Allergies    PAST MEDICAL & SURGICAL HISTORY:  Stroke: 3 weeks ago  HTN (hypertension)    Home Medications:  amlodipine 2.5mg QD  losartan 25 mg QD  ASA 81mg QD  lipitor 80mg QD      24HRS EVENT:  Overnight: Hgb 7.1>7.0    Neurologic: AO x 3  S/p suspected ischemic stroke  ? s/p tPA  No residual deficits, workup incomplete d/t pt AMA departure  F/u syncope w/u: EEG- neg, Carotid duplex pending   Pain control: Tylenol, oxy PRN     Respiratory:   Subacute nondisplaced L 10th fx  Satting well on RA, encourage IS    Cardiovascular:   HTN - Clevoprex gtt now off, restarted home amlodipine, Holding losartan 2/2 CANDICE  HLD- Home atorvastatin restarted  Echo - EF 71%, G1DD, mod LVH    Gastrointestinal/Nutrition:  Grade 3 splenic lac - No active extrav on CTA  Got CTA embolization of inferior splenic artery branches, right femoral access  Serial abdominal exams, Trend lactates, CBC q6h  4 pRBCs, 2 platelet on hold for possible OR  advanced to diet - Now NPO  GI ppx: PTX  Carotid duplex 40-59% stenosis on right, 20-39% stenosis on left    Renal/Genitourinary:   CANDICE - Baseline Cr 1, current Cr 1.5 ->1.2  IVL, Voiding      Hematologic:   Hemoperitoneum on CT  Serial CBCs: 9.2--8.3>7.3 (1pRBC)> 8.4>8.4>7.5>7 (1pRBC) > 7.1>7.0  Trend hgb - 4 pRBCs, 2 plt on hold,   started HSQ - now d/c'd    Infectious Disease:   No acute diagnoses.    Endocrine:   HbA1C @ 4.8  DM - On ISS  Q4H FS    MSK  OOBTC      DVT PTX: HSQ    GI PTX: PPI    ***Tubes/Lines/Drains  ***  Peripheral IV      REVIEW OF SYSTEMS    [x ] A ten-point review of systems was otherwise negative except as noted.  [ ] Due to altered mental status/intubation, subjective information were not able to be obtained from the patient. History was obtained, to the extent possible, from review of the chart and collateral sources of information.    Daily     Daily Weight in k.6 (13 Aug 2019 04:00)    Diet, NPO (19 @ 06:37)      CURRENT MEDS:  Neurologic Medications  acetaminophen   Tablet .. 650 milliGRAM(s) Oral every 6 hours  oxyCODONE    IR 5 milliGRAM(s) Oral every 6 hours PRN Moderate Pain (4 - 6)  oxyCODONE    IR 10 milliGRAM(s) Oral every 6 hours PRN Severe Pain (7 - 10)    Respiratory Medications    Cardiovascular Medications  amLODIPine   Tablet 2.5 milliGRAM(s) Oral daily    Gastrointestinal Medications  dextrose 5%. 1000 milliLiter(s) IV Continuous <Continuous>  lactated ringers. 1000 milliLiter(s) IV Continuous <Continuous>  pantoprazole    Tablet 40 milliGRAM(s) Oral before breakfast  senna 2 Tablet(s) Oral at bedtime    Genitourinary Medications    Hematologic/Oncologic Medications    Antimicrobial/Immunologic Medications    Endocrine/Metabolic Medications  atorvastatin 80 milliGRAM(s) Oral at bedtime  dextrose 40% Gel 15 Gram(s) Oral once PRN Blood Glucose LESS THAN 70 milliGRAM(s)/deciliter  dextrose 50% Injectable 12.5 Gram(s) IV Push once  dextrose 50% Injectable 25 Gram(s) IV Push once  dextrose 50% Injectable 25 Gram(s) IV Push once  glucagon  Injectable 1 milliGRAM(s) IntraMuscular once PRN Glucose LESS THAN 70 milligrams/deciliter  insulin lispro (HumaLOG) corrective regimen sliding scale   SubCutaneous every 6 hours    Topical/Other Medications  chlorhexidine 4% Liquid 1 Application(s) Topical daily      ICU Vital Signs Last 24 Hrs  T(C): 36.4 (13 Aug 2019 04:00), Max: 37.4 (12 Aug 2019 12:00)  T(F): 97.6 (13 Aug 2019 04:00), Max: 99.3 (12 Aug 2019 12:00)  HR: 88 (13 Aug 2019 07:00) (78 - 100)  BP: 140/75 (13 Aug 2019 07:00) (140/75 - 197/99)  BP(mean): 97 (13 Aug 2019 07:00) (97 - 144)  ABP: --  ABP(mean): --  RR: 10 (13 Aug 2019 07:00) (10 - 21)  SpO2: 97% (13 Aug 2019 07:00) (94% - 100%)    I&O's Summary    12 Aug 2019 07:01  -  13 Aug 2019 07:00  --------------------------------------------------------  IN: 2200 mL / OUT: 1050 mL / NET: 1150 mL      I&O's Detail    12 Aug 2019 07:  -  13 Aug 2019 07:00  --------------------------------------------------------  IN:    lactated ringers.: 2200 mL  Total IN: 2200 mL    OUT:    Voided: 1050 mL  Total OUT: 1050 mL    Total NET: 1150 mL          PHYSICAL EXAM:    General/Neuro  RASS:             GCS:     = E   / V   / M      Deficits:                             alert & oriented x 3, no focal deficits  Pupils:    Lungs:      clear to auscultation, Normal expansion/effort.     Cardiovascular : S1, S2.  Regular rate and rhythm.  Peripheral edema   Cardiac Rhythm: Normal Sinus Rhythm    GI: Abdomen soft, Non-tender, Non-distended.    Gastrostomy / Jejunostomy tube in place.  Nasogastric tube in place.  Colostomy / Ileostomy.    Wound:    Extremities: Extremities warm, pink, well-perfused. Pulses:Rt     Lt    Derm: Good skin turgor, no skin breakdown.      :       Waters catheter in place.      CXR:     LABS:  CAPILLARY BLOOD GLUCOSE      POCT Blood Glucose.: 117 mg/dL (12 Aug 2019 17:23)  POCT Blood Glucose.: 111 mg/dL (12 Aug 2019 12:14)  POCT Blood Glucose.: 89 mg/dL (12 Aug 2019 08:59)                          7.0    9.87  )-----------( 139      ( 13 Aug 2019 04:31 )             21.9       08-13    136  |  98  |  14  ----------------------------<  114<H>  4.3   |  28  |  0.9    Ca    8.8      13 Aug 2019 00:33  Phos  2.9     -  Mg     1.9     08-13        PT/INR - ( 13 Aug 2019 00:33 )   PT: 15.80 sec;   INR: 1.38 ratio         PTT - ( 13 Aug 2019 00:33 )  PTT:30.3 sec SUNIL VILLALOBOS  [id number]  44y Male    Indication for ICU admission:    Admit Date:08-10-19  ICU Date: 8/10/19  OR Date: N/A    No Known Allergies    PAST MEDICAL & SURGICAL HISTORY:  Stroke: 3 weeks ago  HTN (hypertension)    Home Medications:  amlodipine 2.5mg QD  losartan 25 mg QD  ASA 81mg QD  lipitor 80mg QD      24HRS EVENT:  Overnight: Hgb 7.1>7.0    Neurologic: AO x 3  S/p suspected ischemic stroke  ? s/p tPA  No residual deficits, workup incomplete d/t pt AMA departure  F/u syncope w/u: EEG- neg, Carotid duplex pending   Pain control: Tylenol, oxy PRN     Respiratory:   Subacute nondisplaced L 10th fx  Satting well on RA, encourage IS    Cardiovascular:   HTN - Clevoprex gtt now off, restarted home amlodipine, Holding losartan 2/2 CANDICE  HLD- Home atorvastatin restarted  Echo - EF 71%, G1DD, mod LVH    Gastrointestinal/Nutrition:  Grade 3 splenic lac - No active extrav on CTA  Got CTA embolization of inferior splenic artery branches, right femoral access  Serial abdominal exams, Trend lactates, CBC q6h  4 pRBCs, 2 platelet on hold for possible OR  advanced to diet - Now NPO  GI ppx: PTX  Carotid duplex 40-59% stenosis on right, 20-39% stenosis on left    Renal/Genitourinary:   CANDICE - Baseline Cr 1, current Cr 1.5 ->1.2  IVL, Voiding      Hematologic:   Hemoperitoneum on CT  Serial CBCs: 9.2--8.3>7.3 (1pRBC)> 8.4>8.4>7.5>7 (1pRBC) > 7.1>7.0  Trend hgb - 4 pRBCs, 2 plt on hold,   started HSQ - now d/c'd    Infectious Disease:   No acute diagnoses.    Endocrine:   HbA1C @ 4.8  DM - On ISS  Q4H FS    MSK  OOBTC      DVT PTX: HSQ    GI PTX: PPI    ***Tubes/Lines/Drains  ***  Peripheral IV      REVIEW OF SYSTEMS    [x ] A ten-point review of systems was otherwise negative except as noted.  [ ] Due to altered mental status/intubation, subjective information were not able to be obtained from the patient. History was obtained, to the extent possible, from review of the chart and collateral sources of information.    Daily     Daily Weight in k.6 (13 Aug 2019 04:00)    Diet, NPO (19 @ 06:37)      CURRENT MEDS:  Neurologic Medications  acetaminophen   Tablet .. 650 milliGRAM(s) Oral every 6 hours  oxyCODONE    IR 5 milliGRAM(s) Oral every 6 hours PRN Moderate Pain (4 - 6)  oxyCODONE    IR 10 milliGRAM(s) Oral every 6 hours PRN Severe Pain (7 - 10)    Respiratory Medications    Cardiovascular Medications  amLODIPine   Tablet 2.5 milliGRAM(s) Oral daily    Gastrointestinal Medications  dextrose 5%. 1000 milliLiter(s) IV Continuous <Continuous>  lactated ringers. 1000 milliLiter(s) IV Continuous <Continuous>  pantoprazole    Tablet 40 milliGRAM(s) Oral before breakfast  senna 2 Tablet(s) Oral at bedtime    Genitourinary Medications    Hematologic/Oncologic Medications    Antimicrobial/Immunologic Medications    Endocrine/Metabolic Medications  atorvastatin 80 milliGRAM(s) Oral at bedtime  dextrose 40% Gel 15 Gram(s) Oral once PRN Blood Glucose LESS THAN 70 milliGRAM(s)/deciliter  dextrose 50% Injectable 12.5 Gram(s) IV Push once  dextrose 50% Injectable 25 Gram(s) IV Push once  dextrose 50% Injectable 25 Gram(s) IV Push once  glucagon  Injectable 1 milliGRAM(s) IntraMuscular once PRN Glucose LESS THAN 70 milligrams/deciliter  insulin lispro (HumaLOG) corrective regimen sliding scale   SubCutaneous every 6 hours    Topical/Other Medications  chlorhexidine 4% Liquid 1 Application(s) Topical daily      ICU Vital Signs Last 24 Hrs  T(C): 36.4 (13 Aug 2019 04:00), Max: 37.4 (12 Aug 2019 12:00)  T(F): 97.6 (13 Aug 2019 04:00), Max: 99.3 (12 Aug 2019 12:00)  HR: 88 (13 Aug 2019 07:00) (78 - 100)  BP: 140/75 (13 Aug 2019 07:00) (140/75 - 197/99)  BP(mean): 97 (13 Aug 2019 07:00) (97 - 144)  ABP: --  ABP(mean): --  RR: 10 (13 Aug 2019 07:00) (10 - 21)  SpO2: 97% (13 Aug 2019 07:00) (94% - 100%)    I&O's Summary    12 Aug 2019 07:01  -  13 Aug 2019 07:00  --------------------------------------------------------  IN: 2200 mL / OUT: 1050 mL / NET: 1150 mL      I&O's Detail    12 Aug 2019 07:  -  13 Aug 2019 07:00  --------------------------------------------------------  IN:    lactated ringers.: 2200 mL  Total IN: 2200 mL    OUT:    Voided: 1050 mL  Total OUT: 1050 mL    Total NET: 1150 mL          PHYSICAL EXAM:    General/Neuro  alert oriented:    Lungs:      clear to auscultation, Normal expansion/effort.     Cardiovascular : S1, S2.  Regular rate and rhythm.  Peripheral edema   Cardiac Rhythm: Normal Sinus Rhythm    GI: Abdomen soft, TTP on left upper and lower abdomen    Extremities: Extremities warm, pink, well-perfused. t    Derm: Good skin turgor, no skin breakdown.          CXR:     LABS:  CAPILLARY BLOOD GLUCOSE      POCT Blood Glucose.: 117 mg/dL (12 Aug 2019 17:23)  POCT Blood Glucose.: 111 mg/dL (12 Aug 2019 12:14)  POCT Blood Glucose.: 89 mg/dL (12 Aug 2019 08:59)                          7.0    9.87  )-----------( 139      ( 13 Aug 2019 04:31 )             21.9       08-13    136  |  98  |  14  ----------------------------<  114<H>  4.3   |  28  |  0.9    Ca    8.8      13 Aug 2019 00:33  Phos  2.9     08-13  Mg     1.9     08-13        PT/INR - ( 13 Aug 2019 00:33 )   PT: 15.80 sec;   INR: 1.38 ratio         PTT - ( 13 Aug 2019 00:33 )  PTT:30.3 sec SUNIL VILLALOBOS  [id number]  44y Male    Indication for ICU admission:    Admit Date:08-10-19  ICU Date: 8/10/19  OR Date: N/A    No Known Allergies    PAST MEDICAL & SURGICAL HISTORY:  Stroke: 3 weeks ago  HTN (hypertension)    Home Medications:  amlodipine 2.5mg QD  losartan 25 mg QD  ASA 81mg QD  lipitor 80mg QD      24HRS EVENT:  Overnight: Hgb 7.1>7.0    Neurologic: AO x 3  S/p suspected ischemic stroke  ? s/p tPA  No residual deficits, workup incomplete d/t pt AMA departure  F/u syncope w/u: EEG- neg, Carotid duplex pending   Pain control: Tylenol, oxy PRN     Respiratory:   Subacute nondisplaced L 10th fx  Satting well on RA, encourage IS    Cardiovascular:   HTN - Clevoprex gtt now off, restarted home amlodipine, Holding losartan 2/2 CANDICE  HLD- Home atorvastatin restarted  Echo - EF 71%, G1DD, mod LVH    Gastrointestinal/Nutrition:  Grade 3 splenic lac - No active extrav on CTA  Got CTA embolization of inferior splenic artery branches, right femoral access  Serial abdominal exams, Trend lactates, CBC q6h  4 pRBCs, 2 platelet on hold for possible OR  advanced to diet - Now NPO  GI ppx: PTX  Carotid duplex 40-59% stenosis on right, 20-39% stenosis on left    Renal/Genitourinary:   CANDICE - Baseline Cr 1, current Cr 1.5 ->1.2  IVL, Voiding      Hematologic:   Hemoperitoneum on CT  Serial CBCs: 9.2--8.3>7.3 (1pRBC)> 8.4>8.4>7.5>7 (1pRBC) > 7.1>7.0  Trend hgb - 4 pRBCs, 2 plt on hold,   started HSQ - now d/c'd    Infectious Disease:   No acute diagnoses.    Endocrine:   HbA1C @ 4.8  DM - On ISS  Q4H FS    MSK  OOBTC      DVT PTX: HSQ    GI PTX: PPI    ***Tubes/Lines/Drains  ***  Peripheral IV      REVIEW OF SYSTEMS    [x ] A ten-point review of systems was otherwise negative except as noted.  [ ] Due to altered mental status/intubation, subjective information were not able to be obtained from the patient. History was obtained, to the extent possible, from review of the chart and collateral sources of information.    Daily     Daily Weight in k.6 (13 Aug 2019 04:00)    Diet, NPO (19 @ 06:37)      CURRENT MEDS:  Neurologic Medications  acetaminophen   Tablet .. 650 milliGRAM(s) Oral every 6 hours  oxyCODONE    IR 5 milliGRAM(s) Oral every 6 hours PRN Moderate Pain (4 - 6)  oxyCODONE    IR 10 milliGRAM(s) Oral every 6 hours PRN Severe Pain (7 - 10)    Respiratory Medications    Cardiovascular Medications  amLODIPine   Tablet 2.5 milliGRAM(s) Oral daily    Gastrointestinal Medications  dextrose 5%. 1000 milliLiter(s) IV Continuous <Continuous>  lactated ringers. 1000 milliLiter(s) IV Continuous <Continuous>  pantoprazole    Tablet 40 milliGRAM(s) Oral before breakfast  senna 2 Tablet(s) Oral at bedtime    Genitourinary Medications    Hematologic/Oncologic Medications    Antimicrobial/Immunologic Medications    Endocrine/Metabolic Medications  atorvastatin 80 milliGRAM(s) Oral at bedtime  dextrose 40% Gel 15 Gram(s) Oral once PRN Blood Glucose LESS THAN 70 milliGRAM(s)/deciliter  dextrose 50% Injectable 12.5 Gram(s) IV Push once  dextrose 50% Injectable 25 Gram(s) IV Push once  dextrose 50% Injectable 25 Gram(s) IV Push once  glucagon  Injectable 1 milliGRAM(s) IntraMuscular once PRN Glucose LESS THAN 70 milligrams/deciliter  insulin lispro (HumaLOG) corrective regimen sliding scale   SubCutaneous every 6 hours    Topical/Other Medications  chlorhexidine 4% Liquid 1 Application(s) Topical daily      ICU Vital Signs Last 24 Hrs  T(C): 36.4 (13 Aug 2019 04:00), Max: 37.4 (12 Aug 2019 12:00)  T(F): 97.6 (13 Aug 2019 04:00), Max: 99.3 (12 Aug 2019 12:00)  HR: 88 (13 Aug 2019 07:00) (78 - 100)  BP: 140/75 (13 Aug 2019 07:00) (140/75 - 197/99)  BP(mean): 97 (13 Aug 2019 07:00) (97 - 144)  ABP: --  ABP(mean): --  RR: 10 (13 Aug 2019 07:00) (10 - 21)  SpO2: 97% (13 Aug 2019 07:00) (94% - 100%)    I&O's Summary    12 Aug 2019 07:  -  13 Aug 2019 07:00  --------------------------------------------------------  IN: 2200 mL / OUT: 1050 mL / NET: 1150 mL      I&O's Detail    12 Aug 2019 07:  -  13 Aug 2019 07:00  --------------------------------------------------------  IN:    lactated ringers.: 2200 mL  Total IN: 2200 mL    OUT:    Voided: 1050 mL  Total OUT: 1050 mL    Total NET: 1150 mL          PHYSICAL EXAM:    General/Neuro  alert oriented:    Lungs:      clear to auscultation, Normal expansion/effort.     Cardiovascular : S1, S2.  Regular rate and rhythm.  Peripheral edema   Cardiac Rhythm: Normal Sinus Rhythm    GI: Abdomen soft, TTP on left upper and lower abdomen, groin site c/d/i mild induration    Extremities: Extremities warm, pink, well-perfused. t    Derm: Good skin turgor, no skin breakdown.          CXR:     LABS:  CAPILLARY BLOOD GLUCOSE      POCT Blood Glucose.: 117 mg/dL (12 Aug 2019 17:23)  POCT Blood Glucose.: 111 mg/dL (12 Aug 2019 12:14)  POCT Blood Glucose.: 89 mg/dL (12 Aug 2019 08:59)                          7.0    9.87  )-----------( 139      ( 13 Aug 2019 04:31 )             21.9       08-13    136  |  98  |  14  ----------------------------<  114<H>  4.3   |  28  |  0.9    Ca    8.8      13 Aug 2019 00:33  Phos  2.9     08-13  Mg     1.9     08-13        PT/INR - ( 13 Aug 2019 00:33 )   PT: 15.80 sec;   INR: 1.38 ratio         PTT - ( 13 Aug 2019 00:33 )  PTT:30.3 sec

## 2019-08-13 NOTE — PROGRESS NOTE ADULT - ASSESSMENT
44y M PMH HTN s/p suspected stroke treated w/ tPA bolus 6/6/19, DM presenting s/p fall (sometime in past week) w grade 3 splenic laceration, hemoperitoneum, without active extravasation..    Neurologic: AO x 3  S/p suspected ischemic stroke 6/19 ? s/p tPA  No residual deficits, workup incomplete d/t pt AMA departure  F/u syncope w/u: EEG- neg, Carotid duplex pending   Pain control: Tylenol, oxy PRN     Respiratory:   Subacute nondisplaced L 10th fx  Satting well on RA, encourage IS    Cardiovascular:   HTN - Clevoprex gtt now off, restarted home amlodipine, Holding losartan 2/2 CANDICE  HLD- Home atorvastatin restarted  Echo - EF 71%, G1DD, mod LVH    Gastrointestinal/Nutrition:  Grade 3 splenic lac - No active extrav on CTA  Got CTA embolization of inferior splenic artery branches, right femoral access  Serial abdominal exams, Trend lactates, CBC q6h  4 pRBCs, 2 platelet on hold for possible OR  advanced to diet - Now NPO  GI ppx: PTX  Carotid duplex 40-59% stenosis on right, 20-39% stenosis on left    Renal/Genitourinary:   CANDICE - Baseline Cr 1, current Cr 1.5 ->1.2  IVL, Voiding      Hematologic:   Hemoperitoneum on CT  Serial CBCs: 9.2--8.3>7.3 (1pRBC)> 8.4>8.4>7.5>7 (1pRBC) > 7.1>7.0  Trend hgb - 4 pRBCs, 2 plt on hold,   started HSQ - now d/c'd    Infectious Disease:   No acute diagnoses.    Endocrine:   HbA1C @ 4.8  DM - On ISS  Q4H FS    MSK  OOBTC      DVT PTX: HSQ    GI PTX: PPI

## 2019-08-14 LAB
ANION GAP SERPL CALC-SCNC: 10 MMOL/L — SIGNIFICANT CHANGE UP (ref 7–14)
ANION GAP SERPL CALC-SCNC: 11 MMOL/L — SIGNIFICANT CHANGE UP (ref 7–14)
APTT BLD: 30.4 SEC — SIGNIFICANT CHANGE UP (ref 27–39.2)
BLD GP AB SCN SERPL QL: SIGNIFICANT CHANGE UP
BUN SERPL-MCNC: 8 MG/DL — LOW (ref 10–20)
BUN SERPL-MCNC: 9 MG/DL — LOW (ref 10–20)
CALCIUM SERPL-MCNC: 8.8 MG/DL — SIGNIFICANT CHANGE UP (ref 8.5–10.1)
CALCIUM SERPL-MCNC: 9 MG/DL — SIGNIFICANT CHANGE UP (ref 8.5–10.1)
CHLORIDE SERPL-SCNC: 95 MMOL/L — LOW (ref 98–110)
CHLORIDE SERPL-SCNC: 95 MMOL/L — LOW (ref 98–110)
CO2 SERPL-SCNC: 31 MMOL/L — SIGNIFICANT CHANGE UP (ref 17–32)
CO2 SERPL-SCNC: 31 MMOL/L — SIGNIFICANT CHANGE UP (ref 17–32)
CREAT SERPL-MCNC: 1 MG/DL — SIGNIFICANT CHANGE UP (ref 0.7–1.5)
CREAT SERPL-MCNC: 1 MG/DL — SIGNIFICANT CHANGE UP (ref 0.7–1.5)
GLUCOSE BLDC GLUCOMTR-MCNC: 134 MG/DL — HIGH (ref 70–99)
GLUCOSE SERPL-MCNC: 123 MG/DL — HIGH (ref 70–99)
GLUCOSE SERPL-MCNC: 130 MG/DL — HIGH (ref 70–99)
HCT VFR BLD CALC: 22 % — LOW (ref 42–52)
HCT VFR BLD CALC: 22.3 % — LOW (ref 42–52)
HCT VFR BLD CALC: 24.5 % — LOW (ref 42–52)
HCT VFR BLD CALC: 24.8 % — LOW (ref 42–52)
HCT VFR BLD CALC: 26.3 % — LOW (ref 42–52)
HGB BLD-MCNC: 6.8 G/DL — CRITICAL LOW (ref 14–18)
HGB BLD-MCNC: 7.1 G/DL — LOW (ref 14–18)
HGB BLD-MCNC: 7.9 G/DL — LOW (ref 14–18)
HGB BLD-MCNC: 7.9 G/DL — LOW (ref 14–18)
HGB BLD-MCNC: 8.4 G/DL — LOW (ref 14–18)
INR BLD: 1.34 RATIO — HIGH (ref 0.65–1.3)
MAGNESIUM SERPL-MCNC: 1.9 MG/DL — SIGNIFICANT CHANGE UP (ref 1.8–2.4)
MAGNESIUM SERPL-MCNC: 2.2 MG/DL — SIGNIFICANT CHANGE UP (ref 1.8–2.4)
MCHC RBC-ENTMCNC: 21.3 PG — LOW (ref 27–31)
MCHC RBC-ENTMCNC: 21.6 PG — LOW (ref 27–31)
MCHC RBC-ENTMCNC: 22.1 PG — LOW (ref 27–31)
MCHC RBC-ENTMCNC: 22.4 PG — LOW (ref 27–31)
MCHC RBC-ENTMCNC: 22.4 PG — LOW (ref 27–31)
MCHC RBC-ENTMCNC: 30.9 G/DL — LOW (ref 32–37)
MCHC RBC-ENTMCNC: 31.8 G/DL — LOW (ref 32–37)
MCHC RBC-ENTMCNC: 31.9 G/DL — LOW (ref 32–37)
MCHC RBC-ENTMCNC: 31.9 G/DL — LOW (ref 32–37)
MCHC RBC-ENTMCNC: 32.2 G/DL — SIGNIFICANT CHANGE UP (ref 32–37)
MCV RBC AUTO: 67.8 FL — LOW (ref 80–94)
MCV RBC AUTO: 68.8 FL — LOW (ref 80–94)
MCV RBC AUTO: 69.5 FL — LOW (ref 80–94)
MCV RBC AUTO: 69.6 FL — LOW (ref 80–94)
MCV RBC AUTO: 70.1 FL — LOW (ref 80–94)
NRBC # BLD: 0 /100 WBCS — SIGNIFICANT CHANGE UP (ref 0–0)
PHOSPHATE SERPL-MCNC: 3.3 MG/DL — SIGNIFICANT CHANGE UP (ref 2.1–4.9)
PHOSPHATE SERPL-MCNC: 3.7 MG/DL — SIGNIFICANT CHANGE UP (ref 2.1–4.9)
PLATELET # BLD AUTO: 175 K/UL — SIGNIFICANT CHANGE UP (ref 130–400)
PLATELET # BLD AUTO: 179 K/UL — SIGNIFICANT CHANGE UP (ref 130–400)
PLATELET # BLD AUTO: 182 K/UL — SIGNIFICANT CHANGE UP (ref 130–400)
PLATELET # BLD AUTO: 203 K/UL — SIGNIFICANT CHANGE UP (ref 130–400)
PLATELET # BLD AUTO: 207 K/UL — SIGNIFICANT CHANGE UP (ref 130–400)
POTASSIUM SERPL-MCNC: 3.9 MMOL/L — SIGNIFICANT CHANGE UP (ref 3.5–5)
POTASSIUM SERPL-MCNC: 4 MMOL/L — SIGNIFICANT CHANGE UP (ref 3.5–5)
POTASSIUM SERPL-SCNC: 3.9 MMOL/L — SIGNIFICANT CHANGE UP (ref 3.5–5)
POTASSIUM SERPL-SCNC: 4 MMOL/L — SIGNIFICANT CHANGE UP (ref 3.5–5)
PROTHROM AB SERPL-ACNC: 15.4 SEC — HIGH (ref 9.95–12.87)
RBC # BLD: 3.2 M/UL — LOW (ref 4.7–6.1)
RBC # BLD: 3.29 M/UL — LOW (ref 4.7–6.1)
RBC # BLD: 3.52 M/UL — LOW (ref 4.7–6.1)
RBC # BLD: 3.57 M/UL — LOW (ref 4.7–6.1)
RBC # BLD: 3.75 M/UL — LOW (ref 4.7–6.1)
RBC # FLD: 23.9 % — HIGH (ref 11.5–14.5)
RBC # FLD: 24.1 % — HIGH (ref 11.5–14.5)
RBC # FLD: 24.4 % — HIGH (ref 11.5–14.5)
RBC # FLD: 24.4 % — HIGH (ref 11.5–14.5)
RBC # FLD: 24.6 % — HIGH (ref 11.5–14.5)
SODIUM SERPL-SCNC: 136 MMOL/L — SIGNIFICANT CHANGE UP (ref 135–146)
SODIUM SERPL-SCNC: 137 MMOL/L — SIGNIFICANT CHANGE UP (ref 135–146)
WBC # BLD: 7.89 K/UL — SIGNIFICANT CHANGE UP (ref 4.8–10.8)
WBC # BLD: 8.1 K/UL — SIGNIFICANT CHANGE UP (ref 4.8–10.8)
WBC # BLD: 8.45 K/UL — SIGNIFICANT CHANGE UP (ref 4.8–10.8)
WBC # BLD: 9.19 K/UL — SIGNIFICANT CHANGE UP (ref 4.8–10.8)
WBC # BLD: 9.57 K/UL — SIGNIFICANT CHANGE UP (ref 4.8–10.8)
WBC # FLD AUTO: 7.89 K/UL — SIGNIFICANT CHANGE UP (ref 4.8–10.8)
WBC # FLD AUTO: 8.1 K/UL — SIGNIFICANT CHANGE UP (ref 4.8–10.8)
WBC # FLD AUTO: 8.45 K/UL — SIGNIFICANT CHANGE UP (ref 4.8–10.8)
WBC # FLD AUTO: 9.19 K/UL — SIGNIFICANT CHANGE UP (ref 4.8–10.8)
WBC # FLD AUTO: 9.57 K/UL — SIGNIFICANT CHANGE UP (ref 4.8–10.8)

## 2019-08-14 PROCEDURE — 99233 SBSQ HOSP IP/OBS HIGH 50: CPT

## 2019-08-14 PROCEDURE — 93010 ELECTROCARDIOGRAM REPORT: CPT

## 2019-08-14 RX ORDER — MAGNESIUM SULFATE 500 MG/ML
1 VIAL (ML) INJECTION ONCE
Refills: 0 | Status: COMPLETED | OUTPATIENT
Start: 2019-08-14 | End: 2019-08-14

## 2019-08-14 RX ORDER — POTASSIUM PHOSPHATE, MONOBASIC POTASSIUM PHOSPHATE, DIBASIC 236; 224 MG/ML; MG/ML
9 INJECTION, SOLUTION INTRAVENOUS ONCE
Refills: 0 | Status: COMPLETED | OUTPATIENT
Start: 2019-08-14 | End: 2019-08-14

## 2019-08-14 RX ADMIN — Medication 100 MILLIGRAM(S): at 14:19

## 2019-08-14 RX ADMIN — ONDANSETRON 4 MILLIGRAM(S): 8 TABLET, FILM COATED ORAL at 05:43

## 2019-08-14 RX ADMIN — Medication 10 MILLIGRAM(S): at 15:29

## 2019-08-14 RX ADMIN — OXYCODONE HYDROCHLORIDE 5 MILLIGRAM(S): 5 TABLET ORAL at 16:00

## 2019-08-14 RX ADMIN — Medication 650 MILLIGRAM(S): at 05:43

## 2019-08-14 RX ADMIN — OXYCODONE HYDROCHLORIDE 5 MILLIGRAM(S): 5 TABLET ORAL at 22:28

## 2019-08-14 RX ADMIN — Medication 100 MILLIGRAM(S): at 21:42

## 2019-08-14 RX ADMIN — SENNA PLUS 2 TABLET(S): 8.6 TABLET ORAL at 21:42

## 2019-08-14 RX ADMIN — Medication 25 MILLIGRAM(S): at 11:45

## 2019-08-14 RX ADMIN — POTASSIUM PHOSPHATE, MONOBASIC POTASSIUM PHOSPHATE, DIBASIC 63.25 MILLIMOLE(S): 236; 224 INJECTION, SOLUTION INTRAVENOUS at 05:44

## 2019-08-14 RX ADMIN — AMLODIPINE BESYLATE 2.5 MILLIGRAM(S): 2.5 TABLET ORAL at 11:45

## 2019-08-14 RX ADMIN — ONDANSETRON 4 MILLIGRAM(S): 8 TABLET, FILM COATED ORAL at 18:03

## 2019-08-14 RX ADMIN — ONDANSETRON 4 MILLIGRAM(S): 8 TABLET, FILM COATED ORAL at 11:46

## 2019-08-14 RX ADMIN — OXYCODONE HYDROCHLORIDE 5 MILLIGRAM(S): 5 TABLET ORAL at 15:31

## 2019-08-14 RX ADMIN — OXYCODONE HYDROCHLORIDE 10 MILLIGRAM(S): 5 TABLET ORAL at 18:05

## 2019-08-14 RX ADMIN — ATORVASTATIN CALCIUM 80 MILLIGRAM(S): 80 TABLET, FILM COATED ORAL at 21:42

## 2019-08-14 RX ADMIN — OXYCODONE HYDROCHLORIDE 10 MILLIGRAM(S): 5 TABLET ORAL at 02:54

## 2019-08-14 RX ADMIN — OXYCODONE HYDROCHLORIDE 10 MILLIGRAM(S): 5 TABLET ORAL at 11:45

## 2019-08-14 RX ADMIN — OXYCODONE HYDROCHLORIDE 5 MILLIGRAM(S): 5 TABLET ORAL at 21:58

## 2019-08-14 RX ADMIN — PANTOPRAZOLE SODIUM 40 MILLIGRAM(S): 20 TABLET, DELAYED RELEASE ORAL at 11:45

## 2019-08-14 RX ADMIN — OXYCODONE HYDROCHLORIDE 10 MILLIGRAM(S): 5 TABLET ORAL at 18:30

## 2019-08-14 RX ADMIN — Medication 100 GRAM(S): at 04:34

## 2019-08-14 RX ADMIN — OXYCODONE HYDROCHLORIDE 10 MILLIGRAM(S): 5 TABLET ORAL at 12:15

## 2019-08-14 RX ADMIN — LOSARTAN POTASSIUM 25 MILLIGRAM(S): 100 TABLET, FILM COATED ORAL at 11:45

## 2019-08-14 NOTE — PHYSICAL THERAPY INITIAL EVALUATION ADULT - GAIT DISTANCE, PT EVAL
150 feet/Pt declined further amb 2* c/o chest/abdominal discomfort along midline. Pt states he has notified MD and RN already and has had pain at rest all day.

## 2019-08-14 NOTE — PHYSICAL THERAPY INITIAL EVALUATION ADULT - LEVEL OF INDEPENDENCE: STAIR NEGOTIATION, REHAB EVAL
Pt requested to return to room during amb due to chest discomfort however unlikely to have difficulty with stair negotiation based on independent transfers/amb/bed mobility without AD at this time.

## 2019-08-14 NOTE — PROGRESS NOTE ADULT - SUBJECTIVE AND OBJECTIVE BOX
SUNIL VILLALOBOS  [id number]  44y Male    Indication for ICU admission:  FAll with Grade 3 splenic laceration, 10th rib fx right    Admit Date:08-10-19  ICU Date: 8/10/19  OR Date: N/A    No Known Allergies    PAST MEDICAL & SURGICAL HISTORY:  Stroke: 3 weeks ago  HTN (hypertension)    Home Medications:  amlodipine 2.5mg QD  losartan 25 mg QD  ASA 81mg QD  lipitor 80mg QD      24HRS EVENT:    Neurologic: AO x 3  S/p suspected ischemic stroke 6/19 ? s/p tPA- on ASA at home  No residual deficits, workup incomplete d/t pt AMA departure  F/u syncope w/u: EEG- neg, Carotid duplex- No significant ICA stenosis  Pain control: Tylenol, oxy PRN, given one dose Dilaudid     Respiratory:   Subacute nondisplaced L 10th fx  Satting well on RA, encourage IS    Cardiovascular:   HTN -On home amlodipine& Losartan.& HCTZ addeded Labetalol 100 mg q8h  HLD- Home atorvastatin   Echo - EF 71%, G1DD, mod LVH    Gastrointestinal/Nutrition:  Grade 3 splenic lac - No active extrav on CTA  s/p embolization of inferior splenic artery branches, right femoral access  Serial abdominal exams, Trend lactates, CBC q6h- w/decreas in HGB 6.8  DASH diet  GI ppx: PTX    Renal/Genitourinary:   CANDICE - Baseline Cr 1, now 0.9  IVL, Voiding      Hematologic:   Hemoperitoneum on CT  H&H downtrending 7.1>7.0>7.2>6.8  Transfused 1u PRBC 8/12  Transfused 2 units overnight  Held ASA, HSQ    Infectious Disease:   No acute diagnoses. Will not require vaccines    Endocrine:   HbA1C @ 4.8  DM - On ISS  Q4H FS    MSK  OOBA    DVT PTX: HSQ on hold  GI PTX: PPI    ***Tubes/Lines/Drains  ***  Peripheral IV        REVIEW OF SYSTEMS    [x ] A ten-point review of systems was otherwise negative except as noted.  [ ] Due to altered mental status/intubation, subjective information were not able to be obtained from the patient. History was obtained, to the extent possible, from review of the chart and collateral sources of information. SUNIL VILLALOBOS  [id number]  44y Male    Indication for ICU admission:  FAll with Grade 3 splenic laceration, 10th rib fx right    Admit Date:08-10-19  ICU Date: 8/10/19  OR Date: N/A    No Known Allergies    PAST MEDICAL & SURGICAL HISTORY:  Stroke: 3 weeks ago  HTN (hypertension)    Home Medications:  amlodipine 2.5mg QD  losartan 25 mg QD  ASA 81mg QD  lipitor 80mg QD      24HRS EVENT:    Neurologic: AO x 3  S/p suspected ischemic stroke  ? s/p tPA- on ASA at home  No residual deficits, workup incomplete d/t pt AMA departure  F/u syncope w/u: EEG- neg, Carotid duplex- No significant ICA stenosis  Pain control: Tylenol, oxy PRN, given one dose Dilaudid     Respiratory:   Subacute nondisplaced L 10th fx  Satting well on RA, encourage IS    Cardiovascular:   HTN -On home amlodipine& Losartan.& HCTZ addeded Labetalol 100 mg q8h  HLD- Home atorvastatin   Echo - EF 71%, G1DD, mod LVH    Gastrointestinal/Nutrition:  Grade 3 splenic lac - No active extrav on CTA  s/p embolization of inferior splenic artery branches, right femoral access  Serial abdominal exams, Trend lactates, CBC q6h- w/decreas in HGB 6.8  DASH diet  GI ppx: PTX    Renal/Genitourinary:   CANDICE - Baseline Cr 1, now 0.9  IVL, Voiding      Hematologic:   Hemoperitoneum on CT  H&H downtrending 7.1>7.0>7.2>6.8  Transfused 1u PRBC   Transfused 2 units overnight  Held ASA, HSQ    Infectious Disease:   No acute diagnoses. Will not require vaccines    Endocrine:   HbA1C @ 4.8  DM - On ISS  Q4H FS    MSK  OOBA    DVT PTX: HSQ on hold  GI PTX: PPI    ***Tubes/Lines/Drains  ***  Peripheral IV        REVIEW OF SYSTEMS    [x ] A ten-point review of systems was otherwise negative except as noted.  [ ] Due to altered mental status/intubation, subjective information were not able to be obtained from the patient. History was obtained, to the extent possible, from review of the chart and collateral sources of information.    Daily     Daily Weight in k.6 (13 Aug 2019 04:00)    Diet, DASH/TLC:   Sodium & Cholesterol Restricted (19 @ 02:36)      CURRENT MEDS:  Neurologic Medications  acetaminophen   Tablet .. 650 milliGRAM(s) Oral every 6 hours  ondansetron Injectable 4 milliGRAM(s) IV Push every 6 hours  oxyCODONE    IR 5 milliGRAM(s) Oral every 6 hours PRN Moderate Pain (4 - 6)  oxyCODONE    IR 10 milliGRAM(s) Oral every 6 hours PRN Severe Pain (7 - 10)    Respiratory Medications    Cardiovascular Medications  amLODIPine   Tablet 2.5 milliGRAM(s) Oral daily  hydrochlorothiazide 25 milliGRAM(s) Oral daily  labetalol 100 milliGRAM(s) Oral every 8 hours  losartan 25 milliGRAM(s) Oral daily    Gastrointestinal Medications  magnesium sulfate  IVPB 1 Gram(s) IV Intermittent once  pantoprazole    Tablet 40 milliGRAM(s) Oral before breakfast  potassium phosphate IVPB 9 milliMole(s) IV Intermittent once  senna 2 Tablet(s) Oral at bedtime    Genitourinary Medications    Hematologic/Oncologic Medications    Antimicrobial/Immunologic Medications    Endocrine/Metabolic Medications  atorvastatin 80 milliGRAM(s) Oral at bedtime  dextrose 50% Injectable 25 Gram(s) IV Push once  glucagon  Injectable 1 milliGRAM(s) IntraMuscular once PRN Glucose LESS THAN 70 milligrams/deciliter    Topical/Other Medications  chlorhexidine 4% Liquid 1 Application(s) Topical daily      ICU Vital Signs Last 24 Hrs  T(C): 36.2 (14 Aug 2019 00:00), Max: 36.6 (13 Aug 2019 16:00)  T(F): 97.2 (14 Aug 2019 00:00), Max: 97.8 (13 Aug 2019 16:00)  HR: 84 (14 Aug 2019 01:00) (76 - 100)  BP: 128/62 (14 Aug 2019 01:00) (128/62 - 197/109)  BP(mean): 82 (14 Aug 2019 01:00) (82 - 150)  RR: 19 (14 Aug 2019 01:00) (10 - 21)  SpO2: 98% (14 Aug 2019 01:00) (94% - 99%)        PHYSICAL EXAM:    General/Neuro  RASS:             GCS:     = E   / V   / M      Deficits:                             alert & oriented x 3, no focal deficits  Pupils:    Lungs:      clear to auscultation, Normal expansion/effort.     Cardiovascular : S1, S2.  Regular rate and rhythm.  Peripheral edema   Cardiac Rhythm: Normal Sinus Rhythm    GI: Abdomen soft, Non-tender, Non-distended.    Gastrostomy / Jejunostomy tube in place.  Nasogastric tube in place.  Colostomy / Ileostomy.    Wound:    Extremities: Extremities warm, pink, well-perfused. Pulses:Rt     Lt    Derm: Good skin turgor, no skin breakdown.      :  Voids      CXR:     LABS:  CAPILLARY BLOOD GLUCOSE                              7.1    8.45  )-----------( 182      ( 14 Aug 2019 02:10 )             22.3       08-14    136  |  95<L>  |  9<L>  ----------------------------<  123<H>  3.9   |  31  |  1.0    Ca    8.8      14 Aug 2019 00:56  Phos  3.3     -  Mg     1.9     08-14        PT/INR - ( 14 Aug 2019 00:56 )   PT: 15.40 sec;   INR: 1.34 ratio         PTT - ( 14 Aug 2019 00:56 )  PTT:30.4 sec SUNIL VILLALOBOS  [id number]  44y Male    Indication for ICU admission:  FAll with Grade 3 splenic laceration, 10th rib fx right    Admit Date:08-10-19  ICU Date: 8/10/19  OR Date: N/A    No Known Allergies    PAST MEDICAL & SURGICAL HISTORY:  Stroke: 3 weeks ago  HTN (hypertension)    Home Medications:  amlodipine 2.5mg QD  losartan 25 mg QD  ASA 81mg QD  lipitor 80mg QD      24HRS EVENT:    Neurologic: AO x 3  S/p suspected ischemic stroke  ? s/p tPA- on ASA at home  No residual deficits, workup incomplete d/t pt AMA departure  F/u syncope w/u: EEG- neg, Carotid duplex- No significant ICA stenosis  Pain control: Tylenol, oxy PRN, given one dose Dilaudid     Respiratory:   Subacute nondisplaced L 10th fx  Satting well on RA, encourage IS    Cardiovascular:   HTN -On home amlodipine& Losartan.& HCTZ addeded Labetalol 100 mg q8h  HLD- Home atorvastatin   Echo - EF 71%, G1DD, mod LVH    Gastrointestinal/Nutrition:  Grade 3 splenic lac - No active extrav on CTA  s/p embolization of inferior splenic artery branches, right femoral access  Serial abdominal exams, Trend lactates, CBC q6h- w/decreas in HGB 6.8  DASH diet  GI ppx: PTX    Renal/Genitourinary:   CANDICE - Baseline Cr 1, now 0.9  IVL, Voiding      Hematologic:   Hemoperitoneum on CT  H&H downtrending 7.1>7.0>7.2>6.8  Transfused 1u PRBC   Transfused 2 units overnight  Held ASA, HSQ    Infectious Disease:   No acute diagnoses. Will not require vaccines    Endocrine:   HbA1C @ 4.8  DM - On ISS  Q4H FS    MSK  OOBA    DVT PTX: HSQ on hold  GI PTX: PPI    ***Tubes/Lines/Drains  ***  Peripheral IV        REVIEW OF SYSTEMS    [x ] A ten-point review of systems was otherwise negative except as noted.  [ ] Due to altered mental status/intubation, subjective information were not able to be obtained from the patient. History was obtained, to the extent possible, from review of the chart and collateral sources of information.    Daily     Daily Weight in k.6 (13 Aug 2019 04:00)    Diet, DASH/TLC:   Sodium & Cholesterol Restricted (19 @ 02:36)      CURRENT MEDS:  Neurologic Medications  acetaminophen   Tablet .. 650 milliGRAM(s) Oral every 6 hours  ondansetron Injectable 4 milliGRAM(s) IV Push every 6 hours  oxyCODONE    IR 5 milliGRAM(s) Oral every 6 hours PRN Moderate Pain (4 - 6)  oxyCODONE    IR 10 milliGRAM(s) Oral every 6 hours PRN Severe Pain (7 - 10)    Respiratory Medications    Cardiovascular Medications  amLODIPine   Tablet 2.5 milliGRAM(s) Oral daily  hydrochlorothiazide 25 milliGRAM(s) Oral daily  labetalol 100 milliGRAM(s) Oral every 8 hours  losartan 25 milliGRAM(s) Oral daily    Gastrointestinal Medications  magnesium sulfate  IVPB 1 Gram(s) IV Intermittent once  pantoprazole    Tablet 40 milliGRAM(s) Oral before breakfast  potassium phosphate IVPB 9 milliMole(s) IV Intermittent once  senna 2 Tablet(s) Oral at bedtime    Genitourinary Medications    Hematologic/Oncologic Medications    Antimicrobial/Immunologic Medications    Endocrine/Metabolic Medications  atorvastatin 80 milliGRAM(s) Oral at bedtime  dextrose 50% Injectable 25 Gram(s) IV Push once  glucagon  Injectable 1 milliGRAM(s) IntraMuscular once PRN Glucose LESS THAN 70 milligrams/deciliter    Topical/Other Medications  chlorhexidine 4% Liquid 1 Application(s) Topical daily      ICU Vital Signs Last 24 Hrs  T(C): 36.2 (14 Aug 2019 00:00), Max: 36.6 (13 Aug 2019 16:00)  T(F): 97.2 (14 Aug 2019 00:00), Max: 97.8 (13 Aug 2019 16:00)  HR: 84 (14 Aug 2019 01:00) (76 - 100)  BP: 128/62 (14 Aug 2019 01:00) (128/62 - 197/109)  BP(mean): 82 (14 Aug 2019 01:00) (82 - 150)  RR: 19 (14 Aug 2019 01:00) (10 - 21)  SpO2: 98% (14 Aug 2019 01:00) (94% - 99%)        PHYSICAL EXAM:    General/Neuro    Deficits: alert & oriented x 3, no focal deficits  Pupils: PERRLA    Lungs: clear to auscultation, Normal expansion/effort.     Cardiovascular : S1, S2.  Regular rate and rhythm.     Cardiac Rhythm: Normal Sinus Rhythm    GI: Abdomen soft, Non-tender, Non-distended.      Extremities: Extremities warm, pink, well-perfused.    Derm: Good skin turgor, no skin breakdown.      :  Voids    LABS:                      7.1    8.45  )-----------( 182      ( 14 Aug 2019 02:10 )             22.3       08-14    136  |  95<L>  |  9<L>  ----------------------------<  123<H>  3.9   |  31  |  1.0    Ca    8.8      14 Aug 2019 00:56  Phos  3.3     08-14  Mg     1.9     08-14        PT/INR - ( 14 Aug 2019 00:56 )   PT: 15.40 sec;   INR: 1.34 ratio    PTT - ( 14 Aug 2019 00:56 )  PTT:30.4 sec

## 2019-08-14 NOTE — PROGRESS NOTE ADULT - ASSESSMENT
44y M PMH HTN s/p suspected stroke treated w/ tPA bolus 6/6/19, DM presenting s/p fall (sometime in past week) w grade 3 splenic laceration, hemoperitoneum, without active extravasation..    Neurologic: AO x 3  S/p suspected ischemic stroke 6/19 ? s/p tPA  No residual deficits, workup incomplete d/t pt AMA departure  F/u syncope w/u: EEG- neg, Carotid duplex- no significant stenosis   Pain control: Tylenol, oxy PRN   Held ASA    Respiratory:   Subacute nondisplaced L 10th fx  Satting well on RA, encourage IS    Cardiovascular:   HTN - amlodipine, losartan, labetalol, HCTZ- BP better controlled in 140-150 range  HLD- continue Atorvastatin  Echo - EF 71%, G1DD, mod LVH    Gastrointestinal/Nutrition:  Grade 3 splenic lac - No active extrav on CTA  S/P Angio embolization of inferior splenic artery branches, right femoral access  Serial abdominal exams, Trend lactates, CBC q6h  DASH diet  GI ppx: PTX      Renal/Genitourinary:   CANDICE - Resolved  IVL, Voiding  adequately    Hematologic:   Hemoperitoneum on CT  Serial CBCs: 9.2--8.3>7.3 (1pRBC)> 8.4>8.4>7.5>7 (1pRBC) > 7.1>7.0>6.8  Trend hgb -   started HSQ - now d/c'd    Infectious Disease:   No acute diagnoses.    Endocrine:   HbA1C @ 4.8  DM - On ISS  FS and coverage qac/hs    MSK  OOBTC      DVT PTX: HSQ- on hold until HGB stabilizes    GI PTX: PPI 44y M PMH HTN s/p suspected stroke treated w/ tPA bolus 6/6/19, DM presenting s/p fall (sometime in past week) w grade 3 splenic laceration, hemoperitoneum, without active extravasation..    Neurologic: AO x 3  S/p suspected ischemic stroke 6/19 ? s/p tPA  No residual deficits, workup incomplete d/t pt AMA departure  F/u syncope w/u: EEG- neg, Carotid duplex- no significant stenosis   Pain control: Tylenol, oxy PRN   Held ASA    Respiratory:   Subacute nondisplaced L 10th fx  Satting well on RA, encourage IS    Cardiovascular:   HTN - amlodipine, losartan, labetalol, HCTZ- BP better controlled in 140-150 range  HLD- continue Atorvastatin  Echo - EF 71%, G1DD, mod LVH    Gastrointestinal/Nutrition:  Grade 3 splenic lac - No active extrav on CTA  S/P Angio embolization of inferior splenic artery branches, right femoral access  Serial abdominal exams, Trend lactates, CBC q6h  DASH diet  GI ppx: PTX      Renal/Genitourinary:   CANDICE - Resolved  IVL, Voiding  adequately  Hypo phosphatemia, hypokalemia hypomagnesemia- electrolytes replaced    Hematologic:   Hemoperitoneum on CT  Serial CBCs: 9.2--8.3>7.3 (1pRBC)> 8.4>8.4>7.5>7 (1pRBC) > 7.1>7.0>6.8  Trend hgb -   started HSQ - now d/c'd    Infectious Disease:   No acute diagnoses.    Endocrine:   HbA1C @ 4.8  DM - On ISS  FS and coverage qac/hs    MSK  OOBTC      DVT PTX: HSQ- on hold until HGB stabilizes    GI PTX: PPI

## 2019-08-14 NOTE — CHART NOTE - NSCHARTNOTEFT_GEN_A_CORE
SICU Transfer Note:    Transfer from: SICU  Transfer to:  (x) Surgery    (  ) Telemetry    (  ) Medicine    (  ) Palliative    (  ) Stroke Unit    (  ) _______________      SICU COURSE:  44 y.o. male with history of hypertension, DM, and ischemic stroke 2 months ago, at which time he received tPa bolus and signed out AMA on hospital day #1, presents to ED today with 5 day history of worsening LLQ abdominal pain that is now diffuse.  He reports vomiting.  No fever, chills, diarrhea or constipation. Reports unintentional weight loss of ~30lb in the past couple of months.     SICU consulted d/t splenic laceration, findings of hemoperitoneum. History obtained from patient and family members at bedside. He reports having had abdominal pain for the past week, worse in the LLQ. Per family, he has had multiple episodes of witnessed falls this past week, the most recent this morning when he was in kitchen and fell forward onto the sink, then fell backwards and hit his head. Patient does not recall any episodes of falling during the week, nor does he remember hitting his head today. He reports worsening lethargy during the week, requiring the patient to stay in bed for 2 days. He reports reduction in appetite over the past week, with two episodes of emesis this morning described as an orange color. Patient reports no alleviating factors for his pain. Denies SOB, F, chills, chest pain.      8/10: Given morphine 2mg x 2, dilaudid x 1, Labetalol push x 2 o/n for BP 150s/100s, started on Cleviprex gtt for hypertension. Lactate downtrending, Hgb downtrended 7.3, 1PRBC now 8.4  8/11: Came off cleviprex gtt, Pain meds were d/c'd, syncope workup: EEG- neg, Echo - EF 71%, G1DD, mod LVH, Restarted amlodipine, atorvastatin, Repeat CBC stable 8.4, started diet, HSQ, pm Hgb 7.5  8/12: Got selective embolization of inferior splenic artery. s/p 1 unit PRBC, cbc post op 7.7->7.1--> npo, bedrest, Carotid duplex Right, 40-59% stenosis, 20-39% stenosis on left. Hgb 7.5 -->7 s/p 1u prbc   8/13: Placed on diet, OOBTC, Got push of Dilaudid and Vasotec, Labetalol IV, started Labetalol 100 q8.   8/14: S/p I unit pRBC for Hgb 7.1, now normotensive, ready for downgrade    PAST MEDICAL & SURGICAL HISTORY:  Stroke: 3 weeks ago  HTN (hypertension)  No significant past surgical history    Allergies    No Known Allergies    Intolerances      MEDICATIONS  (STANDING):  amLODIPine   Tablet 2.5 milliGRAM(s) Oral daily  atorvastatin 80 milliGRAM(s) Oral at bedtime  chlorhexidine 4% Liquid 1 Application(s) Topical daily  dextrose 50% Injectable 25 Gram(s) IV Push once  hydrochlorothiazide 25 milliGRAM(s) Oral daily  labetalol 100 milliGRAM(s) Oral every 8 hours  losartan 25 milliGRAM(s) Oral daily  ondansetron Injectable 4 milliGRAM(s) IV Push every 6 hours  pantoprazole    Tablet 40 milliGRAM(s) Oral before breakfast  senna 2 Tablet(s) Oral at bedtime    MEDICATIONS  (PRN):  glucagon  Injectable 1 milliGRAM(s) IntraMuscular once PRN Glucose LESS THAN 70 milligrams/deciliter  oxyCODONE    IR 5 milliGRAM(s) Oral every 6 hours PRN Moderate Pain (4 - 6)  oxyCODONE    IR 10 milliGRAM(s) Oral every 6 hours PRN Severe Pain (7 - 10)        Vital Signs Last 24 Hrs  T(C): 37.7 (14 Aug 2019 04:49), Max: 37.7 (14 Aug 2019 04:49)  T(F): 99.9 (14 Aug 2019 04:49), Max: 99.9 (14 Aug 2019 04:49)  HR: 80 (14 Aug 2019 07:00) (76 - 100)  BP: 123/58 (14 Aug 2019 07:00) (108/57 - 197/109)  BP(mean): 79 (14 Aug 2019 07:00) (74 - 150)  RR: 11 (14 Aug 2019 07:00) (10 - 20)  SpO2: 96% (14 Aug 2019 07:00) (95% - 99%)  I&O's Summary    13 Aug 2019 07:01  -  14 Aug 2019 07:00  --------------------------------------------------------  IN: 1155 mL / OUT: 2700 mL / NET: -1545 mL        LABS                                            7.1                   Neurophils% (auto):   x      (08-14 @ 02:10):    8.45 )-----------(182          Lymphocytes% (auto):  x                                             22.3                   Eosinphils% (auto):   x        Manual%: Neutrophils x    ; Lymphocytes x    ; Eosinophils x    ; Bands%: x    ; Blasts x                136    |  95     |  9                      Calcium: 8.8   / iCa: x        ----------------------------<  123       Magnesium: 1.9          3.9     |  31     |  1.0                    Phosphorous: 3.3        PT: 15.40 sec;   INR: 1.34 ratio  aPTT: 30.4 sec    Neurologic: AO x 3  S/p suspected ischemic stroke 6/19 ? s/p tPA- on ASA at home  No residual deficits, workup incomplete d/t pt AMA departure  F/u syncope w/u: EEG- neg, Carotid duplex- No significant ICA stenosis  Pain control: Tylenol, oxy PRN, given one dose Dilaudid     Respiratory:   Subacute nondisplaced L 10th fx  Satting well on RA, encourage IS    Cardiovascular:   HTN -On home amlodipine& Losartan.& HCTZ addeded Labetalol 100 mg q8h  HLD- Home atorvastatin   Echo - EF 71%, G1DD, mod LVH    Gastrointestinal/Nutrition:  Grade 3 splenic lac - No active extrav on CTA  s/p embolization of inferior splenic artery branches, right femoral access  Serial abdominal exams, Trend lactates, CBC q6h- w/ decrease in HGB 6.8  DASH diet  GI ppx: PTX    Renal/Genitourinary:   CANDICE - Baseline Cr 1, now 1.0  IVL, Voiding      Hematologic:   Hemoperitoneum on CT  H&H downtrending 7.1>7.0>7.2>6.8  Transfused 1u PRBC 8/12, and 8/14  Total 4 units pRBCs  Held ASA, HSQ    Infectious Disease:   No acute diagnoses. Will not require vaccines    Endocrine:   HbA1C @ 4.8  DM - On ISS  Q4H FS    MSK  OOBTC    Disposition: DOWNGRADE    For Follow-Up:  Trend CBC   Monitor for hypertension, started Labetalol   Will not require vaccines

## 2019-08-14 NOTE — PHYSICAL THERAPY INITIAL EVALUATION ADULT - PERTINENT HX OF CURRENT PROBLEM, REHAB EVAL
44 y.o. male with history of hypertension and ischemic stroke 2 months ago, at which time he received tPa bolus and signed out AMA on hospital day #1, presents to ED today with 5 day history of worsening LLQ abdominal pain that is now diffuse.

## 2019-08-15 ENCOUNTER — TRANSCRIPTION ENCOUNTER (OUTPATIENT)
Age: 44
End: 2019-08-15

## 2019-08-15 VITALS
DIASTOLIC BLOOD PRESSURE: 83 MMHG | SYSTOLIC BLOOD PRESSURE: 119 MMHG | HEART RATE: 80 BPM | RESPIRATION RATE: 18 BRPM | TEMPERATURE: 100 F

## 2019-08-15 LAB
ANION GAP SERPL CALC-SCNC: 12 MMOL/L — SIGNIFICANT CHANGE UP (ref 7–14)
APTT BLD: 30.7 SEC — SIGNIFICANT CHANGE UP (ref 27–39.2)
BUN SERPL-MCNC: 9 MG/DL — LOW (ref 10–20)
CALCIUM SERPL-MCNC: 8.9 MG/DL — SIGNIFICANT CHANGE UP (ref 8.5–10.1)
CHLORIDE SERPL-SCNC: 94 MMOL/L — LOW (ref 98–110)
CK MB CFR SERPL CALC: <1 NG/ML — SIGNIFICANT CHANGE UP (ref 0.6–6.3)
CK SERPL-CCNC: 38 U/L — SIGNIFICANT CHANGE UP (ref 0–225)
CO2 SERPL-SCNC: 30 MMOL/L — SIGNIFICANT CHANGE UP (ref 17–32)
CREAT SERPL-MCNC: 1 MG/DL — SIGNIFICANT CHANGE UP (ref 0.7–1.5)
GLUCOSE BLDC GLUCOMTR-MCNC: 123 MG/DL — HIGH (ref 70–99)
GLUCOSE BLDC GLUCOMTR-MCNC: 162 MG/DL — HIGH (ref 70–99)
GLUCOSE SERPL-MCNC: 124 MG/DL — HIGH (ref 70–99)
HCT VFR BLD CALC: 25 % — LOW (ref 42–52)
HGB BLD-MCNC: 8 G/DL — LOW (ref 14–18)
INR BLD: 1.29 RATIO — SIGNIFICANT CHANGE UP (ref 0.65–1.3)
MAGNESIUM SERPL-MCNC: 2 MG/DL — SIGNIFICANT CHANGE UP (ref 1.8–2.4)
MCHC RBC-ENTMCNC: 22.2 PG — LOW (ref 27–31)
MCHC RBC-ENTMCNC: 32 G/DL — SIGNIFICANT CHANGE UP (ref 32–37)
MCV RBC AUTO: 69.3 FL — LOW (ref 80–94)
NRBC # BLD: 0 /100 WBCS — SIGNIFICANT CHANGE UP (ref 0–0)
PHOSPHATE SERPL-MCNC: 3.2 MG/DL — SIGNIFICANT CHANGE UP (ref 2.1–4.9)
PLATELET # BLD AUTO: 215 K/UL — SIGNIFICANT CHANGE UP (ref 130–400)
POTASSIUM SERPL-MCNC: 3.9 MMOL/L — SIGNIFICANT CHANGE UP (ref 3.5–5)
POTASSIUM SERPL-SCNC: 3.9 MMOL/L — SIGNIFICANT CHANGE UP (ref 3.5–5)
PROTHROM AB SERPL-ACNC: 14.8 SEC — HIGH (ref 9.95–12.87)
RBC # BLD: 3.61 M/UL — LOW (ref 4.7–6.1)
RBC # FLD: 24.8 % — HIGH (ref 11.5–14.5)
SODIUM SERPL-SCNC: 136 MMOL/L — SIGNIFICANT CHANGE UP (ref 135–146)
TROPONIN T SERPL-MCNC: <0.01 NG/ML — SIGNIFICANT CHANGE UP
WBC # BLD: 8.77 K/UL — SIGNIFICANT CHANGE UP (ref 4.8–10.8)
WBC # FLD AUTO: 8.77 K/UL — SIGNIFICANT CHANGE UP (ref 4.8–10.8)

## 2019-08-15 PROCEDURE — 99233 SBSQ HOSP IP/OBS HIGH 50: CPT

## 2019-08-15 PROCEDURE — 71045 X-RAY EXAM CHEST 1 VIEW: CPT | Mod: 26

## 2019-08-15 PROCEDURE — 93010 ELECTROCARDIOGRAM REPORT: CPT

## 2019-08-15 RX ORDER — LABETALOL HCL 100 MG
1 TABLET ORAL
Qty: 63 | Refills: 0
Start: 2019-08-15 | End: 2019-09-04

## 2019-08-15 RX ORDER — OXYCODONE HYDROCHLORIDE 5 MG/1
1 TABLET ORAL
Qty: 5 | Refills: 0
Start: 2019-08-15

## 2019-08-15 RX ADMIN — LOSARTAN POTASSIUM 25 MILLIGRAM(S): 100 TABLET, FILM COATED ORAL at 05:28

## 2019-08-15 RX ADMIN — ONDANSETRON 4 MILLIGRAM(S): 8 TABLET, FILM COATED ORAL at 05:29

## 2019-08-15 RX ADMIN — Medication 100 MILLIGRAM(S): at 14:39

## 2019-08-15 RX ADMIN — PANTOPRAZOLE SODIUM 40 MILLIGRAM(S): 20 TABLET, DELAYED RELEASE ORAL at 05:29

## 2019-08-15 RX ADMIN — OXYCODONE HYDROCHLORIDE 10 MILLIGRAM(S): 5 TABLET ORAL at 05:12

## 2019-08-15 RX ADMIN — Medication 100 MILLIGRAM(S): at 05:28

## 2019-08-15 RX ADMIN — OXYCODONE HYDROCHLORIDE 10 MILLIGRAM(S): 5 TABLET ORAL at 04:42

## 2019-08-15 RX ADMIN — ONDANSETRON 4 MILLIGRAM(S): 8 TABLET, FILM COATED ORAL at 11:35

## 2019-08-15 RX ADMIN — AMLODIPINE BESYLATE 2.5 MILLIGRAM(S): 2.5 TABLET ORAL at 05:29

## 2019-08-15 RX ADMIN — Medication 25 MILLIGRAM(S): at 05:29

## 2019-08-15 RX ADMIN — OXYCODONE HYDROCHLORIDE 10 MILLIGRAM(S): 5 TABLET ORAL at 12:58

## 2019-08-15 RX ADMIN — OXYCODONE HYDROCHLORIDE 10 MILLIGRAM(S): 5 TABLET ORAL at 18:20

## 2019-08-15 RX ADMIN — OXYCODONE HYDROCHLORIDE 10 MILLIGRAM(S): 5 TABLET ORAL at 12:30

## 2019-08-15 RX ADMIN — OXYCODONE HYDROCHLORIDE 10 MILLIGRAM(S): 5 TABLET ORAL at 18:52

## 2019-08-15 NOTE — PROGRESS NOTE ADULT - REASON FOR ADMISSION
Splenic laceration

## 2019-08-15 NOTE — PROGRESS NOTE ADULT - ATTENDING COMMENTS
splenic laceration   s/p embolization   advance diet   IVL   OOB and ambulate   d/c home
pt has worsening abd pain with non-appropriate response to transfusion  CTA positive, in IR now, planning to embo  still high risk for hemodynamic deterioration
abdominal pain waxing and waning  walking, tolerating diet  dulcolax suppository  if repeat cbc stable he can be downgraded
hb stable s/p embo  will feed today  oob ambulate  will repeat hb and downgrade if stable
History of ischemic stroke no residual  Grade 3 splenic laceration: Hgb trended down but patient is hemodynamically stable and creatinine improving- most likely rehydration   if repeat hgb is stable will start, stop ivf and start hsq.  Chronic HTN: restart amlodopine, hold losartan due to acute renal failure that is improving   voiding

## 2019-08-15 NOTE — PROGRESS NOTE ADULT - ASSESSMENT
44M with grade 3 splenic laceration s/p selective embolization of splenic artery branch and L 10th rib fracture.     Plan:   -FU EKG, CXR, stat labs  -encourage IS, OOB

## 2019-08-15 NOTE — DISCHARGE NOTE NURSING/CASE MANAGEMENT/SOCIAL WORK - NSDCPEPTSTRK_GEN_ALL_CORE
Call 911 for stroke/Prescribed medications/Risk factors for stroke/Stroke warning signs and symptoms/Signs and symptoms of stroke/Stroke support groups for patients, families, and friends/Need for follow up after discharge/Stroke education booklet

## 2019-08-15 NOTE — DISCHARGE NOTE PROVIDER - HOSPITAL COURSE
Mr. Fatima presented to the ED with LUQ pain on 8/10 s/p a fall. Of note, he has a history of hypertension and ischemic stroke 2 months ago which was treated with tPA. Traumatic workup revealed a grade 3 splenic laceration and a non displaced left sided 10th rib fracture. The patient was admitted to the ICU for monitoring. Repeat imaging revealed active extravasation and required embolization of a branch of the splenic artery and administration fo 3 units of PRBC over the course of his admission. Mr. Fatima' admission was also complicated by asymptomatic hypertension in the 180s at its highest. For his hypertension, he was started on labetalol, in addition to his home hydrochlorothiazide and amlodipine. After Mr. Fatima was hemodynamically stable, he was dowgraded to the floor. He has remained medically stable since and is now ready for discharge.

## 2019-08-15 NOTE — DISCHARGE NOTE PROVIDER - NSDCCPCAREPLAN_GEN_ALL_CORE_FT
PRINCIPAL DISCHARGE DIAGNOSIS  Diagnosis: Splenic laceration  Assessment and Plan of Treatment: IR embolized branch of spenic artery. Manage pain with tylenol and oxycodone for severe pain. Follow up in trauma clinic in 2 weeks. Please call to schedule an appointment.      SECONDARY DISCHARGE DIAGNOSES  Diagnosis: History of weight loss  Assessment and Plan of Treatment: Patient states that he has lost weight over past 3 weeks. Please follow up with your primary care provider.    Diagnosis: Hypertension  Assessment and Plan of Treatment: Continue amlodapine, hydrochlorothiazide, and labetalol. Please follow up with your primary care provided for further management.

## 2019-08-15 NOTE — DISCHARGE NOTE PROVIDER - CARE PROVIDER_API CALL
Ephraim Vargas)  Surgery; Surgical Critical Care  [address] [place] [address], [address] [place] [address]  Phone: [phone]  Fax: [phone]  Follow Up Time: 2 weeks

## 2019-08-15 NOTE — PROGRESS NOTE ADULT - SUBJECTIVE AND OBJECTIVE BOX
GENERAL SURGERY PROGRESS NOTE     SUNIL VILLALOBOS  44y  Male  Hospital day :5d  POD:  Procedure:     OVERNIGHT EVENTS:  Pt endorsed mid-chest, substernal pleuritic chest pain which started last night. Better with oxycodone, worse with breathing. Stat CXR and ekg were ordered. Otherwise some LLQ abdominal pain controlled with medications. Has not had a BM in a few days.     T(F): 99.1 (08-15-19 @ 04:07), Max: 99.6 (08-15-19 @ 00:00)  HR: 81 (08-15-19 @ 04:07) (74 - 98)  BP: 139/86 (08-15-19 @ 04:07) (123/58 - 172/99)  ABP: --  ABP(mean): --  RR: 18 (08-15-19 @ 04:07) (9 - 28)  SpO2: 97% (08-15-19 @ 04:07) (95% - 97%)    GI proph:  pantoprazole    Tablet 40 milliGRAM(s) Oral before breakfast    PHYSICAL EXAM:  GENERAL: NAD, well-appearing  CHEST/LUNG: Clear to auscultation bilaterally, chest pain non-reproducible with palpation  HEART: Regular rate and rhythm  ABDOMEN: Soft, TTP in LLQ, Nondistended;   EXTREMITIES:  No clubbing, cyanosis, or edema      LABS  Labs:  CAPILLARY BLOOD GLUCOSE      POCT Blood Glucose.: 134 mg/dL (14 Aug 2019 21:45)                          8.0    8.77  )-----------( 215      ( 15 Aug 2019 01:23 )             25.0         08-15    136  |  94<L>  |  9<L>  ----------------------------<  124<H>  3.9   |  30  |  1.0      Calcium, Total Serum: 8.9 mg/dL (08-15-19 @ 01:23)      Coags:     14.80  ----< 1.29    ( 15 Aug 2019 01:23 )     30.7        CARDIAC MARKERS ( 15 Aug 2019 04:26 )  x     / <0.01 ng/mL / 38 U/L / x     / <1.0 ng/mL

## 2019-08-15 NOTE — DISCHARGE NOTE NURSING/CASE MANAGEMENT/SOCIAL WORK - NSDCDPATPORTLINK_GEN_ALL_CORE
You can access the [facility] Patient Portal, offered by [facility], by registering with the following website: [email or web address]

## 2019-08-20 DIAGNOSIS — S22.32XA FRACTURE OF ONE RIB, LEFT SIDE, INITIAL ENCOUNTER FOR CLOSED FRACTURE: ICD-10-CM

## 2019-08-20 DIAGNOSIS — R10.9 UNSPECIFIED ABDOMINAL PAIN: ICD-10-CM

## 2019-08-20 DIAGNOSIS — K66.1 HEMOPERITONEUM: ICD-10-CM

## 2019-08-20 DIAGNOSIS — I10 ESSENTIAL (PRIMARY) HYPERTENSION: ICD-10-CM

## 2019-08-20 DIAGNOSIS — I69.992 FACIAL WEAKNESS FOLLOWING UNSPECIFIED CEREBROVASCULAR DISEASE: ICD-10-CM

## 2019-08-20 DIAGNOSIS — E11.9 TYPE 2 DIABETES MELLITUS WITHOUT COMPLICATIONS: ICD-10-CM

## 2019-08-20 DIAGNOSIS — E78.5 HYPERLIPIDEMIA, UNSPECIFIED: ICD-10-CM

## 2019-08-20 DIAGNOSIS — K65.9 PERITONITIS, UNSPECIFIED: ICD-10-CM

## 2019-08-20 DIAGNOSIS — E83.42 HYPOMAGNESEMIA: ICD-10-CM

## 2019-08-20 DIAGNOSIS — N17.9 ACUTE KIDNEY FAILURE, UNSPECIFIED: ICD-10-CM

## 2019-08-20 DIAGNOSIS — F17.210 NICOTINE DEPENDENCE, CIGARETTES, UNCOMPLICATED: ICD-10-CM
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
## 2019-08-20 DIAGNOSIS — E83.39 OTHER DISORDERS OF PHOSPHORUS METABOLISM: ICD-10-CM
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
## 2019-08-20 DIAGNOSIS — S36.031A MODERATE LACERATION OF SPLEEN, INITIAL ENCOUNTER: ICD-10-CM

## 2019-08-27 PROBLEM — I63.9 CEREBRAL INFARCTION, UNSPECIFIED: Chronic | Status: ACTIVE | Noted: 2019-08-10

## 2019-09-09 ENCOUNTER — APPOINTMENT (OUTPATIENT)
Dept: INTERNAL MEDICINE | Facility: CLINIC | Age: 44
End: 2019-09-09

## 2019-09-20 ENCOUNTER — APPOINTMENT (OUTPATIENT)
Dept: INTERNAL MEDICINE | Facility: CLINIC | Age: 44
End: 2019-09-20

## 2020-01-09 ENCOUNTER — CHART COPY (OUTPATIENT)
Age: 45
End: 2020-01-09

## 2020-01-28 NOTE — ED PROVIDER NOTE - NS ED MD DISPO ADMITTING SERVICE
SURG Ear Wedge Repair Text: A wedge excision was completed by carrying down an excision through the full thickness of the ear and cartilage with an inward facing Burow's triangle. The wound was then closed in a layered fashion.

## 2020-02-07 NOTE — CONSULT NOTE ADULT - CONSULT REASON
Reason for Call:  Other appointment    Detailed comments: Patient wants to make a telephone appointment for med check(blood pressure).    Phone Number Patient can be reached at: Cell number on file:    Telephone Information:   Mobile [phone]       Best Time: any    Can we leave a detailed message on this number? YES    Call taken on 2/7/2020 at 12:11 PM by Micaela Treviño       stroke code

## 2020-02-10 ENCOUNTER — APPOINTMENT (OUTPATIENT)
Dept: INTERNAL MEDICINE | Facility: CLINIC | Age: 45
End: 2020-02-10

## 2020-03-21 NOTE — PATIENT PROFILE ADULT - LAST BOWEL MOVEMENT DATE
-- DO NOT REPLY / DO NOT REPLY ALL --  -- Message is from the [facility]--    COVID-19 Universal Screening Responses:    Shortness of Breath or Difficulty Completing a Sentence: YES  Fever, Cough or Other Respiratory: YES  Close Contact, Exposure or Travel to Identified Countries: NO    Provider paged via [employer] Documentation - The below message was copied and pasted from a [employer] page:    3/21/2020 2:55:16 PM   [facility] Contact Center[facility] NURSE [phone]   Korey Lindsey MD 0\" tooltip-popup-delay=\"500\" gdhdplz-vhabzw-pg-body=\"true\" uib-tooltip=\"\" tooltip-placement=\"auto top\" tooltip-enable=\"false\" psx-ellipsis=\"\"Zuleyka Abbasi, DO   Text   [phone] PATIENT NUMBER -------------------------------- ACC NURSE LINE (IF QUESTIONS ONLY - [phone]) URGENT FROM: [facility] ABBASI PT: PERCY DUNN : 1969 CONTACT #: [phone] URGENT DISPOSITION. PT DAUGHTER CALLING DUE TO PATIENT HAVING INCREASED SOB TODAY. PATIENT IS OUT OF FLOVENT INHALER AND WOULD LIKE THAT REFILLED AS WELL AS AN ANTIBIOTIC PRESCRIBED. USES [facility] IN [place] #[phone].       10-Aug-2019

## 2020-04-02 ENCOUNTER — APPOINTMENT (OUTPATIENT)
Dept: INTERNAL MEDICINE | Facility: CLINIC | Age: 45
End: 2020-04-02

## 2021-01-04 NOTE — SWALLOW BEDSIDE ASSESSMENT ADULT - SLP PRECAUTIONS/LIMITATIONS: VISION
Quality 110: Preventive Care And Screening: Influenza Immunization: Influenza Immunization Ordered or Recommended, but not Administered due to system reason
Detail Level: Detailed
within functional limits

## 2023-02-21 ENCOUNTER — INPATIENT (INPATIENT)
Facility: HOSPITAL | Age: 48
LOS: 2 days | Discharge: HOME | DRG: 45 | End: 2023-02-24
Attending: PSYCHIATRY & NEUROLOGY | Admitting: PSYCHIATRY & NEUROLOGY
Payer: MEDICAID

## 2023-02-21 VITALS
DIASTOLIC BLOOD PRESSURE: 95 MMHG | HEART RATE: 66 BPM | OXYGEN SATURATION: 99 % | RESPIRATION RATE: 20 BRPM | TEMPERATURE: 97 F | SYSTOLIC BLOOD PRESSURE: 165 MMHG

## 2023-02-21 LAB
ALBUMIN SERPL ELPH-MCNC: 4.5 G/DL — SIGNIFICANT CHANGE UP (ref 3.5–5.2)
ALP SERPL-CCNC: 82 U/L — SIGNIFICANT CHANGE UP (ref 30–115)
ALT FLD-CCNC: 14 U/L — SIGNIFICANT CHANGE UP (ref 0–41)
ANION GAP SERPL CALC-SCNC: 10 MMOL/L — SIGNIFICANT CHANGE UP (ref 7–14)
APTT BLD: 34.4 SEC — SIGNIFICANT CHANGE UP (ref 27–39.2)
AST SERPL-CCNC: 19 U/L — SIGNIFICANT CHANGE UP (ref 0–41)
BASOPHILS # BLD AUTO: 0.03 K/UL — SIGNIFICANT CHANGE UP (ref 0–0.2)
BASOPHILS NFR BLD AUTO: 0.6 % — SIGNIFICANT CHANGE UP (ref 0–1)
BILIRUB SERPL-MCNC: 2 MG/DL — HIGH (ref 0.2–1.2)
BLD GP AB SCN SERPL QL: SIGNIFICANT CHANGE UP
BUN SERPL-MCNC: 11 MG/DL — SIGNIFICANT CHANGE UP (ref 10–20)
CALCIUM SERPL-MCNC: 9.5 MG/DL — SIGNIFICANT CHANGE UP (ref 8.4–10.4)
CHLORIDE SERPL-SCNC: 104 MMOL/L — SIGNIFICANT CHANGE UP (ref 98–110)
CO2 SERPL-SCNC: 27 MMOL/L — SIGNIFICANT CHANGE UP (ref 17–32)
CREAT SERPL-MCNC: 0.9 MG/DL — SIGNIFICANT CHANGE UP (ref 0.7–1.5)
EGFR: 105 ML/MIN/1.73M2 — SIGNIFICANT CHANGE UP
EOSINOPHIL # BLD AUTO: 0.02 K/UL — SIGNIFICANT CHANGE UP (ref 0–0.7)
EOSINOPHIL NFR BLD AUTO: 0.4 % — SIGNIFICANT CHANGE UP (ref 0–8)
GLUCOSE SERPL-MCNC: 104 MG/DL — HIGH (ref 70–99)
HCT VFR BLD CALC: 37.7 % — LOW (ref 42–52)
HGB BLD-MCNC: 11.5 G/DL — LOW (ref 14–18)
IMM GRANULOCYTES NFR BLD AUTO: 0.4 % — HIGH (ref 0.1–0.3)
INR BLD: 1.12 RATIO — SIGNIFICANT CHANGE UP (ref 0.65–1.3)
LYMPHOCYTES # BLD AUTO: 1 K/UL — LOW (ref 1.2–3.4)
LYMPHOCYTES # BLD AUTO: 19.8 % — LOW (ref 20.5–51.1)
MCHC RBC-ENTMCNC: 19 PG — LOW (ref 27–31)
MCHC RBC-ENTMCNC: 30.5 G/DL — LOW (ref 32–37)
MCV RBC AUTO: 62.2 FL — LOW (ref 80–94)
MONOCYTES # BLD AUTO: 0.34 K/UL — SIGNIFICANT CHANGE UP (ref 0.1–0.6)
MONOCYTES NFR BLD AUTO: 6.7 % — SIGNIFICANT CHANGE UP (ref 1.7–9.3)
NEUTROPHILS # BLD AUTO: 3.64 K/UL — SIGNIFICANT CHANGE UP (ref 1.4–6.5)
NEUTROPHILS NFR BLD AUTO: 72.1 % — SIGNIFICANT CHANGE UP (ref 42.2–75.2)
NRBC # BLD: 0 /100 WBCS — SIGNIFICANT CHANGE UP (ref 0–0)
PLATELET # BLD AUTO: 159 K/UL — SIGNIFICANT CHANGE UP (ref 130–400)
POTASSIUM SERPL-MCNC: 3.7 MMOL/L — SIGNIFICANT CHANGE UP (ref 3.5–5)
POTASSIUM SERPL-SCNC: 3.7 MMOL/L — SIGNIFICANT CHANGE UP (ref 3.5–5)
PROT SERPL-MCNC: 7.9 G/DL — SIGNIFICANT CHANGE UP (ref 6–8)
PROTHROM AB SERPL-ACNC: 12.8 SEC — SIGNIFICANT CHANGE UP (ref 9.95–12.87)
RBC # BLD: 6.06 M/UL — SIGNIFICANT CHANGE UP (ref 4.7–6.1)
RBC # FLD: 18.8 % — HIGH (ref 11.5–14.5)
SODIUM SERPL-SCNC: 141 MMOL/L — SIGNIFICANT CHANGE UP (ref 135–146)
TROPONIN T SERPL-MCNC: <0.01 NG/ML — SIGNIFICANT CHANGE UP
WBC # BLD: 5.05 K/UL — SIGNIFICANT CHANGE UP (ref 4.8–10.8)
WBC # FLD AUTO: 5.05 K/UL — SIGNIFICANT CHANGE UP (ref 4.8–10.8)

## 2023-02-21 PROCEDURE — 70450 CT HEAD/BRAIN W/O DYE: CPT | Mod: 26,MA

## 2023-02-21 PROCEDURE — 71046 X-RAY EXAM CHEST 2 VIEWS: CPT | Mod: 26

## 2023-02-21 RX ORDER — ATORVASTATIN CALCIUM 80 MG/1
80 TABLET, FILM COATED ORAL AT BEDTIME
Refills: 0 | Status: DISCONTINUED | OUTPATIENT
Start: 2023-02-21 | End: 2023-02-24

## 2023-02-21 RX ORDER — ASPIRIN/CALCIUM CARB/MAGNESIUM 324 MG
81 TABLET ORAL ONCE
Refills: 0 | Status: COMPLETED | OUTPATIENT
Start: 2023-02-21 | End: 2023-02-21

## 2023-02-21 NOTE — ED ADULT TRIAGE NOTE - CHIEF COMPLAINT QUOTE
patient was seen in ED yesterday with slurred speech and facial droop, patient was a stroke code with negative CT scan, patient left AMA, c/o headache x this morning

## 2023-02-21 NOTE — CONSULT NOTE ADULT - SUBJECTIVE AND OBJECTIVE BOX
Neurology  Consult Note  02-21-23    Name:  SUNIL VILLALOBOS; 47y (1975)    Chief Complaint: Slurred speech and Right facial droop started yesterday while at work  HPI:    48 yo male with PMHX CVA s/p TPA  HTN compliant diagnosed at age 23 yo. Neurology consulted for above complaints. Patient presents with right facial droop and slurred speech started yesterday at 4pm. Collateral from wife and patient state patient woke up this         PMHx:   HTN (hypertension)    Stroke      PFHx:   FH: type 2 diabetes      PSuHx:   No significant past surgical history      PSoHx:     Marital Status:  (   )    (   ) Single    (   )    (  )   Occupation:   Lives with: (  ) alone  (  ) children   (  ) spouse   (  ) parents  (  ) other  Recent Travel:     Substance Use (street drugs): (  ) never used  (  ) other:  Tobacco Usage:  (   ) never smoked   (   ) former smoker   (   ) current smoker  (     ) pack year  Alcohol Usage:  Sexual History:         Medications:  MEDICATIONS  (STANDING):    MEDICATIONS  (PRN):    Vitals:  T(C): 36.2 (02-21-23 @ 13:56), Max: 36.2 (02-21-23 @ 13:56)  HR: 66 (02-21-23 @ 13:56) (66 - 66)  BP: 165/95 (02-21-23 @ 13:56) (165/95 - 165/95)  RR: 20 (02-21-23 @ 13:56) (20 - 20)  SpO2: 99% (02-21-23 @ 13:56) (99% - 99%)    Labs:                        11.5   5.05  )-----------( 159      ( 21 Feb 2023 16:40 )             37.7     02-21    141  |  104  |  11  ----------------------------<  104<H>  3.7   |  27  |  0.9    Ca    9.5      21 Feb 2023 16:40    TPro  7.9  /  Alb  4.5  /  TBili  2.0<H>  /  DBili  x   /  AST  19  /  ALT  14  /  AlkPhos  82  02-21    CAPILLARY BLOOD GLUCOSE        LIVER FUNCTIONS - ( 21 Feb 2023 16:40 )  Alb: 4.5 g/dL / Pro: 7.9 g/dL / ALK PHOS: 82 U/L / ALT: 14 U/L / AST: 19 U/L / GGT: x             PT/INR - ( 21 Feb 2023 16:39 )   PT: 12.80 sec;   INR: 1.12 ratio         PTT - ( 21 Feb 2023 16:39 )  PTT:34.4 sec  CSF:                      PHYSICAL EXAMINATION:  General: Well-developed, well nourished, in no acute distress.  Eyes: Conjunctiva and sclera clear.  Neurologic:  - Mental Status:  Alert, awake, oriented to person, place, and time; Speech is fluent with intact naming, repetition, and comprehension; Good overall fund of knowledge; Immediate recall is 3/3 words and delayed recall is 3/3 words at 5 minutes; Able to spell WORLD backwards and perform serial 7 subtraction; Able to read and write a sentence.  - Cranial Nerves II-XII:    II:  Visual fields are full to confrontation; Pupils are equal, round, and reactive to light; Visual acuity is 20/20 bilaterally; Fundoscopic exam is normal with sharp discs.  III, IV, VI:  Extraocular movements are intact without nystagmus.  V:  Facial sensation is intact in the V1-V3 distribution bilaterally.  VII:  Face is symmetric with normal eye closure and smile  VIII:  Hearing is intact to finger rub.  IX, X:  Uvula is midline and soft palate rises symmetrically  XI:  Head turning and shoulder shrug are intact.  XII:  Tongue protudes in the midline.  - Motor:  Strength is 5/5 throughout.  There is no pronator drift.  Normal muscle bulk and tone throughout.  - Reflexes:  2+ and symmetric at the biceps, triceps, brachioradialis, knees, and ankles.  Plantar responses flexor.  - Sensory:  Intact throughout to vibration, and joint-position, light touch, pin prick.  - Coordination:  Finger-nose-finger and heel-knee-shin intact without dysmetria.  Rapid alternating hand movements intact.  - Gait:   Normal steps, base, arm swing, and turning.  Heel and toe walking are normal.  Tandem gait is normal.  Romberg testing is negative.    Radiology:  CT Head No Cont:  (21 Feb 2023 16:12)      Impression:      Plan:    Legend:  [] Uncomplete task.  [P] Ordered and pending task.  [R] Imaging done, pending read.    [x] Completed task. Neurology  Consult Note  02-21-23    Name:  SUNIL VILLALOBOS; 47y (1975)    Chief Complaint: Slurred speech and Right facial droop started yesterday while at work  HPI:    46 yo male with PMHX CVA 2019 s/p TPA at [facility],  HTN compliant diagnosed at age 23 yo. Neurology consulted for above complaints. Patient presents with right facial droop and slurred speech started yesterday at 4pm. Collateral from wife and patient state patient woke up this morning at his base line with no complaints and went to work. Around 4 pm while leaving work he called his wife urged him to take an ambulance and went to [facility] they took a CTH and prescribed him aspirin and cholesterol medication. Patient left hospital AMA and came here for further management because his slurred speech and numbness on left face has not resolved.    PMHx:   HTN (hypertension)  Stroke    PFHx:   FH: type 2 diabetes    PSuHx:   No significant past surgical history      Medications:  MEDICATIONS  (STANDING):    MEDICATIONS  (PRN):    Home Medications:  Amlodapine 10 mg daily  Lunesta 1 mg oral tablet: 1 tab(s) orally once a day (at bedtime) (12 Aug 2019 11:07)    Vitals:  T(C): 36.2 (02-21-23 @ 13:56), Max: 36.2 (02-21-23 @ 13:56)  HR: 66 (02-21-23 @ 13:56) (66 - 66)  BP: 165/95 (02-21-23 @ 13:56) (165/95 - 165/95)  RR: 20 (02-21-23 @ 13:56) (20 - 20)  SpO2: 99% (02-21-23 @ 13:56) (99% - 99%)    Labs:                        11.5   5.05  )-----------( 159      ( 21 Feb 2023 16:40 )             37.7     02-21    141  |  104  |  11  ----------------------------<  104<H>  3.7   |  27  |  0.9    Ca    9.5      21 Feb 2023 16:40    TPro  7.9  /  Alb  4.5  /  TBili  2.0<H>  /  DBili  x   /  AST  19  /  ALT  14  /  AlkPhos  82  02-21    CAPILLARY BLOOD GLUCOSE    LIVER FUNCTIONS - ( 21 Feb 2023 16:40 )  Alb: 4.5 g/dL / Pro: 7.9 g/dL / ALK PHOS: 82 U/L / ALT: 14 U/L / AST: 19 U/L / GGT: x         PT/INR - ( 21 Feb 2023 16:39 )   PT: 12.80 sec;   INR: 1.12 ratio    PTT - ( 21 Feb 2023 16:39 )  PTT:34.4 sec    CSF:    PHYSICAL EXAMINATION:  General: Well-developed, well nourished, in no acute distress.  Eyes: Conjunctiva and sclera clear.  Neurologic:  - Mental Status:  Alert, awake, oriented to person, place, and time; speech slurred   Good overall fund of knowledge; Able to read and write a sentence.  - Cranial Nerves II-XII:    II:  Visual fields are full to confrontation; Pupils are equal, round, and reactive to light.  III, IV, VI:  Extraocular movements are intact without nystagmus.  V:  Facial sensation is intact in the V1-V3 distribution bilaterally.  VII:  Face is asymmetric slight right facial droop with normal eye closure and smile  VIII:  Hearing is grossly intact to finger rub.  IX, X:  Uvula is midline and soft palate rises symmetrically  XI:  Head turning and shoulder shrug are intact.  XII:  Tongue protudes in the midline.  - Motor:  Strength is 5/5 throughout.  There is no pronator drift.  Normal muscle bulk and tone throughout.  - Reflexes:  2+ and symmetric at the biceps, triceps, brachioradialis, knees, and ankles.  Plantar responses flexor.  - Sensory:  Intact throughout to light touch.  - Coordination:  Finger-nose-finger and heel-knee-shin intact without dysmetria.   - Gait:   Normal steps, base, arm swing, and turning.  Heel and toe walking are normal.  Tandem gait is normal.      Radiology:  CT Head No Cont:  (21 Feb 2023 16:12)       Neurology  Consult Note  02-21-23    Name:  SUNIL VILLALOBOS; 47y (1975)    Chief Complaint: Slurred speech and Right facial droop started yesterday while at work  HPI:    46 yo male with PMHX CVA 2019 s/p TPA at [facility],  HTN compliant diagnosed at age 23 yo. Neurology consulted for above complaints. Patient presents with right facial droop and slurred speech started yesterday at 4pm. Collateral from wife and patient state patient woke up this morning at his base line with no complaints and went to work. Around 4 pm while leaving work he called his wife urged him to take an ambulance and went to [facility]"" they took a CTH and prescribed him aspirin and cholesterol medication. Patient left hospital AMA and came here for further management because his slurred speech and numbness on left face has not resolved.    PMHx:   HTN (hypertension)  Stroke    PFHx:   FH: type 2 diabetes    PSuHx:   No significant past surgical history      Medications:  MEDICATIONS  (STANDING):    MEDICATIONS  (PRN):    Home Medications:  Amlodapine 10 mg daily  Lunesta 1 mg oral tablet: 1 tab(s) orally once a day (at bedtime) (12 Aug 2019 11:07)    Vitals:  T(C): 36.2 (02-21-23 @ 13:56), Max: 36.2 (02-21-23 @ 13:56)  HR: 66 (02-21-23 @ 13:56) (66 - 66)  BP: 165/95 (02-21-23 @ 13:56) (165/95 - 165/95)  RR: 20 (02-21-23 @ 13:56) (20 - 20)  SpO2: 99% (02-21-23 @ 13:56) (99% - 99%)    Labs:                        11.5   5.05  )-----------( 159      ( 21 Feb 2023 16:40 )             37.7     02-21    141  |  104  |  11  ----------------------------<  104<H>  3.7   |  27  |  0.9    Ca    9.5      21 Feb 2023 16:40    TPro  7.9  /  Alb  4.5  /  TBili  2.0<H>  /  DBili  x   /  AST  19  /  ALT  14  /  AlkPhos  82  02-21    CAPILLARY BLOOD GLUCOSE    LIVER FUNCTIONS - ( 21 Feb 2023 16:40 )  Alb: 4.5 g/dL / Pro: 7.9 g/dL / ALK PHOS: 82 U/L / ALT: 14 U/L / AST: 19 U/L / GGT: x         PT/INR - ( 21 Feb 2023 16:39 )   PT: 12.80 sec;   INR: 1.12 ratio    PTT - ( 21 Feb 2023 16:39 )  PTT:34.4 sec    CSF:    PHYSICAL EXAMINATION:  General: Well-developed, well nourished, in no acute distress.  Eyes: Conjunctiva and sclera clear.  Neurologic:  - Mental Status:  Alert, awake, oriented to person, place, and time; speech slurred   Good overall fund of knowledge; Able to read and write a sentence.  - Cranial Nerves II-XII:    II:  Visual fields are full to confrontation; Pupils are equal, round, and reactive to light.  III, IV, VI:  Extraocular movements are intact without nystagmus.  V:  Facial sensation is intact in the V1-V3 distribution bilaterally.  VII:  Face is asymmetric slight right facial droop with normal eye closure and smile  VIII:  Hearing is grossly intact to finger rub.  IX, X:  Uvula is midline and soft palate rises symmetrically  XI:  Head turning and shoulder shrug are intact.  XII:  Tongue protudes in the midline.  - Motor:  Strength is 5/5 throughout.  There is no pronator drift.  Normal muscle bulk and tone throughout.  - Reflexes:  2+ and symmetric at the biceps, triceps, brachioradialis, knees, and ankles.  Plantar responses flexor.  - Sensory:  Intact throughout to light touch.  - Coordination:  Finger-nose-finger and heel-knee-shin intact without dysmetria.   - Gait:   Normal steps, base, arm swing, and turning.  Heel and toe walking are normal.  Tandem gait is normal.      Radiology:  CT Head No Cont:  (21 Feb 2023 16:12)    < from: CT Head No Cont (02.21.23 @ 16:12) >  MPRESSION:    Unremarkable non-contrast CT scan of the brain.    < end of copied text >     Neurology  Consult Note  02-21-23    Name:  SUNIL VILLALOBOS; 47y (1975)    Chief Complaint: Slurred speech and Right facial droop started yesterday while at work  HPI:    48 yo male with PMHX CVA 2019 s/p TPA at [facility],  HTN compliant diagnosed at age 25 yo. Neurology consulted for above complaints. Patient presents with right facial droop and slurred speech started yesterday at 4pm. Collateral from wife and patient state patient woke up this morning at his base line with no complaints and went to work. Around 4 pm while leaving work he called his wife urged him to take an ambulance and went to [facility] CTH was done (does not have record of results) and prescribed him aspirin and cholesterol medication. Patient left hospital AMA and came here for further management because his slurred speech and numbness on left face has not resolved.    PMHx:   HTN (hypertension)  Stroke    PFHx:   FH: type 2 diabetes    PSuHx:   No significant past surgical history    Medications:  MEDICATIONS  (STANDING):    MEDICATIONS  (PRN):    Home Medications:  Amlodapine 10 mg daily  Lunesta 1 mg oral tablet: 1 tab(s) orally once a day (at bedtime) (12 Aug 2019 11:07)    Vitals:  T(C): 36.2 (02-21-23 @ 13:56), Max: 36.2 (02-21-23 @ 13:56)  HR: 66 (02-21-23 @ 13:56) (66 - 66)  BP: 165/95 (02-21-23 @ 13:56) (165/95 - 165/95)  RR: 20 (02-21-23 @ 13:56) (20 - 20)  SpO2: 99% (02-21-23 @ 13:56) (99% - 99%)    Labs:                        11.5   5.05  )-----------( 159      ( 21 Feb 2023 16:40 )             37.7     02-21    141  |  104  |  11  ----------------------------<  104<H>  3.7   |  27  |  0.9    Ca    9.5      21 Feb 2023 16:40    TPro  7.9  /  Alb  4.5  /  TBili  2.0<H>  /  DBili  x   /  AST  19  /  ALT  14  /  AlkPhos  82  02-21    CAPILLARY BLOOD GLUCOSE    LIVER FUNCTIONS - ( 21 Feb 2023 16:40 )  Alb: 4.5 g/dL / Pro: 7.9 g/dL / ALK PHOS: 82 U/L / ALT: 14 U/L / AST: 19 U/L / GGT: x         PT/INR - ( 21 Feb 2023 16:39 )   PT: 12.80 sec;   INR: 1.12 ratio    PTT - ( 21 Feb 2023 16:39 )  PTT:34.4 sec    CSF:    PHYSICAL EXAMINATION:  General: Well-developed, well nourished, in no acute distress.  Eyes: Conjunctiva and sclera clear.  Neurologic:  - Mental Status:  Alert, awake, oriented to person, place, and time; speech slurred   Good overall fund of knowledge; Able to read and write a sentence.  - Cranial Nerves II-XII:    II:  Visual fields are full to confrontation; Pupils are equal, round, and reactive to light.  III, IV, VI:  Extraocular movements are intact without nystagmus.  V:  Facial sensation is intact in the V1-V3 distribution bilaterally.  VII:  Face is asymmetric slight right facial droop with normal eye closure and smile  VIII:  Hearing is grossly intact to finger rub.  IX, X:  Uvula is midline and soft palate rises symmetrically  XI:  Head turning and shoulder shrug are intact.  XII:  Tongue protudes in the midline.  - Motor:  Strength is 5/5 throughout.  There is no pronator drift.  Normal muscle bulk and tone throughout.  - Reflexes:  2+ and symmetric at the biceps, triceps, brachioradialis, knees, and ankles.  Plantar responses flexor.  - Sensory:  Intact throughout to light touch.  - Coordination:  Finger-nose-finger and heel-knee-shin intact without dysmetria.   - Gait:   Normal steps, base, arm swing, and turning.  Heel and toe walking are normal.  Tandem gait is normal.      Radiology:  CT Head No Cont:  (21 Feb 2023 16:12)    < from: CT Head No Cont (02.21.23 @ 16:12) >  MPRESSION:    Unremarkable non-contrast CT scan of the brain.    < end of copied text >     Neurology  Consult Note  02-21-23    Name:  SUNIL VILLALOBOS; 47y (1975)    Chief Complaint: Slurred speech and Right facial droop started yesterday while at work  HPI:    46 yo male with PMHX CVA 2019 s/p TPA at [facility],  HTN compliant diagnosed at age 25 yo. Patient presents with right facial droop and slurred speech started yesterday at 4pm and neurology was consulted. Collateral from wife and patient state patient woke up this morning at his base line with no complaints and went to work. Around 4 pm while leaving work he called his wife urged him to take an ambulance and went to [facility] CTH was done (does not have record of results) and prescribed him aspirin and cholesterol medication. Patient left hospital AMA and came here for further management because his slurred speech and numbness on left face has not resolved.    PMHx:   HTN (hypertension)  Stroke    PFHx:   FH: type 2 diabetes    PSuHx:   No significant past surgical history    Medications:  MEDICATIONS  (STANDING):    MEDICATIONS  (PRN):    Home Medications:  Amlodapine 10 mg daily  Lunesta 1 mg oral tablet: 1 tab(s) orally once a day (at bedtime) (12 Aug 2019 11:07)    Vitals:  T(C): 36.2 (02-21-23 @ 13:56), Max: 36.2 (02-21-23 @ 13:56)  HR: 66 (02-21-23 @ 13:56) (66 - 66)  BP: 165/95 (02-21-23 @ 13:56) (165/95 - 165/95)  RR: 20 (02-21-23 @ 13:56) (20 - 20)  SpO2: 99% (02-21-23 @ 13:56) (99% - 99%)    Labs:                        11.5   5.05  )-----------( 159      ( 21 Feb 2023 16:40 )             37.7     02-21    141  |  104  |  11  ----------------------------<  104<H>  3.7   |  27  |  0.9    Ca    9.5      21 Feb 2023 16:40    TPro  7.9  /  Alb  4.5  /  TBili  2.0<H>  /  DBili  x   /  AST  19  /  ALT  14  /  AlkPhos  82  02-21    CAPILLARY BLOOD GLUCOSE    LIVER FUNCTIONS - ( 21 Feb 2023 16:40 )  Alb: 4.5 g/dL / Pro: 7.9 g/dL / ALK PHOS: 82 U/L / ALT: 14 U/L / AST: 19 U/L / GGT: x         PT/INR - ( 21 Feb 2023 16:39 )   PT: 12.80 sec;   INR: 1.12 ratio    PTT - ( 21 Feb 2023 16:39 )  PTT:34.4 sec    CSF:    PHYSICAL EXAMINATION:  General: Well-developed, well nourished, in no acute distress.  Eyes: Conjunctiva and sclera clear.  Neurologic:  - Mental Status:  Alert, awake, oriented to person, place, and time; speech slurred   Good overall fund of knowledge; Able to read and write a sentence.  - Cranial Nerves II-XII:    II:  Visual fields are full to confrontation; Pupils are equal, round, and reactive to light.  III, IV, VI:  Extraocular movements are intact without nystagmus.  V:  Facial sensation is intact in the V1-V3 distribution bilaterally.  VII:  Face is asymmetric slight right facial droop with normal eye closure and smile  VIII:  Hearing is grossly intact to finger rub.  IX, X:  Uvula is midline and soft palate rises symmetrically  XI:  Head turning and shoulder shrug are intact.  XII:  Tongue protudes in the midline.  - Motor:  Strength is 5/5 throughout.  There is no pronator drift.  Normal muscle bulk and tone throughout.  - Reflexes:  2+ and symmetric at the biceps, triceps, brachioradialis, knees, and ankles.  Plantar responses flexor.  - Sensory:  Intact throughout to light touch.  - Coordination:  Finger-nose-finger and heel-knee-shin intact without dysmetria.   - Gait:   Normal steps, base, arm swing, and turning.  Heel and toe walking are normal.  Tandem gait is normal.      Radiology:  CT Head No Cont:  (21 Feb 2023 16:12)    < from: CT Head No Cont (02.21.23 @ 16:12) >  MPRESSION:    Unremarkable non-contrast CT scan of the brain.    < end of copied text >

## 2023-02-21 NOTE — ED PROVIDER NOTE - CONSIDERATION OF ADMISSION OBSERVATION
Work up in ED negative, although patient refused CTA and therefore work up incomplete. Neuro recommending obs for MRI due to patient's persistent symptoms. Consideration of Admission/Observation

## 2023-02-21 NOTE — ED PROVIDER NOTE - CLINICAL SUMMARY MEDICAL DECISION MAKING FREE TEXT BOX
Patient presented with slurred speech and R facial droop since yesterday. Patient seen at [facility] when symptoms began at which time patient had negative CT head but signed out AMA prior to completion of work up at that time. Otherwise on arrival patient afebrile, HD stable, but speech noted to be slurred and R facial droop appreciated. No other appreciable neuro deficits. Obtained labs which were grossly unremarkable including no significant leukocytosis, anemia, signs of dehydration/CANDICE, transaminitis or significant electrolyte abnormalities. Repeat CT head negative for emergent intracranial pathologies. Patient refused CTA head/neck and therefore unable to perform these. Consulted neuro who evaluated patient in the ED - recommending obs for MRI. Patient agreeable with plan.

## 2023-02-21 NOTE — ED PROVIDER NOTE - PROGRESS NOTE DETAILS
JULISSA: Neurology recommending obs for MRI as patient without deficits at this time. JULISSA: Patient refused CTA head/neck, neurology aware.

## 2023-02-21 NOTE — ED PROVIDER NOTE - OBJECTIVE STATEMENT
47 year old male with pmhx of CVA in the past s/p TPA, HTN presents to ed with slurred speech and R facial droop since yesterday at 4pm. Pt was seen at [facility] and had a normal ct head however left. no ha, dizziness, visual changes, abd pain, nausea, vomiting, diarrhea, chest pain or sob.

## 2023-02-21 NOTE — CONSULT NOTE ADULT - ASSESSMENT
46 yo male with PMHX CVA 2019 s/p TPA at [facility],  HTN compliant diagnosed at age 25 yo. Neurology consulted for above complaints. Patient presents with right facial droop and slurred speech started yesterday at 4pm. Collateral from wife and patient state patient woke up this morning at his base line with no complaints and went to work. Around 4 pm while leaving work he called his wife urged him to take an ambulance and went to [facility] they took a CTH and prescribed him aspirin and cholesterol medication. Patient left hospital AMA and came here for further management because his slurred speech and numbness on left face has not resolved. Upon presentation pt is AOX4 denies resent illness, no ear pain, difficulty swallowing,  n/v,  dizziness, no focal weakness to upper or lower extremity, denies change in taste, denies imbalance while walking. NIHSS of 1 right facial droop /95 HR 66 T 97.1 WBC wnl glucose wnl. CTH negative of acute stroke or bleed. Patient is currently not a candidate for IV thrombolytics. CTA H/N and CTP pending.      #[place] Palsy vs. Stroke    Suggestion:  CTA H/N  CTP Brain  MRI B w/o doug  ASA 81 mg  Atorvastaten 80 mg   Lipid profile, Hba1c, ace level, TSH, ESR  SLP  continue home medications  Valtrex 1 gram q tid x 7days  Risk stratifications on stroke prevention discussed with patient  Case discussed with      46 yo male with PMHX CVA 2019 s/p TPA at [facility],  HTN compliant diagnosed at age 23 yo. Neurology consulted for above complaints. Patient presents with right facial droop and slurred speech started yesterday at 4pm. Collateral from wife and patient state patient woke up this morning at his base line with no complaints and went to work. Around 4 pm while leaving work he called his wife urged him to take an ambulance and went to hospitals they took a CTH and prescribed him aspirin and cholesterol medication. Patient left hospital AMA and came here for further management because his slurred speech and numbness on left face has not resolved. Upon presentation pt is AOX4 denies resent illness, no ear pain, difficulty swallowing,  n/v,  dizziness, no focal weakness to upper or lower extremity, denies change in taste, denies imbalance while walking. NIHSS of 1 right facial droop /95 HR 66 T 97.1 WBC wnl glucose wnl. CTH negative of acute stroke or bleed. Patient is currently not a candidate for IV thrombolytics. CTA H/N and CTP pending.      #Early [place] Palsy vs. Stroke    Suggestion:  CTA H/N  CTP Brain  MRI B w/o doug  ASA 81 mg  Atorvastaten 80 mg   Lipid profile, Hba1c, ace level, TSH, ESR  SLP  continue home medications  Risk stratifications on stroke prevention discussed with patient  Case discussed with Dr. Newberry     46 yo male with PMHX CVA 2019 s/p TPA at [facility],  HTN compliant diagnosed at age 25 yo. Neurology consulted for above complaints. Patient presents with right facial droop and slurred speech started yesterday at 4pm. Collateral from wife and patient state patient woke up this morning at his base line with no complaints and went to work. Around 4 pm while leaving work he called his wife urged him to take an ambulance and went to [facility] they took a CTH and prescribed him aspirin and cholesterol medication. Patient left hospital AMA and came here for further management because his slurred speech and numbness on left face has not resolved. Upon presentation pt is AOX4 denies resent illness, no ear pain, difficulty swallowing,  n/v,  dizziness, no focal weakness to upper or lower extremity, denies change in taste, denies imbalance while walking. NIHSS of 1 right facial droop /95 HR 66 T 97.1 WBC wnl glucose wnl. CTH negative of acute stroke or bleed. Patient is currently not a candidate for IV thrombolytics. CTA H/N and CTP pending.      # [place] Palsy vs. Stroke    Suggestion:  CTA H/N  CTP Brain  MRI B w/o doug  ASA 81 mg  Atorvastaten 80 mg   Lipid profile, Hba1c, ace level, TSH, ESR  SLP  continue home medications  Risk stratifications on stroke prevention discussed with patient  Case discussed with Dr. Newberry     46 yo male with PMHX CVA 2019 s/p TPA at [facility],  HTN compliant diagnosed at age 25 yo. Neurology consulted for above complaints. Patient presents with right facial droop and slurred speech started yesterday at 4pm. Collateral from wife and patient state patient woke up this morning at his base line with no complaints and went to work. Around 4 pm while leaving work he called his wife urged him to take an ambulance and went [facility] they took a CTH and prescribed him aspirin and cholesterol medication. Patient left hospital AMA and came here for further management because his slurred speech and numbness on left face has not resolved. Upon presentation pt is AOX4 denies recent illness, no ear pain, difficulty swallowing,  n/v,  dizziness, no focal weakness to upper or lower extremity, denies change in taste, denies imbalance while walking. NIHSS of 1 right facial droop /95 HR 66 T 97.1 WBC wnl glucose wnl. CTH negative of acute stroke or bleed. Patient is currently not a candidate for IV thrombolytics. CTA H/N pending.      # [place] Palsy vs. Stroke    Suggestion:  CTA H/N  MRI B w/o doug  ASA 81 mg  Atorvastaten 80 mg   Lipid profile, Hba1c, ace level, TSH, ESR  SLP  continue home medications  Risk stratifications on stroke prevention discussed with patient  Case discussed with Dr. Newberry  Thank you for your consult.   46 yo male with PMHX CVA 2019 s/p TPA at [facility],  HTN compliant diagnosed at age 23 yo. Neurology consulted for above complaints. Patient presents with right facial droop and slurred speech started yesterday at 4pm. Collateral from wife and patient state patient woke up this morning at his base line with no complaints and went to work. Around 4 pm while leaving work he called his wife urged him to take an ambulance and went to [facility] they took a CTH and prescribed him aspirin and cholesterol medication. Patient left hospital AMA and came here for further management because his slurred speech and numbness on left face has not resolved. Upon presentation pt is AOX4 denies recent illness, no ear pain, difficulty swallowing,  n/v,  dizziness, no focal weakness to upper or lower extremity, denies change in taste, denies imbalance while walking. NIHSS of 1 right facial droop /95 HR 66 T 97.1 WBC wnl glucose wnl. CTH negative of acute stroke or bleed. Patient is currently not a candidate for IV thrombolytics. CTA H/N pending.      #Early onset of [place] Palsy vs. Stroke    Suggestion:  CTA H/N  MRI B w/o doug  ASA 81 mg  Atorvastaten 80 mg   Lipid profile, Hba1c, ace level, TSH, ESR  SLP  continue home medications  Risk stratifications on stroke prevention discussed with patient  Case discussed with Dr. Newberry  Thank you for your consult.   48 yo male with PMHX CVA 2019 s/p TPA at [facility],  HTN compliant diagnosed at age 25 yo. Neurology consulted for above complaints. Patient presents with right facial droop and slurred speech started yesterday at 4pm. Collateral from wife and patient state patient woke up this morning at his base line with no complaints and went to work. Around 4 pm while leaving work he called his wife urged him to take an ambulance and went to \[facility]\"" they took a CTH and prescribed him aspirin and cholesterol medication. Patient left hospital AMA and came here for further management because his slurred speech and numbness on left face has not resolved. Upon presentation pt is AOX4 denies recent illness, no ear pain, difficulty swallowing,  n/v,  dizziness, no focal weakness to upper or lower extremity, denies change in taste, denies imbalance while walking. NIHSS of 2 for right facial droop and slurred speech.  /95 HR 66 T 97.1 WBC wnl glucose wnl. CTH negative of acute stroke or bleed. Patient is out of the window for TNK and is currently not a candidate for IV thrombolytics. CTA H/N pending.      #Early onset of [place] Palsy vs. Stroke    Suggestion:  CTA H/N  MRI B w/o doug  ASA 81 mg  Atorvastaten 80 mg   Lipid profile, Hba1c, ace level, TSH, ESR  SLP  continue home medications  Risk stratifications on stroke prevention discussed with patient  Case discussed with Dr. Newberry  Thank you for your consult.   46 yo male with PMHX CVA 2019 s/p TPA at [facility],  HTN compliant diagnosed at age 25 yo. Patient presents with right facial droop and slurred speech started yesterday at 4pm and neurology was consulted. Collateral from wife and patient state patient woke up this morning at his base line with no complaints and went to work. Around 4 pm while leaving work he called his wife urged him to take an ambulance and went to [facility] they took a CTH and prescribed him aspirin and cholesterol medication. Patient left hospital AMA and came here for further management because his slurred speech and numbness on left face has not resolved. Upon presentation pt is AOX4 denies recent illness, no ear pain, difficulty swallowing,  n/v,  dizziness, no focal weakness to upper or lower extremity, denies change in taste, denies imbalance while walking. NIHSS of 2 for right facial droop and slurred speech.  /95 HR 66 T 97.1 WBC wnl glucose wnl. CTH negative of acute stroke or bleed. Patient is out of the window for TNK and is currently not a candidate for IV thrombolytics. CTA H/N shows mild stenosis right posterior cerebral artery.    #Early onset of [place] Palsy vs. Stroke    Suggestion:  MRI B w/o doug  ASA 81 mg  Atorvastaten 80 mg   Lipid profile, Hba1c, ace level, TSH, ESR  SLP  continue home medications  Risk stratifications on stroke prevention discussed with patient  Case discussed with Dr. Newberry  Thank you for your consult.   46 yo male with PMHX CVA 2019 s/p TPA at [facility],  HTN compliant diagnosed at age 25 yo. Patient presents with right facial droop and slurred speech started yesterday at 4pm and neurology was consulted. Collateral from wife and patient state patient woke up this morning at his base line with no complaints and went to work. Around 4 pm while leaving work he called his wife urged him to take an ambulance and went to hospitals they took a CTH and prescribed him aspirin and cholesterol medication. Patient left hospital AMA and came here for further management because his slurred speech and numbness on left face has not resolved. Upon presentation pt is AOX4 denies recent illness, no ear pain, difficulty swallowing,  n/v,  dizziness, no focal weakness to upper or lower extremity, denies change in taste, denies imbalance while walking. NIHSS of 2 for right facial droop and slurred speech.  /95 HR 66 T 97.1 WBC wnl glucose wnl. CTH negative of acute stroke or bleed. Patient is out of the window for TNK and is currently not a candidate for IV thrombolytics. CTA H/N shows mild stenosis right posterior cerebral artery.    #Early onset of [place] Palsy vs. Stroke    Suggestion:  MRI B w/o doug  TTE  ASA 81 mg  Atorvastaten 80 mg   Lipid profile, Hba1c, ace level, TSH, ESR  SLP  continue home medications  Risk stratifications on stroke prevention discussed with patient  Case discussed with Dr. Newberry  Thank you for your consult.   48 yo male with PMHX CVA 2019 s/p TPA at [facility],  HTN compliant diagnosed at age 23 yo. Patient presents with right facial droop and slurred speech started yesterday at 4pm and neurology was consulted. Collateral from wife and patient state patient woke up this morning at his base line with no complaints and went to work. Around 4 pm while leaving work he called his wife urged him to take an ambulance and went to [facility] they took a CTH and prescribed him aspirin and cholesterol medication. Patient left hospital AMA and came here for further management because his slurred speech and numbness on left face has not resolved. Upon presentation pt is AOX4 denies recent illness, no ear pain, difficulty swallowing,  n/v,  dizziness, no focal weakness to upper or lower extremity, denies change in taste, denies imbalance while walking. NIHSS of 2 for right facial droop and slurred speech.  /95 HR 66 T 97.1 WBC wnl glucose wnl. CTH negative of acute stroke or bleed. Patient is out of the window for TNK and is currently not a candidate for IV thrombolytics. CTA H/N shows mild stenosis right posterior cerebral artery.    #Early onset of [place] Palsy vs. Stroke    Suggestion:  MRI B w/o doug  TTE  ASA 81 mg  Atorvastaten 80 mg   Lipid profile, Hba1c, ace level, TSH, ESR  Hypercoagulable panel  SLP  continue home medications  Risk stratifications on stroke prevention discussed with patient  Case discussed with Dr. Newberry  Thank you for your consult.

## 2023-02-21 NOTE — CONSULT NOTE ADULT - ATTENDING COMMENTS
"See imported Sleep Study result in "Chart Review" under the "Media tab".     (This Sleep Study was interpreted by a Board Certified Sleep Specialist who conducted an epoch-by-epoch review of the entire raw data recording.)    (The indication for this sleep study was reviewed and deemed appropriate by [facility] Practice Parameters or other reasons by a Board Certified Sleep Specialist.)    " Patient seen and examined and agree with above except as noted.  Patients history, notes ,labs, imaging, vitals and meds reviewed personally.  Developed right facial and dysarthria  Exam shows right facial, mild dysarthria and slight weakness in RUE and RLE proximally 5-/5  NIHSS 2  mrankin 0    Plan as above  If acute stroke admit to stroke unit for workup

## 2023-02-21 NOTE — ED PROVIDER NOTE - NS ED ATTENDING STATEMENT MOD
This was a shared visit with the NAMITA. I reviewed and verified the documentation and independently performed the documented:

## 2023-02-22 DIAGNOSIS — G46.4 CEREBELLAR STROKE SYNDROME: ICD-10-CM

## 2023-02-22 LAB
A1C WITH ESTIMATED AVERAGE GLUCOSE RESULT: 5.2 % — SIGNIFICANT CHANGE UP (ref 4–5.6)
CHOLEST SERPL-MCNC: 157 MG/DL — SIGNIFICANT CHANGE UP
ESTIMATED AVERAGE GLUCOSE: 103 MG/DL — SIGNIFICANT CHANGE UP (ref 68–114)
HDLC SERPL-MCNC: 61 MG/DL — SIGNIFICANT CHANGE UP
LIPID PNL WITH DIRECT LDL SERPL: 88 MG/DL — SIGNIFICANT CHANGE UP
NON HDL CHOLESTEROL: 96 MG/DL — SIGNIFICANT CHANGE UP
SARS-COV-2 RNA SPEC QL NAA+PROBE: SIGNIFICANT CHANGE UP
TRIGL SERPL-MCNC: 45 MG/DL — SIGNIFICANT CHANGE UP

## 2023-02-22 PROCEDURE — 80053 COMPREHEN METABOLIC PANEL: CPT

## 2023-02-22 PROCEDURE — 81241 F5 GENE: CPT

## 2023-02-22 PROCEDURE — 83695 ASSAY OF LIPOPROTEIN(A): CPT

## 2023-02-22 PROCEDURE — 83090 ASSAY OF HOMOCYSTEINE: CPT

## 2023-02-22 PROCEDURE — 86255 FLUORESCENT ANTIBODY SCREEN: CPT

## 2023-02-22 PROCEDURE — 86147 CARDIOLIPIN ANTIBODY EA IG: CPT

## 2023-02-22 PROCEDURE — 86200 CCP ANTIBODY: CPT

## 2023-02-22 PROCEDURE — 85613 RUSSELL VIPER VENOM DILUTED: CPT

## 2023-02-22 PROCEDURE — 85730 THROMBOPLASTIN TIME PARTIAL: CPT

## 2023-02-22 PROCEDURE — 82595 ASSAY OF CRYOGLOBULIN: CPT

## 2023-02-22 PROCEDURE — 85300 ANTITHROMBIN III ACTIVITY: CPT

## 2023-02-22 PROCEDURE — 86803 HEPATITIS C AB TEST: CPT

## 2023-02-22 PROCEDURE — 70496 CT ANGIOGRAPHY HEAD: CPT | Mod: 26,MA

## 2023-02-22 PROCEDURE — 86140 C-REACTIVE PROTEIN: CPT

## 2023-02-22 PROCEDURE — 81240 F2 GENE: CPT

## 2023-02-22 PROCEDURE — 92523 SPEECH SOUND LANG COMPREHEN: CPT | Mod: GN

## 2023-02-22 PROCEDURE — 85306 CLOT INHIBIT PROT S FREE: CPT

## 2023-02-22 PROCEDURE — 97162 PT EVAL MOD COMPLEX 30 MIN: CPT | Mod: GP

## 2023-02-22 PROCEDURE — 99222 1ST HOSP IP/OBS MODERATE 55: CPT

## 2023-02-22 PROCEDURE — 70551 MRI BRAIN STEM W/O DYE: CPT | Mod: 26,MA

## 2023-02-22 PROCEDURE — 36415 COLL VENOUS BLD VENIPUNCTURE: CPT

## 2023-02-22 PROCEDURE — 84443 ASSAY THYROID STIM HORMONE: CPT

## 2023-02-22 PROCEDURE — 70498 CT ANGIOGRAPHY NECK: CPT | Mod: 26,MA

## 2023-02-22 PROCEDURE — 97166 OT EVAL MOD COMPLEX 45 MIN: CPT | Mod: GO

## 2023-02-22 PROCEDURE — 85303 CLOT INHIBIT PROT C ACTIVITY: CPT

## 2023-02-22 PROCEDURE — 85027 COMPLETE CBC AUTOMATED: CPT

## 2023-02-22 PROCEDURE — 86146 BETA-2 GLYCOPROTEIN ANTIBODY: CPT

## 2023-02-22 PROCEDURE — 86235 NUCLEAR ANTIGEN ANTIBODY: CPT

## 2023-02-22 PROCEDURE — 92610 EVALUATE SWALLOWING FUNCTION: CPT | Mod: GN

## 2023-02-22 PROCEDURE — 86038 ANTINUCLEAR ANTIBODIES: CPT

## 2023-02-22 PROCEDURE — 85652 RBC SED RATE AUTOMATED: CPT

## 2023-02-22 RX ORDER — HEPARIN SODIUM 5000 [USP'U]/ML
5000 INJECTION INTRAVENOUS; SUBCUTANEOUS EVERY 8 HOURS
Refills: 0 | Status: DISCONTINUED | OUTPATIENT
Start: 2023-02-22 | End: 2023-02-24

## 2023-02-22 RX ORDER — HYDROCHLOROTHIAZIDE 25 MG
1 TABLET ORAL
Qty: 0 | Refills: 0 | DISCHARGE

## 2023-02-22 RX ORDER — ESZOPICLONE 2 MG/1
1 TABLET, COATED ORAL
Qty: 0 | Refills: 0 | DISCHARGE

## 2023-02-22 RX ORDER — CLOPIDOGREL BISULFATE 75 MG/1
75 TABLET, FILM COATED ORAL DAILY
Refills: 0 | Status: DISCONTINUED | OUTPATIENT
Start: 2023-02-22 | End: 2023-02-24

## 2023-02-22 RX ORDER — ASPIRIN/CALCIUM CARB/MAGNESIUM 324 MG
81 TABLET ORAL DAILY
Refills: 0 | Status: DISCONTINUED | OUTPATIENT
Start: 2023-02-22 | End: 2023-02-24

## 2023-02-22 RX ADMIN — HEPARIN SODIUM 5000 UNIT(S): 5000 INJECTION INTRAVENOUS; SUBCUTANEOUS at 13:51

## 2023-02-22 RX ADMIN — Medication 81 MILLIGRAM(S): at 00:15

## 2023-02-22 NOTE — ED ADULT NURSE REASSESSMENT NOTE - NS ED NURSE REASSESS COMMENT FT1
Pt. received from previous RN in the front. Pt. lying on stretcher, breathing with ease on RA. A&O x4. 18g noted to the L AC; IV intact, no redness/swelling at site. Pt. OBS.

## 2023-02-22 NOTE — CONSULT NOTE ADULT - ASSESSMENT
IMPRESSION: Rehab of L CVA / dysarthria, right facial droop / HTN                      High functioning   PRECAUTIONS: [   ] Cardiac  [   ] Respiratory  [   ] Seizures [   ] Contact Isolation  [   ] Droplet Isolation  [   ] Other    Weight Bearing Status:     RECOMMENDATION:    Out of Bed to Chair     DVT/Decubiti Prophylaxis    REHAB PLAN:     [  x  ] Bedside P/T 3-5 times a week   [  x  ]   Bedside O/T  2-3 times a week             [ x   ] Speech Therapy               [    ]  No Rehab Therapy Indicated   Conditioning/ROM                                    ADL  Bed Mobility                                               Conditioning/ROM  Transfers                                                     Bed Mobility  Sitting /Standing Balance                         Transfers                                        Gait Training                                               Sitting/Standing Balance  Stair Training [   ]Applicable                    Home equipment Eval                                                                        Splinting  [   ] Only      GOALS:   ADL   [ x   ]   Independent                    Transfers  [   x ] Independent                          Ambulation  [x    ] Independent     [  x   ] With device                            [    ]  CG                                                         [    ]  CG                                                                  [    ] CG                            [    ] Min A                                                   [    ] Min A                                                              [    ] Min  A          DISCHARGE PLAN:   [    ]  Good candidate for Intensive Rehabilitation/Hospital based                                             Will tolerate 3hrs Intensive Rehab Daily                                       [     ]  Short Term Rehab in Skilled Nursing Facility                                       [  x   ]  Home with Outpatient or VN services                                         [     ]  Possible Candidate for Intensive Hospital based Rehab

## 2023-02-22 NOTE — H&P ADULT - ASSESSMENT
46 yo male with PMHX CVA 2019 s/p TPA at [facility],  HTN compliant diagnosed at age 25 yo. Patient presents with right facial droop and slurred speech started yesterday at 4pm and neurology was consulted. Collateral from wife and patient state patient woke up this morning at his base line with no complaints and went to work. Around 4 pm while leaving work he called his wife urged him to take an ambulance and went to [facility] they took a CTH and prescribed him aspirin and cholesterol medication. Patient left hospital AMA and came here for further management because his slurred speech and numbness on left face has not resolved. Upon presentation pt is AOX4 denies recent illness, no ear pain, difficulty swallowing,  n/v,  dizziness, no focal weakness to upper or lower extremity, denies change in taste, denies imbalance while walking. NIHSS of 2 for right facial droop and slurred speech.  /95 HR 66 T 97.1 WBC wnl glucose wnl. CTH negative of acute stroke or bleed. Patient is out of the window for TNK and is currently not a candidate for IV thrombolytics. CTA H/N shows mild stenosis right posterior cerebral artery. MR done and showed acute stroke in the Lt basal ganglia/MCA territory     Neuro  #Stroke workup  - continue aspirin 81mg and plavix 75mg daily  - continue atorvastatin 80mg daily  - q4hr stroke neuro checks and vitals  - Stroke education  - Vasculitic work up   - Hypercoagulable profile     Cards  #HTN  - permissive hypertension, Goal -180  - hold home blood pressure medication for now  - obtain TTE with bubble      #HLD  - high dose statin as above in CVA  - LDL results: 88    Pulm  - call provider if SPO2 < 94%    GI  #Nutrition/Fluids/Electrolytes   - replete K<4 and Mg <2  - Diet: DASH after swallow test   - IVF: none    Renal  - daily BMP      Endocrine  # A1C results: pending     - TSH results: pending     DVT Prophylaxis  - Heparin 5000 sq for DVT prophylaxis   - SCDs for DVT prophylaxis          Discussed daily hospital plans and goals with patient and family at bedside.     Discussed with Neurology Attending

## 2023-02-22 NOTE — SWALLOW BEDSIDE ASSESSMENT ADULT - SLP PERTINENT HISTORY OF CURRENT PROBLEM
46 yo male with PMH CVA 2019 s/p TPA at [facility],  HTN compliant diagnosed at age 23 yo. Patient presents with right facial droop and slurred speech that started yesterday at 4pm and went to \[facility]\"" where he took a CTH and was prescribed aspirin and cholesterol medication. Patient left hospital AMA and came here for further management for unresolved slurred speech and numbness. NIHSS of 2 for right facial droop and slurred speech. CTH negative, out of window for TNK. CTA H/N shows mild stenosis right posterior cerebral artery. MRI head -->acute infarct in the left posterior limb of the internal capsule. Chronic lacunar infarcts involving the right thalamus and the chelly.

## 2023-02-22 NOTE — ED CDU PROVIDER INITIAL DAY NOTE - CLINICAL SUMMARY MEDICAL DECISION MAKING FREE TEXT BOX
Patient presetns with slurred speech and facial droop. Symptoms have been >24 hours. labs imaging done, no acute findings. Seen by neurology who recommends obs for MRI.

## 2023-02-22 NOTE — SWALLOW BEDSIDE ASSESSMENT ADULT - COMMENTS
Pt. is Known to ST previous admission 1/6/19 with recs for a regular diet w/ thin liquids. Pt. is Known to ST previous admission 6/5/19 with recs for a regular diet w/ thin liquids.

## 2023-02-22 NOTE — ED CDU PROVIDER INITIAL DAY NOTE - ATTENDING APP SHARED VISIT CONTRIBUTION OF CARE
48 yo M pmh of CVA 2 years ago with residual right sided weakness, HTN presents with slurred speech. Patient reports that for the last 2 days he has been having slurred speech and left facial droop. States that he never had similar symptoms in the past. no headaches or dizziness. no chest pain, no shortness of breath, no palpitations. no n/v, no abdominal pain. no hx of smoking.     CONSTITUTIONAL: Well-developed; well-nourished; in no acute distress.   SKIN: warm, dry  HEAD: Normocephalic; atraumatic.  EYES: PERRL, EOMI, no conjunctival erythema  ENT: No nasal discharge; airway clear.  NECK: Supple; non tender.  CARD: S1, S2 normal;  Regular rate and rhythm.   RESP: No wheezes, rales or rhonchi.  ABD: soft non tender, non distended, no rebound or guarding  EXT: Normal ROM.  5/5 strength in all 4 extremities   LYMPH: No acute cervical adenopathy.  NEURO: + slurred speech, + left facial droop, 5/5 strength in all extremities, equal sensation bilaterally, no hand drift.   PSYCH: Cooperative, appropriate.

## 2023-02-22 NOTE — ED CDU PROVIDER INITIAL DAY NOTE - PROGRESS NOTE DETAILS
Spoke with neurology regarding to the mild stenosis finding from the CTA and was told that no intervention needed. patient resting. MRI showed acute infarct, neurology contacted.

## 2023-02-22 NOTE — H&P ADULT - NSHPPHYSICALEXAM_GEN_ALL_CORE
General: Well-developed, well nourished, in no acute distress.  Eyes: Conjunctiva and sclera clear.  Neurologic:  - Mental Status:  Alert, awake, oriented to person, place, and time; speech slurred   Good overall fund of knowledge; Able to read and write a sentence.  - Cranial Nerves II-XII:    II:  Visual fields are full to confrontation; Pupils are equal, round, and reactive to light.  III, IV, VI:  Extraocular movements are intact without nystagmus.  V:  Facial sensation is intact in the V1-V3 distribution bilaterally.  VII:  Face is asymmetric slight right facial droop with normal eye closure and smile  VIII:  Hearing is grossly intact to finger rub.  IX, X:  Uvula is midline and soft palate rises symmetrically  XI:  Head turning and shoulder shrug are intact.  XII:  Tongue protudes in the midline.  - Motor:  Strength is 5/5 throughout.  There is no pronator drift.  Normal muscle bulk and tone throughout.  - Reflexes:  2+ and symmetric at the biceps, triceps, brachioradialis, knees, and ankles.  Plantar responses flexor.  - Sensory:  Intact throughout to light touch.  - Coordination:  Finger-nose-finger and heel-knee-shin intact without dysmetria.   - Gait: Normal steps, base, arm swing, and turning.  Heel and toe walking are normal.  Tandem gait is normal. General: Well-developed, well nourished, in no acute distress.  Eyes: Conjunctiva and sclera clear.  Neurologic:  - Mental Status:  Alert, awake, oriented to person, place, and time; speech slurred   Good overall fund of knowledge; Able to read and write a sentence.  - Cranial Nerves II-XII:    II:  Visual fields are full to confrontation; Pupils are equal, round, and reactive to light. Arcus senilis bilaterally   III, IV, VI:  Extraocular movements are intact without nystagmus.  V:  Facial sensation is intact in the V1-V3 distribution bilaterally.  VII:  Face is asymmetric slight right facial droop with normal eye closure and smile  VIII:  Hearing is grossly intact to finger rub.  IX, X:  Uvula is midline and soft palate rises symmetrically  XI:  Head turning and shoulder shrug are intact.  XII:  Tongue protudes in the midline.  - Motor:  Strength is 5/5 throughout.  There is no pronator drift.  Normal muscle bulk and tone throughout.  - Reflexes:  2+ and symmetric at the biceps, triceps, brachioradialis, knees, and ankles.  Plantar responses flexor.  - Sensory:  Intact throughout to light touch.  - Coordination:  Finger-nose-finger and heel-knee-shin intact without dysmetria.   - Gait: Normal steps, base, arm swing, and turning.  Heel and toe walking are normal.  Tandem gait is normal.

## 2023-02-22 NOTE — ED CDU PROVIDER DISPOSITION NOTE - CLINICAL COURSE
Patient presetns with slurred speech and facial droop. Symptoms have been >24 hours. labs imaging done, no acute findings. Seen by neurology who recommends obs for MRI. MRI positive for acute stroke. Neurology aware, admitted to stroke unit for further management.

## 2023-02-22 NOTE — H&P ADULT - NSHPLABSRESULTS_GEN_ALL_CORE
LABS:  cret                        11.5   5.05  )-----------( 159      ( 21 Feb 2023 16:40 )             37.7     02-21    141  |  104  |  11  ----------------------------<  104<H>  3.7   |  27  |  0.9    Ca    9.5      21 Feb 2023 16:40    TPro  7.9  /  Alb  4.5  /  TBili  2.0<H>  /  DBili  x   /  AST  19  /  ALT  14  /  AlkPhos  82  02-21    PT/INR - ( 21 Feb 2023 16:39 )   PT: 12.80 sec;   INR: 1.12 ratio         PTT - ( 21 Feb 2023 16:39 )  PTT:34.4 sec

## 2023-02-22 NOTE — ED CDU PROVIDER INITIAL DAY NOTE - OBJECTIVE STATEMENT
47 year old male with pmhx of CVA in the past s/p TPA, HTN presents to ed with slurred speech and R facial droop since yesterday at 4pm. Pt was seen at [facility] and had a normal ct head however left. no ha, dizziness, visual changes, abd pain, nausea, vomiting, diarrhea, chest pain or sob.

## 2023-02-22 NOTE — CONSULT NOTE ADULT - SUBJECTIVE AND OBJECTIVE BOX
HPI:  46 yo male with PMHX CVA 2019 s/p TPA at [facility],  HTN compliant diagnosed at age 25 yo. Patient presents with right facial droop and slurred speech started yesterday at 4pm and neurology was consulted. Collateral from wife and patient state patient woke up this morning at his base line with no complaints and went to work. Around 4 pm while leaving work he called his wife urged him to take an ambulance and went to [facility] CTH was done (does not have record of results) and prescribed him aspirin and cholesterol medication. Patient left hospital AMA and came here for further management because his slurred speech and numbness on left face has not resolved.     CT Head No Cont:  (21 Feb 2023 16:12)    < from: CT Head No Cont (02.21.23 @ 16:12) >  MPRESSION:    Unremarkable non-contrast CT scan of the brain.    < end of copied text >    CTH negative of acute stroke or bleed. Patient is out of the window for TNK and is currently not a candidate for IV thrombolytics. CTA H/N shows mild stenosis right posterior cerebral artery. MR done and showed acute stroke in the Lt basal ganglia / MCA territory     Medical charts / labs / imaging studies reviewed       PAST MEDICAL & SURGICAL HISTORY:  HTN (hypertension)      Stroke  3 weeks ago      No significant past surgical history          Hospital Course:    TODAY'S SUBJECTIVE & REVIEW OF SYMPTOMS:     Constitutional WNL   Cardio WNL   Resp WNL   GI WNL  Heme WNL  Endo WNL  Skin WNL  MSK WNL  Neuro right facial droop / dysarthria   Cognitive WNL  Psych WNL      MEDICATIONS  (STANDING):  aspirin enteric coated 81 milliGRAM(s) Oral daily  atorvastatin 80 milliGRAM(s) Oral at bedtime  clopidogrel Tablet 75 milliGRAM(s) Oral daily  heparin   Injectable 5000 Unit(s) SubCutaneous every 8 hours    MEDICATIONS  (PRN):      FAMILY HISTORY:  FH: type 2 diabetes        Allergies    No Known Allergies    Intolerances        SOCIAL HISTORY:    [  ] Etoh  [  ] Smoking  [  ] Substance abuse     Home Environment:  [   ] Home Alone  [ x  ] Lives with Family  [   ] Home Health Aid    Dwelling:  [   ] Apartment  [ x  ] Private House  [   ] Adult Home  [   ] Skilled Nursing Facility      [   ] Short Term  [   ] Long Term  [ x  ] Stairs       Elevator [   ]    FUNCTIONAL STATUS PTA: (Check all that apply)  Ambulation: [ x   ]Independent    [   ] Dependent     [   ] Non-Ambulatory  Assistive Device: [   ] SA Cane  [   ]  Q Cane  [   ] Walker  [   ]  Wheelchair  ADL : [ x  ] Independent  [    ]  Dependent       Vital Signs Last 24 Hrs  T(C): 36.2 (22 Feb 2023 15:57), Max: 36.2 (22 Feb 2023 15:57)  T(F): 97.2 (22 Feb 2023 15:57), Max: 97.2 (22 Feb 2023 15:57)  HR: 66 (22 Feb 2023 15:57) (66 - 66)  BP: 165/103 (22 Feb 2023 15:57) (165/103 - 165/103)  BP(mean): --  RR: 18 (22 Feb 2023 15:57) (18 - 18)  SpO2: 100% (22 Feb 2023 15:57) (100% - 100%)    Parameters below as of 22 Feb 2023 15:57  Patient On (Oxygen Delivery Method): room air          PHYSICAL EXAM: Awake & Alert  GENERAL: NAD  HEAD:  Normocephalic  CHEST/LUNG: Clear   HEART: S1S2+  ABDOMEN: Soft, Nontender  EXTREMITIES:  no calf tenderness    NERVOUS SYSTEM:  Cranial Nerves 2-12 intact [   ] Abnormal  [ x  ]right facial droop / dysarthria   ROM: WFL all extremities [ x  ]  Abnormal [   ]  Motor Strength: WFL all extremities  [ x  ]  Abnormal [   ]  Sensation: intact to light touch [ x  ] Abnormal [   ]    FUNCTIONAL STATUS:  Bed Mobility: Independent [   ]  Supervision [  x ]  Needs Assistance [   ]  N/A [   ]  Transfers: Independent [   ]  Supervision [  x ]  Needs Assistance [   ]  N/A [   ]   Ambulation: Independent [   ]  Supervision [ x  ]  Needs Assistance [   ]  N/A [   ]  ADL: Independent [   ] Requires Assistance [   ] N/A [   ]      LABS:                        11.5   5.05  )-----------( 159      ( 21 Feb 2023 16:40 )             37.7     02-21    141  |  104  |  11  ----------------------------<  104<H>  3.7   |  27  |  0.9    Ca    9.5      21 Feb 2023 16:40    TPro  7.9  /  Alb  4.5  /  TBili  2.0<H>  /  DBili  x   /  AST  19  /  ALT  14  /  AlkPhos  82  02-21    PT/INR - ( 21 Feb 2023 16:39 )   PT: 12.80 sec;   INR: 1.12 ratio         PTT - ( 21 Feb 2023 16:39 )  PTT:34.4 sec      RADIOLOGY & ADDITIONAL STUDIES:

## 2023-02-22 NOTE — ED CDU PROVIDER INITIAL DAY NOTE - NS ED ROS FT
CONST: No fever, chills or bodyaches  EYES: No pain, redness, drainage or visual changes.  ENT: No ear pain or discharge, nasal discharge or congestion. No sore throat  CARD: No chest pain, palpitations  RESP: No SOB, cough, hemoptysis. No hx of asthma or COPD  GI: No abdominal pain, N/V/D  MS: No joint pain, back pain or extremity pain/injury  SKIN: No rashes  NEURO: No headache, dizziness, paresthesias or LOC; slurred speech and R facial droop

## 2023-02-23 LAB
AT III ACT/NOR PPP CHRO: 110 % — SIGNIFICANT CHANGE UP (ref 85–135)
HCYS SERPL-MCNC: 11.4 UMOL/L — SIGNIFICANT CHANGE UP

## 2023-02-23 PROCEDURE — 99232 SBSQ HOSP IP/OBS MODERATE 35: CPT

## 2023-02-23 RX ADMIN — HEPARIN SODIUM 5000 UNIT(S): 5000 INJECTION INTRAVENOUS; SUBCUTANEOUS at 00:28

## 2023-02-23 RX ADMIN — Medication 81 MILLIGRAM(S): at 11:39

## 2023-02-23 RX ADMIN — ATORVASTATIN CALCIUM 80 MILLIGRAM(S): 80 TABLET, FILM COATED ORAL at 00:24

## 2023-02-23 RX ADMIN — HEPARIN SODIUM 5000 UNIT(S): 5000 INJECTION INTRAVENOUS; SUBCUTANEOUS at 14:33

## 2023-02-23 RX ADMIN — ATORVASTATIN CALCIUM 80 MILLIGRAM(S): 80 TABLET, FILM COATED ORAL at 21:33

## 2023-02-23 RX ADMIN — HEPARIN SODIUM 5000 UNIT(S): 5000 INJECTION INTRAVENOUS; SUBCUTANEOUS at 08:26

## 2023-02-23 RX ADMIN — CLOPIDOGREL BISULFATE 75 MILLIGRAM(S): 75 TABLET, FILM COATED ORAL at 11:40

## 2023-02-23 RX ADMIN — HEPARIN SODIUM 5000 UNIT(S): 5000 INJECTION INTRAVENOUS; SUBCUTANEOUS at 21:33

## 2023-02-23 NOTE — OCCUPATIONAL THERAPY INITIAL EVALUATION ADULT - GENERAL OBSERVATIONS, REHAB EVAL
pt received semi alexander on stretcher in ED, +tele, +pulse oxi, agreeable to OT evaluation, left semi alexander on stretcher vitals stable, Rn aware

## 2023-02-23 NOTE — OCCUPATIONAL THERAPY INITIAL EVALUATION ADULT - ADL RETRAINING, OT EVAL
Patient will perform bathing independently with use of appropriate adaptive equipment and/or DME by discharge..

## 2023-02-23 NOTE — OCCUPATIONAL THERAPY INITIAL EVALUATION ADULT - THERAPY FREQUENCY, OT EVAL
pt to be seen for 1 follow up to assess higher level IADL tasks and verify no worsening of symptoms/1-2x/week

## 2023-02-23 NOTE — OCCUPATIONAL THERAPY INITIAL EVALUATION ADULT - NS ASR FOLLOW COMMAND OT EVAL
pt with mild slurred speech with noted +facial droop L side with pt reporting mild numbness to L side of face/100% of the time

## 2023-02-23 NOTE — OCCUPATIONAL THERAPY INITIAL EVALUATION ADULT - PERTINENT HX OF CURRENT PROBLEM, REHAB EVAL
48 yo male with PMHX CVA 2019 s/p TPA at [facility],  HTN compliant diagnosed at age 25 yo. Patient presents with right facial droop and slurred speech. He was not a candidate for TNK or thrombectomy. CTH, and CTA as reported above. MR done and showed acute stroke in the Lt basal ganglia/MCA territory

## 2023-02-24 ENCOUNTER — TRANSCRIPTION ENCOUNTER (OUTPATIENT)
Age: 48
End: 2023-02-24

## 2023-02-24 VITALS
TEMPERATURE: 97 F | DIASTOLIC BLOOD PRESSURE: 99 MMHG | SYSTOLIC BLOOD PRESSURE: 164 MMHG | OXYGEN SATURATION: 98 % | RESPIRATION RATE: 18 BRPM | HEART RATE: 60 BPM

## 2023-02-24 LAB
ALBUMIN SERPL ELPH-MCNC: 4.2 G/DL — SIGNIFICANT CHANGE UP (ref 3.5–5.2)
ALP SERPL-CCNC: 81 U/L — SIGNIFICANT CHANGE UP (ref 30–115)
ALT FLD-CCNC: 10 U/L — SIGNIFICANT CHANGE UP (ref 0–41)
ANION GAP SERPL CALC-SCNC: 8 MMOL/L — SIGNIFICANT CHANGE UP (ref 7–14)
AST SERPL-CCNC: 13 U/L — SIGNIFICANT CHANGE UP (ref 0–41)
BILIRUB SERPL-MCNC: 1.6 MG/DL — HIGH (ref 0.2–1.2)
BUN SERPL-MCNC: 12 MG/DL — SIGNIFICANT CHANGE UP (ref 10–20)
CALCIUM SERPL-MCNC: 9.5 MG/DL — SIGNIFICANT CHANGE UP (ref 8.4–10.5)
CHLORIDE SERPL-SCNC: 102 MMOL/L — SIGNIFICANT CHANGE UP (ref 98–110)
CO2 SERPL-SCNC: 29 MMOL/L — SIGNIFICANT CHANGE UP (ref 17–32)
CREAT SERPL-MCNC: 1 MG/DL — SIGNIFICANT CHANGE UP (ref 0.7–1.5)
CRP SERPL-MCNC: <3 MG/L — SIGNIFICANT CHANGE UP
EGFR: 93 ML/MIN/1.73M2 — SIGNIFICANT CHANGE UP
ERYTHROCYTE [SEDIMENTATION RATE] IN BLOOD: 3 MM/HR — SIGNIFICANT CHANGE UP (ref 0–10)
GLUCOSE SERPL-MCNC: 123 MG/DL — HIGH (ref 70–99)
HCT VFR BLD CALC: 38 % — LOW (ref 42–52)
HCV AB S/CO SERPL IA: 0.04 COI — SIGNIFICANT CHANGE UP
HCV AB SERPL-IMP: SIGNIFICANT CHANGE UP
HGB BLD-MCNC: 11.8 G/DL — LOW (ref 14–18)
MCHC RBC-ENTMCNC: 19.2 PG — LOW (ref 27–31)
MCHC RBC-ENTMCNC: 31.1 G/DL — LOW (ref 32–37)
MCV RBC AUTO: 62 FL — LOW (ref 80–94)
NRBC # BLD: 0 /100 WBCS — SIGNIFICANT CHANGE UP (ref 0–0)
PLATELET # BLD AUTO: 163 K/UL — SIGNIFICANT CHANGE UP (ref 130–400)
POTASSIUM SERPL-MCNC: 3.5 MMOL/L — SIGNIFICANT CHANGE UP (ref 3.5–5)
POTASSIUM SERPL-SCNC: 3.5 MMOL/L — SIGNIFICANT CHANGE UP (ref 3.5–5)
PROT SERPL-MCNC: 7.3 G/DL — SIGNIFICANT CHANGE UP (ref 6–8)
RBC # BLD: 6.13 M/UL — HIGH (ref 4.7–6.1)
RBC # FLD: 19 % — HIGH (ref 11.5–14.5)
SODIUM SERPL-SCNC: 139 MMOL/L — SIGNIFICANT CHANGE UP (ref 135–146)
TSH SERPL-MCNC: 1.08 UIU/ML — SIGNIFICANT CHANGE UP (ref 0.27–4.2)
WBC # BLD: 4.98 K/UL — SIGNIFICANT CHANGE UP (ref 4.8–10.8)
WBC # FLD AUTO: 4.98 K/UL — SIGNIFICANT CHANGE UP (ref 4.8–10.8)

## 2023-02-24 PROCEDURE — 99231 SBSQ HOSP IP/OBS SF/LOW 25: CPT

## 2023-02-24 PROCEDURE — G0452: CPT | Mod: 26

## 2023-02-24 PROCEDURE — 99239 HOSP IP/OBS DSCHRG MGMT >30: CPT

## 2023-02-24 RX ORDER — AMLODIPINE BESYLATE 2.5 MG/1
1 TABLET ORAL
Qty: 30 | Refills: 0
Start: 2023-02-24 | End: 2023-03-25

## 2023-02-24 RX ORDER — ASPIRIN/CALCIUM CARB/MAGNESIUM 324 MG
1 TABLET ORAL
Qty: 30 | Refills: 0
Start: 2023-02-24 | End: 2023-03-25

## 2023-02-24 RX ORDER — CLOPIDOGREL BISULFATE 75 MG/1
1 TABLET, FILM COATED ORAL
Qty: 19 | Refills: 0
Start: 2023-02-24 | End: 2023-03-14

## 2023-02-24 RX ADMIN — Medication 81 MILLIGRAM(S): at 11:53

## 2023-02-24 RX ADMIN — CLOPIDOGREL BISULFATE 75 MILLIGRAM(S): 75 TABLET, FILM COATED ORAL at 11:52

## 2023-02-24 RX ADMIN — HEPARIN SODIUM 5000 UNIT(S): 5000 INJECTION INTRAVENOUS; SUBCUTANEOUS at 05:55

## 2023-02-24 NOTE — SPEECH LANGUAGE PATHOLOGY EVALUATION - SLP PATIENT/FAMILY GOALS STATEMENT
Pt educated on motor speech strategies of overarticulation, think loud,and slowed pace. Pt provided with educational brochusres regarding o/p services. Dr. Sanchez to write orders for ST for o/p .

## 2023-02-24 NOTE — DISCHARGE NOTE PROVIDER - HOSPITAL COURSE
HPI:  48 yo male with PMHX CVA 2019 s/p TPA at [facility],  HTN compliant diagnosed at age 23 yo. Patient presents with right facial droop and slurred speech started yesterday at 4pm and neurology was consulted. Collateral from wife and patient state patient woke up this morning at his base line with no complaints and went to work. Around 4 pm while leaving work he called his wife urged him to take an ambulance and went to [facility] CTH was done (does not have record of results) and prescribed him aspirin and cholesterol medication. Patient left hospital AMA and came here for further management because his slurred speech and numbness on left face has not resolved. (22 Feb 2023 12:53)    MR Head No Cont (02.22.23 @ 09:19) > Focal acute infarct involving the left posterior limb of the internal capsule. No evidence of hemorrhage. Chronic lacunar infarcts involving the right thalamus as well as the chelly as well as mild chronic microvascular type changes.    Mr. Fatima was managed by the Neurovascular team for 3 days w/ MRI findings of acute left IC infarct. other than HTN, all other risk factors were negative. He had a normal TTE in the past. Due to childcare complications he was unable to complete cardiac images w/ updated echo. He was referred to the cardiac Map clinic as well as Medicine MAP clinic for Echo and established care. He was started on secondary stroke prevention and will f/u in the stroke clinic 2-3 weeks.       Neurologic:  -Mental status: Awake, alert, oriented to person, place, and time. Speech is fluent with intact naming, repetition, and comprehension, mild dysarthria.   -Cranial nerves:   II: Visual fields are full to confrontation.  III, IV, VI: Extraocular movements are intact without nystagmus. Pupils equally round and reactive to light  V:  Facial sensation V1-V3 equal and intact   VII: Right Face is asymmetric   VIII: Hearing is bilaterally intact to finger rub  IX, X: Uvula is midline and soft palate rises symmetrically  XI: Head turning and shoulder shrug are intact.  XII: Tongue protrudes midline  Motor: Normal bulk and tone. No pronator drift. Strength bilateral upper extremity 5/5, bilateral lower extremities 5/5.  Rapid alternating movements intact and symmetric  Sensation: Intact to light touch bilaterally. No neglect or extinction on double simultaneous testing.  Coordination: No dysmetria on finger-to-nose and heel-to-shin bilaterally  Reflexes: Downgoing toes bilaterally, 2+ biceps, triceps, Knees, achilles   Gait: Narrow gait and steady    NIHSS: 2        Note: F/u Vasculitis labs and Hypercoagulable. Will consider Bigler disease HPI:  48 yo male with PMHX CVA 2019 s/p TPA at [facility],  HTN compliant diagnosed at age 23 yo. Patient presents with right facial droop and slurred speech started yesterday at 4pm and neurology was consulted. Collateral from wife and patient state patient woke up this morning at his base line with no complaints and went to work. Around 4 pm while leaving work he called his wife urged him to take an ambulance and went to [facility] CTH was done (does not have record of results) and prescribed him aspirin and cholesterol medication. Patient left hospital AMA and came here for further management because his slurred speech and numbness on left face has not resolved. (22 Feb 2023 12:53)    MR Head No Cont (02.22.23 @ 09:19) > Focal acute infarct involving the left posterior limb of the internal capsule. No evidence of hemorrhage. Chronic lacunar infarcts involving the right thalamus as well as the chelly as well as mild chronic microvascular type changes.    Mr. Fatima was managed by the Neurovascular team for 3 days w/ MRI findings of acute left IC infarct. other than HTN, all other risk factors were negative. He had a normal TTE in the past. Due to childcare complications he was unable to complete cardiac images w/ updated echo. He was referred to the cardiac Map clinic as well as Medicine MAP clinic for Echo and established care. He was started on secondary stroke prevention and will f/u in the stroke clinic 2-3 weeks.       Neurologic:  -Mental status: Awake, alert, oriented to person, place, and time. Speech is fluent with intact naming, repetition, and comprehension, mild dysarthria.   -Cranial nerves:   II: Visual fields are full to confrontation.  III, IV, VI: Extraocular movements are intact without nystagmus. Pupils equally round and reactive to light  V:  Facial sensation V1-V3 equal and intact   VII: Right Face is asymmetric   VIII: Hearing is bilaterally intact to finger rub  IX, X: Uvula is midline and soft palate rises symmetrically  XI: Head turning and shoulder shrug are intact.  XII: Tongue protrudes midline  Motor: Normal bulk and tone. No pronator drift. Strength bilateral upper extremity 5/5, bilateral lower extremities 5/5.  Rapid alternating movements intact and symmetric  Sensation: Intact to light touch bilaterally. No neglect or extinction on double simultaneous testing.  Coordination: No dysmetria on finger-to-nose and heel-to-shin bilaterally  Reflexes: Downgoing toes bilaterally, 2+ biceps, triceps, Knees, achilles   Gait: Narrow gait and steady    NIHSS: 2        Note: F/u Vasculitis labs and Hypercoagulable. Will consider Fabry disease w/u HPI:  46 yo male with PMHX CVA 2019 s/p TPA at [facility],  HTN compliant diagnosed at age 25 yo. Patient presents with right facial droop and slurred speech started yesterday at 4pm and neurology was consulted. Collateral from wife and patient state patient woke up this morning at his base line with no complaints and went to work. Around 4 pm while leaving work he called his wife urged him to take an ambulance and went to [facility] CTH was done (does not have record of results) and prescribed him aspirin and cholesterol medication. Patient left hospital AMA and came here for further management because his slurred speech and numbness on left face has not resolved. (22 Feb 2023 12:53)    MR Head No Cont (02.22.23 @ 09:19) > Focal acute infarct involving the left posterior limb of the internal capsule. No evidence of hemorrhage. Chronic lacunar infarcts involving the right thalamus as well as the chelly as well as mild chronic microvascular type changes.    Mr. Fatima was managed by the Neurovascular team for 3 days w/ MRI findings of acute left IC infarct. other than HTN, all other risk factors were negative. He had a normal TTE in the past. Due to childcare complications he was unable to complete cardiac images w/ updated echo. He was referred to the cardiac Map clinic as well as Medicine MAP clinic for Echo and established care. He was started on secondary stroke prevention and will f/u in the stroke clinic 2-3 weeks.       Neurologic:  -Mental status: Awake, alert, oriented to person, place, and time. Speech is fluent with intact naming, repetition, and comprehension, mild dysarthria.   -Cranial nerves:   II: Visual fields are full to confrontation.  III, IV, VI: Extraocular movements are intact without nystagmus. Pupils equally round and reactive to light  V:  Facial sensation V1-V3 equal and intact   VII: Right Face is asymmetric   VIII: Hearing is bilaterally intact to finger rub  IX, X: Uvula is midline and soft palate rises symmetrically  XI: Head turning and shoulder shrug are intact.  XII: Tongue protrudes midline  Motor: Normal bulk and tone. No pronator drift. Strength bilateral upper extremity 5/5, bilateral lower extremities 5/5.  Rapid alternating movements intact and symmetric  Sensation: Intact to light touch bilaterally. No neglect or extinction on double simultaneous testing.  Coordination: No dysmetria on finger-to-nose and heel-to-shin bilaterally  Reflexes: Downgoing toes bilaterally, 2+ biceps, triceps, Knees, achilles   Gait: Narrow gait and steady    NIHSS: 2        Note: F/u Vasculitis labs and Hypercoagulable. Will consider Fabry disease w/u    Attending Attestation: Patient seen and examined and agree with above except as noted.  Patients history, notes ,labs, imaging, vitals and meds reviewed personally.  Doing well symptoms almost completely resolved.  DM and HLD well controlled.  BP poorly controlled.  F/u in stroke clinic in 2-3 weeks

## 2023-02-24 NOTE — PROGRESS NOTE ADULT - SUBJECTIVE AND OBJECTIVE BOX
WILFREDO SUNIL  47y, Male  Allergy: No Known Allergies    Hospital Day: 2d    Patient seen and examined earlier today along with neurology team.  wants to leave ASAP. pending Echo     PMH/PSH:  PAST MEDICAL & SURGICAL HISTORY:  HTN (hypertension)      Stroke  3 weeks ago      No significant past surgical history          LAST 24-Hr EVENTS:    VITALS:  T(F): 96.9 (02-24-23 @ 08:00), Max: 98 (02-24-23 @ 00:00)  HR: 58 (02-24-23 @ 08:00)  BP: 160/101 (02-24-23 @ 08:00) (147/91 - 205/116)  RR: 18 (02-24-23 @ 08:00)  SpO2: 98% (02-24-23 @ 08:00)          TESTS & MEASUREMENTS:  Weight/BMI  88.5 (02-24-23 @ 05:21)  27.2 (02-24-23 @ 05:21)                          11.8   4.98  )-----------( 163      ( 24 Feb 2023 08:27 )             38.0       INR: 1.12 ratio (02-21-23 @ 16:39)    02-24    139  |  102  |  12  ----------------------------<  123<H>  3.5   |  29  |  1.0    Ca    9.5      24 Feb 2023 08:27    TPro  7.3  /  Alb  4.2  /  TBili  1.6<H>  /  DBili  x   /  AST  13  /  ALT  10  /  AlkPhos  81  02-24    LIVER FUNCTIONS - ( 24 Feb 2023 08:27 )  Alb: 4.2 g/dL / Pro: 7.3 g/dL / ALK PHOS: 81 U/L / ALT: 10 U/L / AST: 13 U/L / GGT: x                           COVID-19 PCR: NotDetec (02-21-23 @ 23:55)        A1C with Estimated Average Glucose Result: 5.2 % (02-22-23 @ 00:35)          RADIOLOGY, ECG, & ADDITIONAL TESTS:  12 Lead ECG:   Ventricular Rate 64 BPM    Atrial Rate 64 BPM    P-R Interval 230 ms    QRS Duration 112 ms    Q-T Interval 436 ms    QTC Calculation(Bazett) 449 ms    P Axis 67 degrees    R Axis -44 degrees    T Axis 50 degrees    Diagnosis Line Sinus rhythm withsinus arrhythmia with 1st degree A-V block  Possible Left atrial enlargement  Left axis deviation  Cannot rule out Anterior infarct , age undetermined  Abnormal ECG    Confirmed by RENNY WOOD MD ([id number]) on 2/22/2023 6:59:55 AM (02-21-23 @ 16:58)    CT Head No Cont:   ACC: [id number] EXAM:  CT BRAIN   ORDERED BY: DAVID PERDOMO     PROCEDURE DATE:  02/21/2023          INTERPRETATION:  Clinical History / Reason for exam: Slurred speech and   facial numbness.    CT SCAN OF THE BRAIN WITHOUT CONTRAST    TECHNIQUE:    Multiple transaxial noncontrast CT images of the brain were obtained from   base to vertex. Sagittal and coronal reformatted images were obtained.    FINDINGS:    The third, fourth, and lateral ventricles are normal in size and position.    There is noshift of the midline structures or evidence of acute   intracranial hemorrhage or depressed skull fracture.    Mucosal thickening in the left maxillary sinus.    IMPRESSION:    Unremarkable non-contrast CT scan of the brain.    --- End of Report ---            LEE ANN PORTILLO MD; Attending Radiologist  This document has been electronically signed. Feb 21 2023  4:42PM (02-21-23 @ 16:12)    RECENT DIAGNOSTIC ORDERS:  Cryoglobulin, Serum: 11:00 (02-24-23 @ 10:40)  Thyroid Stimulating Hormone, Serum: 11:00 (02-24-23 @ 10:40)  Sjogren's Syndrome Antibodies: 11:00 (02-24-23 @ 10:40)  Sedimentation Rate, Erythrocyte: 11:00 (02-24-23 @ 10:40)  C-Reactive Protein, Serum: 11:00 (02-24-23 @ 10:40)  Hepatitis C Antibody Screen: 11:00 (02-24-23 @ 10:40)  Anti-Nuclear Antibody: 11:00 (02-24-23 @ 10:40)  Neutrophil Cytoplasmic Antibody: 11:00 (02-24-23 @ 10:40)  Cyclic Citrullinated Peptide AB: 11:00 (02-24-23 @ 10:40)  Lipoprotein A, Serum: Routine (02-23-23 @ 15:55)      MEDICATIONS:  MEDICATIONS  (STANDING):  aspirin enteric coated 81 milliGRAM(s) Oral daily  atorvastatin 80 milliGRAM(s) Oral at bedtime  clopidogrel Tablet 75 milliGRAM(s) Oral daily  heparin   Injectable 5000 Unit(s) SubCutaneous every 8 hours    MEDICATIONS  (PRN):      HOME MEDICATIONS:  amLODIPine 2.5 mg oral tablet (08-12)  aspirin 81 mg oral tablet, chewable (08-12)  atorvastatin 80 mg oral tablet (08-12)  oxyCODONE 5 mg oral capsule (08-15)      PHYSICAL EXAM:  GENERAL: Patient lying on bed, comfortable   CHEST/LUNG: NVB, no wheezing   HEART: R1+R2, RRR  ABDOMEN: Soft. non tender, BS positive  EXTREMITIES:  no edema, Bruising  CNS: AAAx4.       
Patient is a 47y old  Male who presents with a chief complaint of slurred speech    HPI:  46 yo male with PMHX CVA 2019 s/p TPA at [facility],  HTN compliant diagnosed at age 25 yo. Patient presents with right facial droop and slurred speech started yesterday at 4pm and neurology was consulted. Collateral from wife and patient state patient woke up this morning at his base line with no complaints and went to work. Around 4 pm while leaving work he called his wife urged him to take an ambulance and went to [facility] CTH was done (does not have record of results) and prescribed him aspirin and cholesterol medication. Patient left hospital AMA and came here for further management because his slurred speech and numbness on left face has not resolved.     He was not a candidate for TNK or thrombectomy. CTH, and CTA as reported above. MR done and showed acute stroke in the Lt basal ganglia/MCA territory     < from: CT Head No Cont (02.21.23 @ 16:12) >  IMPRESSION:    Unremarkable non-contrast CT scan of the brain.      < end of copied text >      < from: CT Angio Head w/ IV Cont (02.22.23 @ 01:11) >  IMPRESSION:    Severe stenoses noted of the bilateral proximal P2 segments of the PCA.    Moderate stenosis of the proximal superior M2 division of the left MCA.      < end of copied text >    Medical charts / labs / imaging studies /  OT, and Speech therapy notes reviewed       PHYSICAL EXAM    Vital Signs Last 24 Hrs  T(C): 35.9 (24 Feb 2023 05:21), Max: 36.7 (24 Feb 2023 00:00)  T(F): 96.7 (24 Feb 2023 05:21), Max: 98 (24 Feb 2023 00:00)  HR: 70 (24 Feb 2023 05:21) (55 - 70)  BP: 176/104 (24 Feb 2023 05:21) (147/91 - 228/128)  BP(mean): 132 (24 Feb 2023 05:21) (63 - 132)  RR: 18 (24 Feb 2023 05:21) (18 - 20)  SpO2: 98% (24 Feb 2023 05:21) (97% - 100%)    Parameters below as of 24 Feb 2023 05:21  Patient On (Oxygen Delivery Method): room air        Constitutional - NAD  Chest - CTA  Cardiovascular - S1S2+  Abdomen -  Soft  Extremities -  No calf tenderness   Function : bed mobility and transfer independent                  ambulating on unit independent / upper and lower body dressing sup-independent       aspirin enteric coated 81 milliGRAM(s) Oral daily  atorvastatin 80 milliGRAM(s) Oral at bedtime  clopidogrel Tablet 75 milliGRAM(s) Oral daily  heparin   Injectable 5000 Unit(s) SubCutaneous every 8 hours      RECENT LABS/IMAGING                    
Neurology Stroke Progress Note    INTERVAL HPI/OVERNIGHT EVENTS:  Patient seen and examined at the bedside. No issues overnight. He feels better, and the numbness is better with speech improving as well.     MEDICATIONS  (STANDING):  aspirin enteric coated 81 milliGRAM(s) Oral daily  atorvastatin 80 milliGRAM(s) Oral at bedtime  clopidogrel Tablet 75 milliGRAM(s) Oral daily  heparin   Injectable 5000 Unit(s) SubCutaneous every 8 hours    MEDICATIONS  (PRN):      Allergies    No Known Allergies    Intolerances        Vital Signs Last 24 Hrs  T(C): 36.6 (23 Feb 2023 09:06), Max: 36.6 (23 Feb 2023 09:06)  T(F): 97.8 (23 Feb 2023 09:06), Max: 97.8 (23 Feb 2023 09:06)  HR: 60 (23 Feb 2023 09:06) (60 - 66)  BP: 169/94 (23 Feb 2023 09:08) (165/103 - 228/128)  BP(mean): --  RR: 20 (23 Feb 2023 09:06) (18 - 20)  SpO2: 99% (23 Feb 2023 09:06) (99% - 100%)    Parameters below as of 23 Feb 2023 09:06  Patient On (Oxygen Delivery Method): room air        Physical exam:  General: No acute distress, awake and alert      Neurologic:  -Mental status: Awake, alert, oriented to person, place, and time. Speech is fluent with intact naming, repetition, and comprehension, mild dysarthria. Follows commands.   -Cranial nerves:   II: Visual fields are full to confrontation.  III, IV, VI: Extraocular movements are intact without nystagmus. Pupils equally round and reactive to light  V:  Facial sensation V1-V3 equal and intact   VII: mild Lt facial weakness.   VIII: Hearing is bilaterally intact to finger rub  IX, X: Uvula is midline and soft palate rises symmetrically  XI: Head turning and shoulder shrug are intact.  XII: Tongue protrudes midline  Motor: Normal bulk and tone. No pronator drift. Strength bilateral upper extremity 5/5, bilateral lower extremities 5/5.  Rapid alternating movements intact and symmetric  Sensation: Intact to light touch bilaterally. No neglect or extinction on double simultaneous testing.  Coordination: No dysmetria on finger-to-nose and heel-to-shin bilaterally  Reflexes: Downgoing toes bilaterally   Gait: Narrow gait and steady      NIHSS: 2      LABS:                        11.5   5.05  )-----------( 159      ( 21 Feb 2023 16:40 )             37.7     02-21    141  |  104  |  11  ----------------------------<  104<H>  3.7   |  27  |  0.9    Ca    9.5      21 Feb 2023 16:40    TPro  7.9  /  Alb  4.5  /  TBili  2.0<H>  /  DBili  x   /  AST  19  /  ALT  14  /  AlkPhos  82  02-21    PT/INR - ( 21 Feb 2023 16:39 )   PT: 12.80 sec;   INR: 1.12 ratio         PTT - ( 21 Feb 2023 16:39 )  PTT:34.4 sec      RADIOLOGY & ADDITIONAL TESTS:    < from: CT Head No Cont (02.21.23 @ 16:12) >  IMPRESSION:    Unremarkable non-contrast CT scan of the brain.      < end of copied text >      < from: CT Angio Head w/ IV Cont (02.22.23 @ 01:11) >  IMPRESSION:    Severe stenoses noted of the bilateral proximal P2 segments of the PCA.    Moderate stenosis of the proximal superior M2 division of the left MCA.      < end of copied text >

## 2023-02-24 NOTE — PHYSICAL THERAPY INITIAL EVALUATION ADULT - PERTINENT HX OF CURRENT PROBLEM, REHAB EVAL
48 yo male with PMHX CVA 2019 s/p TPA at [facility],  HTN compliant diagnosed at age 25 yo. Patient presents with right facial droop and slurred speech started yesterday at 4pm and neurology was consulted. Collateral from wife and patient state patient woke up this morning at his base line with no complaints and went to work. Around 4 pm while leaving work he called his wife urged him to take an ambulance and went to [facility] CTH was done (does not have record of results) and prescribed him aspirin and cholesterol medication. Patient left hospital AMA and came here for further management because his slurred speech and numbness on left face has not resolved.

## 2023-02-24 NOTE — PHYSICAL THERAPY INITIAL EVALUATION ADULT - GENERAL OBSERVATIONS, REHAB EVAL
8:30-9:01 Pt. encountered in semifowler in bed in NAD. +Tele. Vitals monitored t/o session: /mmHg, HR bpm in supine, /104mmHg HR bpm in sitting, /100mmHg HR 79 bpm in standing. 8:30-9:01 Pt. encountered in semifowler in bed in NAD. +Tele. Normal visual tracking and accommodation. Vitals monitored t/o session: /102mmHg, HR 82 bpm in supine, /104mmHg HR 92 bpm in sitting, /100mmHg HR 79 bpm in standing. Cydney PINEDA aware.

## 2023-02-24 NOTE — SPEECH LANGUAGE PATHOLOGY EVALUATION - SLP DIAGNOSIS
Pt p/w  mild- mod dysarthric speech. Pt with functional receptive/ expressive language. Pt had minimal deficits in mental manipulation .

## 2023-02-24 NOTE — SPEECH LANGUAGE PATHOLOGY EVALUATION - SLP REHAB POTENTIAL
POSTPARTUM VISIT    Saúl Moraes presents today for postpartum visit  She had a vaginal delivery on 6/18/2020  Complications included none  She is bottlefeeding her infant and reports no issues with such  She desires OCP for contraception  She was provided with [place] Depression Screening tool and her score was 4  Review of Systems:   -Constitutional: denies issues, denies pain, denies headaches or visual changes   -Breasts: denies tenderness   -Gynecologic: lochia continues - moderate flow   -Urinary: denies issues urinating   -GI: stools WNL, denies n/v    Physical Exam:   -Vitals:   Vitals:    07/09/20 0921   BP: (!) 161/109   BP Location: Left arm   Patient Position: Sitting   Cuff Size: Adult   Pulse: 73   Temp: 97 5 °F (36 4 °C)   TempSrc: Tympanic   Weight: 111 kg (244 lb 3 2 oz)   Height: 5' 3" (1 6 m)    -General: A&Ox3, no acute distress noted   -Abdomen: soft, non-tender   -Extremities: nontender, no edema noted   -Breasts: deferred   -Pelvic exam: deferred    Assessment/Plan:  1  Abnormal postpartum BP  Given Rx Procardia XL 30mg daily  Will check pre-eclamptic labs to be drawn now  2  Depression screening negative  3  Last pap smear was done 3/20/2019 and result was NILM  Advise return for next annual GYN exam in 3/2021   4  Contraception: has Rx Micronor and plans to start it in 3 days (on Sunday)  Will then start Junel Fe after first pack Micronor completed  5  Needs 2h GTT after 6 weeks postpartum due to GDM during pregnancy  6  Return in 4 days for BP check  7  Reviewed s/s pre-eclampsia to report immediately 
good, to achieve stated therapy goals

## 2023-02-24 NOTE — DISCHARGE NOTE PROVIDER - NSDCMRMEDTOKEN_GEN_ALL_CORE_FT
amLODIPine 2.5 mg oral tablet: 1 tab(s) orally once a day  aspirin 81 mg oral tablet, chewable: 1 tab(s) orally once a day- start on 6/7/19.  atorvastatin 80 mg oral tablet: 1 tab(s) orally once a day (at bedtime)  oxyCODONE 5 mg oral capsule: 1 cap(s) orally every 6 hours, As Needed MDD:5    amLODIPine 2.5 mg oral tablet: 1 tab(s) orally once a day  aspirin 81 mg oral tablet, chewable: 1 tab(s) orally once a day- start on 6/7/19.  atorvastatin 80 mg oral tablet: 1 tab(s) orally once a day (at bedtime)  clopidogrel 75 mg oral tablet: 1 tab(s) orally once a day  oxyCODONE 5 mg oral capsule: 1 cap(s) orally every 6 hours, As Needed MDD:5

## 2023-02-24 NOTE — DISCHARGE NOTE NURSING/CASE MANAGEMENT/SOCIAL WORK - PATIENT PORTAL LINK FT
You can access the FollowMyHealth Patient Portal offered by [facility] by registering at the following website: [email or web address]. By joining [facility]’s FollowMyHealth portal, you will also be able to view your health information using other applications (apps) compatible with our system.

## 2023-02-24 NOTE — PHYSICAL THERAPY INITIAL EVALUATION ADULT - ADDITIONAL COMMENTS
Pt. lives in a private home with some of his children with 5 steps to enter and 10 steps inside. Reports that he has 16 children; some live with him and some live with wife in [place]. Works as a . +Driving. Denies use of DME or AD PTA.

## 2023-02-24 NOTE — PATIENT PROFILE ADULT - FUNCTIONAL ASSESSMENT - BASIC MOBILITY 6.
4-calculated by average/Not able to assess (calculate score using [facility] averaging method)

## 2023-02-24 NOTE — DISCHARGE NOTE PROVIDER - NSDCFUSCHEDAPPT_GEN_ALL_CORE_FT
Kyung Herrera  [facility]  Scheduled Appointment: 03/15/2023    Kyung Herrera  [facility] Physician [facility]  [address]  Scheduled Appointment: 03/15/2023    [facility]  NEUROLOGY [address]  Scheduled Appointment: 03/21/2023     Brian Paulino  [facility] Physician [facility]  CARDIOLOGY [address]  Scheduled Appointment: 02/28/2023    Kyung Herrera [facility]  Scheduled Appointment: 03/15/2023    Kyung Herrera  [facility] Physician [facility]  [address]  Scheduled Appointment: 03/15/2023    [facility]  NEUROLOGY [address]  Scheduled Appointment: 03/21/2023

## 2023-02-24 NOTE — DISCHARGE NOTE PROVIDER - CARE PROVIDER_API CALL
Behuria, Supreeti (MD)  Cardiology; Internal Medicine; Nuclear Cardiology  [address] [place] [address]  Phone: [phone]  Fax: [phone]  Follow Up Time: 1 week

## 2023-02-24 NOTE — PATIENT PROFILE ADULT - FALL HARM RISK - HARM RISK INTERVENTIONS

## 2023-02-24 NOTE — SPEECH LANGUAGE PATHOLOGY EVALUATION - SLP PERTINENT HISTORY OF CURRENT PROBLEM
46 yo male with PMH CVA 2019 s/p TPA at [facility],  HTN compliant diagnosed at age 25 yo. Pt p/w  right facial droop and slurred speech that started 2/21 at 4pm and went to hospitals where he took a CTH and was prescribed aspirin and cholesterol medication. Pt left hospital AMA and came here for further management for unresolved slurred speech and numbness. NIHSS of 2 for right facial droop and slurred speech. CTH negative, out of window for TNK. CTA H/N shows mild stenosis right posterior cerebral artery. MRI head -->acute infarct in the left posterior limb of the internal capsule. Chronic lacunar infarcts involving the right thalamus and the chelly.

## 2023-02-24 NOTE — PHYSICAL THERAPY INITIAL EVALUATION ADULT - NSPTDISCHREC_GEN_A_CORE
Pt. in independent with fxnl mobility. No skilled PT needs at this time. Please consult if change in fxnl status is observed./No skilled PT needs

## 2023-02-25 LAB
CARDIOLIPIN AB SER-ACNC: NEGATIVE — SIGNIFICANT CHANGE UP
PROT S FREE PPP-ACNC: 68 % — SIGNIFICANT CHANGE UP (ref 63–140)

## 2023-02-26 LAB
DSDNA AB FLD-ACNC: <0.2 AI — SIGNIFICANT CHANGE UP
ENA SS-A AB FLD IA-ACNC: <0.2 AI — SIGNIFICANT CHANGE UP

## 2023-02-27 LAB
ANA TITR SER: NEGATIVE — SIGNIFICANT CHANGE UP
AUTO DIFF PNL BLD: NEGATIVE — SIGNIFICANT CHANGE UP
B2 GLYCOPROT1 AB SER QL: NEGATIVE — SIGNIFICANT CHANGE UP
C-ANCA SER-ACNC: NEGATIVE — SIGNIFICANT CHANGE UP
LIDOCAIN SERPL-MCNC: 212.6 NMOL/L — HIGH
P-ANCA SER-ACNC: NEGATIVE — SIGNIFICANT CHANGE UP

## 2023-02-28 LAB
CCP IGG SERPL-ACNC: <8 UNITS — SIGNIFICANT CHANGE UP
RF+CCP IGG SER-IMP: NEGATIVE — SIGNIFICANT CHANGE UP

## 2023-03-01 DIAGNOSIS — Z86.73 PERSONAL HISTORY OF TRANSIENT ISCHEMIC ATTACK (TIA), AND CEREBRAL INFARCTION WITHOUT RESIDUAL DEFICITS: ICD-10-CM

## 2023-03-01 DIAGNOSIS — Z79.82 LONG TERM (CURRENT) USE OF ASPIRIN: ICD-10-CM

## 2023-03-01 DIAGNOSIS — R29.702 NIHSS SCORE 2: ICD-10-CM

## 2023-03-01 DIAGNOSIS — Z20.822 CONTACT WITH AND (SUSPECTED) EXPOSURE TO COVID-19: ICD-10-CM

## 2023-03-01 DIAGNOSIS — R47.81 SLURRED SPEECH: ICD-10-CM

## 2023-03-01 DIAGNOSIS — I63.512 CEREBRAL INFARCTION DUE TO UNSPECIFIED OCCLUSION OR STENOSIS OF LEFT MIDDLE CEREBRAL ARTERY: ICD-10-CM

## 2023-03-01 DIAGNOSIS — E78.5 HYPERLIPIDEMIA, UNSPECIFIED: ICD-10-CM

## 2023-03-01 DIAGNOSIS — R47.1 DYSARTHRIA AND ANARTHRIA: ICD-10-CM

## 2023-03-01 DIAGNOSIS — I10 ESSENTIAL (PRIMARY) HYPERTENSION: ICD-10-CM

## 2023-03-01 DIAGNOSIS — R29.810 FACIAL WEAKNESS: ICD-10-CM

## 2023-03-02 LAB
DNA PLOIDY SPEC FC-IMP: SIGNIFICANT CHANGE UP
DRVVT RATIO: 0.97 RATIO — SIGNIFICANT CHANGE UP (ref 0–1.21)
DRVVT SCREEN TO CONFIRM RATIO: SIGNIFICANT CHANGE UP
NORMALIZED SCT PPP-RTO: 0.91 RATIO — SIGNIFICANT CHANGE UP (ref 0–1.16)
NORMALIZED SCT PPP-RTO: SIGNIFICANT CHANGE UP
PROT C ACT/NOR PPP: 104 % — SIGNIFICANT CHANGE UP (ref 65–129)
PTR INTERPRETATION: SIGNIFICANT CHANGE UP

## 2023-03-09 ENCOUNTER — APPOINTMENT (OUTPATIENT)
Dept: CARDIOLOGY | Facility: CLINIC | Age: 48
End: 2023-03-09
Payer: MEDICAID

## 2023-03-09 VITALS
WEIGHT: 189.56 LBS | SYSTOLIC BLOOD PRESSURE: 156 MMHG | DIASTOLIC BLOOD PRESSURE: 96 MMHG | TEMPERATURE: 97.6 F | HEIGHT: 71 IN | OXYGEN SATURATION: 99 % | BODY MASS INDEX: 26.54 KG/M2 | HEART RATE: 68 BPM

## 2023-03-09 DIAGNOSIS — Z78.9 OTHER SPECIFIED HEALTH STATUS: ICD-10-CM

## 2023-03-09 PROCEDURE — 99204 OFFICE O/P NEW MOD 45 MIN: CPT | Mod: 25

## 2023-03-09 PROCEDURE — 93000 ELECTROCARDIOGRAM COMPLETE: CPT

## 2023-03-09 NOTE — HISTORY OF PRESENT ILLNESS
[FreeTextEntry1] : Mr. Fatima is a 47-year-old man with history of CVA 2019 s/p tPA and HTN presenting to establish care with a primary cardiologist after recent recurrent CVA.\par \par Patient was admitted 2/2023. Initially he went to [facility] where CTH was done but he left AMA prior to hearing results. He continued to have slurred speech and left facial numbness and underwent MR head showing acute internal capsule infarct and chronic lacunar infarcts. He did not undergo TTE and was referred for outpatient follow up.\par \par Today, he endorses a half block dyspnea on exertion, new since his hospitalization without chest pain, orthopnea, or PND.\par \par Labs:\par - lp(a) 213, A1c 5.2, TSH 1.08, CRP <3\par - , TG 45, HDL 61, LDL 88, non-HDL 96\par - Hgb 11.8, Plt 163, Cr 1.0, K 3.5\par \par CTA H/N 2/2023:\par - Severe stenoses of the bilateral proximal P2 segments of the PCA\par - Moderate stenosis of the proximal superior M2 division of the left MCA\par \par MR Brain 2/2023\par - Focal acute infarct involving the left posterior limb of the internal capsule. No evidence of hemorrhage.\par - Chronic lacunar infarcts involving the right thalamus as well as the chelly as well as mild chronic microvascular type changes.\par \par Carotid Duplex 8/2019\par - 40-59% R ICA\par - 20-39% L ICA\par \par TTE 2019\par - Normal biventricular function, moderate concentric LVH, G1DD

## 2023-03-09 NOTE — DISCUSSION/SUMMARY
Detail Level: Detailed [EKG obtained to assist in diagnosis and management of assessed problem(s)] : EKG obtained to assist in diagnosis and management of assessed problem(s)

## 2023-03-09 NOTE — ASSESSMENT
[FreeTextEntry1] : Mr. Fatima is a 47-year-old man with history of CVA 2019 s/p tPA and HTN presenting to establish care with a primary cardiologist after recent recurrent CVA.\par \par Impression:\par (1) Dyspnea on exertion, last TTE with LVH, some concern for HCM vs Fabry's in light of premature ASCVD with CVA x2\par (2) Elevated lp(a) - signficant risk factor for ASCVD. No therapies currently available other than apheresis, though this is a temporizing measure and requires frequent sessions. New therapies are on the horizon. \par (3) HTN, poorly controlled, only on amlodipine 2.5mg BID\par \par Plan:\par - Check TTE to start. Low threshold to refer for cMRI based on findings. Low threshold to refer for CCTA as well.\par - Cautious use of statins with high lp(a) elevation as use is associated with increased lp(a). Will need to ascertain what patient's lipid panel was prior to initiation; if LDL was significantly elevated, would continue.\par - Consider MCOT placement on future visit. Patient had limited time for his visit today as he was with 6 of his children during this visit.\par \par RTC after TTE.

## 2023-03-10 LAB
NT-PROBNP SERPL-MCNC: 74 PG/ML
TSH SERPL-ACNC: 0.31 UIU/ML

## 2023-03-15 ENCOUNTER — NON-APPOINTMENT (OUTPATIENT)
Age: 48
End: 2023-03-15

## 2023-03-15 ENCOUNTER — OUTPATIENT (OUTPATIENT)
Dept: OUTPATIENT SERVICES | Facility: HOSPITAL | Age: 48
LOS: 1 days | End: 2023-03-15
Payer: MEDICAID

## 2023-03-15 ENCOUNTER — APPOINTMENT (OUTPATIENT)
Dept: INTERNAL MEDICINE | Facility: CLINIC | Age: 48
End: 2023-03-15
Payer: MEDICAID

## 2023-03-15 VITALS
HEART RATE: 63 BPM | TEMPERATURE: 97.5 F | BODY MASS INDEX: 26.46 KG/M2 | SYSTOLIC BLOOD PRESSURE: 180 MMHG | OXYGEN SATURATION: 99 % | DIASTOLIC BLOOD PRESSURE: 126 MMHG | HEIGHT: 71 IN | WEIGHT: 189 LBS

## 2023-03-15 VITALS — SYSTOLIC BLOOD PRESSURE: 175 MMHG | DIASTOLIC BLOOD PRESSURE: 105 MMHG

## 2023-03-15 DIAGNOSIS — Z86.79 PERSONAL HISTORY OF OTHER DISEASES OF THE CIRCULATORY SYSTEM: ICD-10-CM

## 2023-03-15 DIAGNOSIS — Z86.73 PERSONAL HISTORY OF TRANSIENT ISCHEMIC ATTACK (TIA), AND CEREBRAL INFARCTION W/OUT RESIDUAL DEFICITS: ICD-10-CM

## 2023-03-15 DIAGNOSIS — Z00.00 ENCOUNTER FOR GENERAL ADULT MEDICAL EXAMINATION W/OUT ABNORMAL FINDINGS: ICD-10-CM

## 2023-03-15 DIAGNOSIS — Z00.00 ENCOUNTER FOR GENERAL ADULT MEDICAL EXAMINATION WITHOUT ABNORMAL FINDINGS: ICD-10-CM

## 2023-03-15 LAB
ESTIMATED AVERAGE GLUCOSE: 91 MG/DL
HBA1C MFR BLD HPLC: 4.8 %

## 2023-03-15 PROCEDURE — 83036 HEMOGLOBIN GLYCOSYLATED A1C: CPT

## 2023-03-15 PROCEDURE — 85027 COMPLETE CBC AUTOMATED: CPT

## 2023-03-15 PROCEDURE — 80061 LIPID PANEL: CPT

## 2023-03-15 PROCEDURE — 99204 OFFICE O/P NEW MOD 45 MIN: CPT

## 2023-03-15 PROCEDURE — 99204 OFFICE O/P NEW MOD 45 MIN: CPT | Mod: GC

## 2023-03-15 PROCEDURE — 80053 COMPREHEN METABOLIC PANEL: CPT

## 2023-03-15 NOTE — HISTORY OF PRESENT ILLNESS
[FreeTextEntry1] : establishing care [de-identified] : 47-year-old man with history of CVA x2  2019 s/p tPA and 2/2023 and HTN presenting to establish care with a primary care physician.\par \par He complains of dyspnea on exertion and occasional throbbing headache that is worse in th oprning and improved after taking his BP meds.\par No other complaints\par patient is compliant to low salt diet. His Anti-HTN meds were recentlyu changed however he didn’t  the prescription.No chest pain, orthopnea, or PND.

## 2023-03-15 NOTE — ASSESSMENT
[FreeTextEntry1] : 47-year-old man with history of CVA 2019 s/p tPA and HTN presenting to establish care.\par \par #HTN - uncontrolled \par - work up for 2ry HTN negative (PRA, serum metanephrines, TSH)\par - patient did not  his anti-HTN meds\par - start amlodipine-valsartan 5/160mg qd today\par - salt restriction\par - monitor BP at home\par \par #Exertional dyspnea - walks half a block\par -TTE in 2019: moderate LVH, grade 1DDx\par - has repeat TTE scheduled for april\par - Pro-BNP -ve\par - exertional dyspnea likely related to uncontrolled HTN\par - can not rule out stable angina\par - follows with cardiology and will probably need functional/structural testing \par   \par #Recurrent CVAs\par - c/w ASA and Plavix\par - c/w atorvastatin\par - LDL 88\par - A1c 5.2%\par - Lipoprotein A: 212 (maynor)\par - Hypercoaguable work up unrevealing\par - control BP\par \par #HCM\par - Screening colonoscopy not offered during this visit - will offer it once BP is well controlled\par - RTC in 3 months with labs\par

## 2023-03-15 NOTE — PHYSICAL EXAM
[Normal] : normal gait, coordination grossly intact, no focal deficits and deep tendon reflexes were 2+ and symmetric [Well Developed] : well developed [Well Nourished] : well nourished [No Acute Distress] : no acute distress [Normal Conjunctiva] : normal conjunctiva [Normal Venous Pressure] : normal venous pressure [No Carotid Bruit] : no carotid bruit [Normal S1, S2] : normal S1, S2 [No Murmur] : no murmur [No Rub] : no rub [No Gallop] : no gallop [Clear Lung Fields] : clear lung fields [Good Air Entry] : good air entry [No Respiratory Distress] : no respiratory distress  [Soft] : abdomen soft [Non Tender] : non-tender [No Masses/organomegaly] : no masses/organomegaly [Normal Bowel Sounds] : normal bowel sounds [No Edema] : no edema [Normal Gait] : normal gait [No Cyanosis] : no cyanosis [No Clubbing] : no clubbing [No Varicosities] : no varicosities [No Rash] : no rash [No Skin Lesions] : no skin lesions [Moves all extremities] : moves all extremities [No Focal Deficits] : no focal deficits [Normal Speech] : normal speech [Alert and Oriented] : alert and oriented [Normal memory] : normal memory

## 2023-03-16 DIAGNOSIS — Z86.73 PERSONAL HISTORY OF TRANSIENT ISCHEMIC ATTACK (TIA), AND CEREBRAL INFARCTION WITHOUT RESIDUAL DEFICITS: ICD-10-CM

## 2023-03-16 DIAGNOSIS — I63.9 CEREBRAL INFARCTION, UNSPECIFIED: ICD-10-CM

## 2023-03-16 DIAGNOSIS — Z00.00 ENCOUNTER FOR GENERAL ADULT MEDICAL EXAMINATION WITHOUT ABNORMAL FINDINGS: ICD-10-CM

## 2023-03-16 DIAGNOSIS — I10 ESSENTIAL (PRIMARY) HYPERTENSION: ICD-10-CM

## 2023-03-16 LAB
ALDOSTERONE SERUM: 10.7 NG/DL
ALPHA-GALACTOSIDASE, LEUKOCYTES: 6.59
INTERPRETATION AGAW: NORMAL
METANEPHRINE, PL: 32.8 PG/ML
NORMETANEPHRINE, PL: 83.8 PG/ML
RENIN PLASMA: <2.1 PG/ML
REVIEWED BY AGAW: NORMAL

## 2023-03-20 LAB
ALBUMIN SERPL ELPH-MCNC: 4.8 G/DL
ALP BLD-CCNC: 82 U/L
ALT SERPL-CCNC: 13 U/L
ANION GAP SERPL CALC-SCNC: 10 MMOL/L
AST SERPL-CCNC: 17 U/L
BASOPHILS # BLD AUTO: 0.03 K/UL
BASOPHILS NFR BLD AUTO: 0.7 %
BILIRUB SERPL-MCNC: 2.1 MG/DL
BUN SERPL-MCNC: 11 MG/DL
CALCIUM SERPL-MCNC: 9.5 MG/DL
CHLORIDE SERPL-SCNC: 105 MMOL/L
CHOLEST SERPL-MCNC: 114 MG/DL
CO2 SERPL-SCNC: 29 MMOL/L
CREAT SERPL-MCNC: 1.1 MG/DL
EGFR: 83 ML/MIN/1.73M2
EOSINOPHIL # BLD AUTO: 0.04 K/UL
EOSINOPHIL NFR BLD AUTO: 0.9 %
GLUCOSE SERPL-MCNC: 84 MG/DL
HCT VFR BLD CALC: 35.6 %
HDLC SERPL-MCNC: 53 MG/DL
HGB BLD-MCNC: 10.9 G/DL
IMM GRANULOCYTES NFR BLD AUTO: 0.2 %
LDLC SERPL CALC-MCNC: 51 MG/DL
LYMPHOCYTES # BLD AUTO: 1.09 K/UL
LYMPHOCYTES NFR BLD AUTO: 24.9 %
MAN DIFF?: NORMAL
MCHC RBC-ENTMCNC: 19.6 PG
MCHC RBC-ENTMCNC: 30.6 G/DL
MCV RBC AUTO: 63.9 FL
MONOCYTES # BLD AUTO: 0.33 K/UL
MONOCYTES NFR BLD AUTO: 7.6 %
NEUTROPHILS # BLD AUTO: 2.87 K/UL
NEUTROPHILS NFR BLD AUTO: 65.7 %
NONHDLC SERPL-MCNC: 61 MG/DL
PLATELET # BLD AUTO: 156 K/UL
POTASSIUM SERPL-SCNC: 3.8 MMOL/L
PROT SERPL-MCNC: 7.6 G/DL
RBC # BLD: 5.57 M/UL
RBC # FLD: 19.3 %
SODIUM SERPL-SCNC: 144 MMOL/L
TRIGL SERPL-MCNC: 50 MG/DL
WBC # FLD AUTO: 4.37 K/UL

## 2023-03-21 ENCOUNTER — APPOINTMENT (OUTPATIENT)
Dept: NEUROLOGY | Facility: CLINIC | Age: 48
End: 2023-03-21

## 2023-03-29 NOTE — ED ADULT NURSE NOTE - CAS TRG GEN SKIN COLOR
bowel syndrome)     Insomnia     Neuropathy     Panic attack     Sleep difficulties     TMJ (dislocation of temporomandibular joint) 04/2021    Vestibular migraine     Vestibular neuritis         Past Surgical History:   Procedure Laterality Date    CATARACT REMOVAL Bilateral 02/2020    COLONOSCOPY      EGD DELIVER THERMAL ENERGY SPHNCTR/CARDIA GERD      EYE SURGERY      LIPECTOMY      TUBAL LIGATION          Results for orders placed or performed during the hospital encounter of 03/29/23   Vascular duplex lower extremity venous left    Narrative    EXAMINATION: VAS DUP LOWER EXTREMITY VENOUS LEFT 3/29/2023 10:33 AM    ACCESSION NUMBER: [id number]    COMPARISON: Right ankle and foot radiographs 3/15/2023. INDICATION: Left lower extremity swelling after a fall approximately 1 to 2  weeks ago; SWELLING OF EXTREMITIES    Doppler ultrasound of the left lower extremity was performed. FINDINGS:    Normal flow in the greater saphenous, common femoral, superficial femoral, and  popliteal veins. Normal compression and augmentation is demonstrated. The  proximal calf veins are also patent. There is ill-defined edema in the popliteal  fossa at the area of the patient's reported pain, without definite organized  fluid collection identified. Impression    No sonographic evidence of deep venous thrombosis in the left lower extremity. Vascular duplex lower extremity venous left   Final Result      No sonographic evidence of deep venous thrombosis in the left lower extremity. Voice dictation software was used during the making of this note. This software is not perfect and grammatical and other typographical errors may be present. This note has not been completely proofread for errors.      Venus Henao MD  03/29/23 [id number]
No
Normal for race

## 2023-03-31 RX ORDER — AMLODIPINE AND VALSARTAN 5; 160 MG/1; MG/1
5-160 TABLET, FILM COATED ORAL DAILY
Qty: 90 | Refills: 0 | Status: DISCONTINUED | COMMUNITY
Start: 2023-03-09 | End: 2023-03-31

## 2023-04-03 ENCOUNTER — NON-APPOINTMENT (OUTPATIENT)
Age: 48
End: 2023-04-03

## 2023-04-04 ENCOUNTER — APPOINTMENT (OUTPATIENT)
Dept: CARDIOLOGY | Facility: CLINIC | Age: 48
End: 2023-04-04
Payer: MEDICAID

## 2023-04-04 PROCEDURE — 93306 TTE W/DOPPLER COMPLETE: CPT

## 2023-04-15 ENCOUNTER — EMERGENCY (EMERGENCY)
Facility: HOSPITAL | Age: 48
LOS: 0 days | Discharge: HOME | End: 2023-04-15
Attending: EMERGENCY MEDICINE
Payer: MEDICAID

## 2023-04-15 VITALS
HEIGHT: 71 IN | RESPIRATION RATE: 18 BRPM | HEART RATE: 65 BPM | TEMPERATURE: 97 F | WEIGHT: 195.11 LBS | SYSTOLIC BLOOD PRESSURE: 177 MMHG | OXYGEN SATURATION: 100 % | DIASTOLIC BLOOD PRESSURE: 104 MMHG

## 2023-04-15 DIAGNOSIS — Z79.82 LONG TERM (CURRENT) USE OF ASPIRIN: ICD-10-CM

## 2023-04-15 DIAGNOSIS — Z79.02 LONG TERM (CURRENT) USE OF ANTITHROMBOTICS/ANTIPLATELETS: ICD-10-CM

## 2023-04-15 DIAGNOSIS — I10 ESSENTIAL (PRIMARY) HYPERTENSION: ICD-10-CM

## 2023-04-15 DIAGNOSIS — R20.2 PARESTHESIA OF SKIN: ICD-10-CM

## 2023-04-15 DIAGNOSIS — Z86.73 PERSONAL HISTORY OF TRANSIENT ISCHEMIC ATTACK (TIA), AND CEREBRAL INFARCTION WITHOUT RESIDUAL DEFICITS: ICD-10-CM

## 2023-04-15 DIAGNOSIS — R20.0 ANESTHESIA OF SKIN: ICD-10-CM

## 2023-04-15 DIAGNOSIS — R29.898 OTHER SYMPTOMS AND SIGNS INVOLVING THE MUSCULOSKELETAL SYSTEM: ICD-10-CM

## 2023-04-15 LAB
ALBUMIN SERPL ELPH-MCNC: 4.3 G/DL — SIGNIFICANT CHANGE UP (ref 3.5–5.2)
ALP SERPL-CCNC: 77 U/L — SIGNIFICANT CHANGE UP (ref 30–115)
ALT FLD-CCNC: 15 U/L — SIGNIFICANT CHANGE UP (ref 0–41)
ANION GAP SERPL CALC-SCNC: 8 MMOL/L — SIGNIFICANT CHANGE UP (ref 7–14)
ANISOCYTOSIS BLD QL: SIGNIFICANT CHANGE UP
APTT BLD: 34 SEC — SIGNIFICANT CHANGE UP (ref 27–39.2)
AST SERPL-CCNC: 19 U/L — SIGNIFICANT CHANGE UP (ref 0–41)
BASOPHILS # BLD AUTO: 0.04 K/UL — SIGNIFICANT CHANGE UP (ref 0–0.2)
BASOPHILS NFR BLD AUTO: 0.9 % — SIGNIFICANT CHANGE UP (ref 0–1)
BILIRUB SERPL-MCNC: 1.7 MG/DL — HIGH (ref 0.2–1.2)
BUN SERPL-MCNC: 16 MG/DL — SIGNIFICANT CHANGE UP (ref 10–20)
CALCIUM SERPL-MCNC: 9 MG/DL — SIGNIFICANT CHANGE UP (ref 8.4–10.5)
CHLORIDE SERPL-SCNC: 106 MMOL/L — SIGNIFICANT CHANGE UP (ref 98–110)
CO2 SERPL-SCNC: 26 MMOL/L — SIGNIFICANT CHANGE UP (ref 17–32)
CREAT SERPL-MCNC: 1 MG/DL — SIGNIFICANT CHANGE UP (ref 0.7–1.5)
DACRYOCYTES BLD QL SMEAR: SLIGHT — SIGNIFICANT CHANGE UP
EGFR: 93 ML/MIN/1.73M2 — SIGNIFICANT CHANGE UP
EOSINOPHIL # BLD AUTO: 0.1 K/UL — SIGNIFICANT CHANGE UP (ref 0–0.7)
EOSINOPHIL NFR BLD AUTO: 2.6 % — SIGNIFICANT CHANGE UP (ref 0–8)
GIANT PLATELETS BLD QL SMEAR: PRESENT — SIGNIFICANT CHANGE UP
GLUCOSE SERPL-MCNC: 92 MG/DL — SIGNIFICANT CHANGE UP (ref 70–99)
HCT VFR BLD CALC: 31.8 % — LOW (ref 42–52)
HGB BLD-MCNC: 9.9 G/DL — LOW (ref 14–18)
HYPOCHROMIA BLD QL: SIGNIFICANT CHANGE UP
INR BLD: 1.1 RATIO — SIGNIFICANT CHANGE UP (ref 0.65–1.3)
LYMPHOCYTES # BLD AUTO: 1.12 K/UL — LOW (ref 1.2–3.4)
LYMPHOCYTES # BLD AUTO: 28.4 % — SIGNIFICANT CHANGE UP (ref 20.5–51.1)
MANUAL SMEAR VERIFICATION: SIGNIFICANT CHANGE UP
MCHC RBC-ENTMCNC: 19.5 PG — LOW (ref 27–31)
MCHC RBC-ENTMCNC: 31.1 G/DL — LOW (ref 32–37)
MCV RBC AUTO: 62.6 FL — LOW (ref 80–94)
MICROCYTES BLD QL: SIGNIFICANT CHANGE UP
MONOCYTES # BLD AUTO: 0.17 K/UL — SIGNIFICANT CHANGE UP (ref 0.1–0.6)
MONOCYTES NFR BLD AUTO: 4.3 % — SIGNIFICANT CHANGE UP (ref 1.7–9.3)
NEUTROPHILS # BLD AUTO: 2.42 K/UL — SIGNIFICANT CHANGE UP (ref 1.4–6.5)
NEUTROPHILS NFR BLD AUTO: 60.3 % — SIGNIFICANT CHANGE UP (ref 42.2–75.2)
NEUTS BAND # BLD: 0.9 % — SIGNIFICANT CHANGE UP (ref 0–6)
PLAT MORPH BLD: NORMAL — SIGNIFICANT CHANGE UP
PLATELET # BLD AUTO: 140 K/UL — SIGNIFICANT CHANGE UP (ref 130–400)
PMV BLD: SIGNIFICANT CHANGE UP (ref 7.4–10.4)
POIKILOCYTOSIS BLD QL AUTO: SIGNIFICANT CHANGE UP
POLYCHROMASIA BLD QL SMEAR: SIGNIFICANT CHANGE UP
POTASSIUM SERPL-MCNC: 3.5 MMOL/L — SIGNIFICANT CHANGE UP (ref 3.5–5)
POTASSIUM SERPL-SCNC: 3.5 MMOL/L — SIGNIFICANT CHANGE UP (ref 3.5–5)
PROMYELOCYTES # FLD: 0.9 % — HIGH (ref 0–0)
PROT SERPL-MCNC: 7 G/DL — SIGNIFICANT CHANGE UP (ref 6–8)
PROTHROM AB SERPL-ACNC: 12.6 SEC — SIGNIFICANT CHANGE UP (ref 9.95–12.87)
RBC # BLD: 5.08 M/UL — SIGNIFICANT CHANGE UP (ref 4.7–6.1)
RBC # FLD: 17.4 % — HIGH (ref 11.5–14.5)
RBC BLD AUTO: ABNORMAL
SCHISTOCYTES BLD QL AUTO: SLIGHT — SIGNIFICANT CHANGE UP
SMUDGE CELLS # BLD: PRESENT — SIGNIFICANT CHANGE UP
SODIUM SERPL-SCNC: 140 MMOL/L — SIGNIFICANT CHANGE UP (ref 135–146)
TROPONIN T SERPL-MCNC: <0.01 NG/ML — SIGNIFICANT CHANGE UP
VARIANT LYMPHS # BLD: 1.7 % — SIGNIFICANT CHANGE UP (ref 0–5)
WBC # BLD: 3.96 K/UL — LOW (ref 4.8–10.8)
WBC # FLD AUTO: 3.96 K/UL — LOW (ref 4.8–10.8)

## 2023-04-15 PROCEDURE — 70496 CT ANGIOGRAPHY HEAD: CPT | Mod: 26,MA

## 2023-04-15 PROCEDURE — 99285 EMERGENCY DEPT VISIT HI MDM: CPT | Mod: 25

## 2023-04-15 PROCEDURE — 80053 COMPREHEN METABOLIC PANEL: CPT

## 2023-04-15 PROCEDURE — 99284 EMERGENCY DEPT VISIT MOD MDM: CPT

## 2023-04-15 PROCEDURE — 85610 PROTHROMBIN TIME: CPT

## 2023-04-15 PROCEDURE — 70450 CT HEAD/BRAIN W/O DYE: CPT | Mod: MA

## 2023-04-15 PROCEDURE — 70450 CT HEAD/BRAIN W/O DYE: CPT | Mod: 26,MA,59

## 2023-04-15 PROCEDURE — 36415 COLL VENOUS BLD VENIPUNCTURE: CPT

## 2023-04-15 PROCEDURE — 0042T: CPT | Mod: MA

## 2023-04-15 PROCEDURE — 85730 THROMBOPLASTIN TIME PARTIAL: CPT

## 2023-04-15 PROCEDURE — 82962 GLUCOSE BLOOD TEST: CPT

## 2023-04-15 PROCEDURE — 70498 CT ANGIOGRAPHY NECK: CPT | Mod: MA

## 2023-04-15 PROCEDURE — 70496 CT ANGIOGRAPHY HEAD: CPT | Mod: MA

## 2023-04-15 PROCEDURE — 70498 CT ANGIOGRAPHY NECK: CPT | Mod: 26,MA

## 2023-04-15 PROCEDURE — 85025 COMPLETE CBC W/AUTO DIFF WBC: CPT

## 2023-04-15 PROCEDURE — 84484 ASSAY OF TROPONIN QUANT: CPT

## 2023-04-15 NOTE — ED PROVIDER NOTE - PHYSICAL EXAMINATION
VITAL SIGNS: I have reviewed nursing notes and confirm.  CONSTITUTIONAL: well-appearing, non-toxic, NAD  SKIN: Warm dry, normal skin turgor  HEAD: NCAT  EYES: EOMI, no scleral icterus  ENT: Moist mucous membrane  NECK: Supple; non tender.  CARD: RRR, no murmurs, rubs or gallops  RESP: clear to ausculation b/l.  No rales, rhonchi, or wheezing.  ABD: soft, non-tender, non-distended, no rebound or guarding.  EXT: Full ROM, no bony tenderness, no pedal edema, no calf tenderness  NEURO: Awake, alert, oriented. PERRL, EOMI, V1-V3 intact bl, no facial droop, +bl shoulder shrug, 5/5 strength/sensory bl UE/LEs. NIH0  PSYCH: Cooperative, appropriate.

## 2023-04-15 NOTE — ED ADULT TRIAGE NOTE - CHIEF COMPLAINT QUOTE
Patient BIBA from home c/o right side facial numbness starting 20 minutes prior to   Patient is on Plavix, reports recent hx of stroke.

## 2023-04-15 NOTE — ED PROVIDER NOTE - OBJECTIVE STATEMENT
47-year-old male with past medical history of previous strokes, hypertension presents to ED for R facial numbness that started 1 hour prior to arrival and lasted 5 minutes.  Daughter reports blood pressure at home was high unsure when numbers.  Patient denies all other symptoms including focal weakness, headache, neck pain, chest pain, shortness of breath, abdominal pain, nausea, vomiting.  Upon arrival to ED patient denies numbness.

## 2023-04-15 NOTE — ED PROVIDER NOTE - ATTENDING CONTRIBUTION TO CARE
Patient here for 5 minutes of right cheek numbness.  Stroke code called by triage RN.    Exam: NIH stroke scale 0, no acute distress  Plan: labs, CT

## 2023-04-15 NOTE — CONSULT NOTE ADULT - SUBJECTIVE AND OBJECTIVE BOX
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 Patient reports his daughter took his BP as home and noted that it was high, not sure the exact numbers. Denies any other symptoms. Reports compliance with his meds since last discharge. Denies any HA, sob, abd pain. No focal weakness.     PAST MEDICAL & SURGICAL HISTORY:  HTN (hypertension)      Stroke  3 weeks ago      No significant past surgical history          FAMILY HISTORY:  FH: type 2 diabetes        Social History: (-) x 3    Allergies    No Known Allergies    Intolerances        MEDICATIONS  (STANDING):    MEDICATIONS  (PRN):      Review of systems:    As noted in HPI    Vital Signs Last 24 Hrs  T(C): 36.1 (15 Apr 2023 19:38), Max: 36.1 (15 Apr 2023 19:38)  T(F): 97 (15 Apr 2023 19:38), Max: 97 (15 Apr 2023 19:38)  HR: 65 (15 Apr 2023 19:38) (65 - 65)  BP: 177/104 (15 Apr 2023 19:38) (177/104 - 177/104)  BP(mean): --  RR: 18 (15 Apr 2023 19:38) (18 - 18)  SpO2: 100% (15 Apr 2023 19:38) (100% - 100%)    Parameters below as of 15 Apr 2023 19:38  Patient On (Oxygen Delivery Method): room air        Examination:  General:  Appearance is consistent with chronologic age.  No abnormal facies.  Gross skin survey within normal limits.    Cognitive/Language:  The patient is oriented to person, place, time and date.  Recent and remote memory intact.  Fund of knowledge is intact and normal.  Language with normal repetition, comprehension and naming.  Nondysarthric.    Eyes: intact VA, VFF.  EOMI w/o nystagmus, skew or reported double vision.  PERRL.  No ptosis/weakness of eyelid closure.    Face:  Facial sensation normal V1 - 3, no facial asymmetry.    Ears/Nose/Throat:  Hearing grossly intact b/l.  Palate elevates midline.  Tongue and uvula midline.   Motor examination:   Normal tone, bulk and range of motion.  No tenderness, twitching, tremors or involuntary movements.  Formal Muscle Strength Testing: (MRC grade R/L) 5/5 UE; 5/5 LE.  No observable drift.  Reflexes:   2+ b/l pectoralis, biceps, triceps, brachioradialis, patella and Achilles.  Plantar response downgoing b/l.  clonus absent.  Sensory examination:   Intact to light touch in all extremities.  Cerebellum:   FTN intact with normal LAUREL in all limbs.  No dysmetria or dysdiadokinesia.  Gait deferred    Respiratory:  no audible wheezing or inspiratory stridor.  no use of accessory muscles.   Cardiac: pulse palpable, no audible bruits  Abdomen: supple, no guarding, no TTP    Labs:   CBC Full  -  ( 15 Apr 2023 20:10 )  WBC Count : 3.96 K/uL  RBC Count : 5.08 M/uL  Hemoglobin : 9.9 g/dL  Hematocrit : 31.8 %  Platelet Count - Automated : 140 K/uL  Mean Cell Volume : 62.6 fL  Mean Cell Hemoglobin : 19.5 pg  Mean Cell Hemoglobin Concentration : 31.1 g/dL  Auto Neutrophil # : 2.42 K/uL  Auto Lymphocyte # : 1.12 K/uL  Auto Monocyte # : 0.17 K/uL  Auto Eosinophil # : 0.10 K/uL  Auto Basophil # : 0.04 K/uL  Auto Neutrophil % : 60.3 %  Auto Lymphocyte % : 28.4 %  Auto Monocyte % : 4.3 %  Auto Eosinophil % : 2.6 %  Auto Basophil % : 0.9 %    04-15    140  |  106  |  16  ----------------------------<  92  3.5   |  26  |  1.0    Ca    9.0      15 Apr 2023 20:10    TPro  7.0  /  Alb  4.3  /  TBili  1.7<H>  /  DBili  x   /  AST  19  /  ALT  15  /  AlkPhos  77  04-15    LIVER FUNCTIONS - ( 15 Apr 2023 20:10 )  Alb: 4.3 g/dL / Pro: 7.0 g/dL / ALK PHOS: 77 U/L / ALT: 15 U/L / AST: 19 U/L / GGT: x           PT/INR - ( 15 Apr 2023 20:10 )   PT: 12.60 sec;   INR: 1.10 ratio         PTT - ( 15 Apr 2023 20:10 )  PTT:34.0 sec        [facility]     04-[facility]-23 @ 20:58

## 2023-04-15 NOTE — CONSULT NOTE ADULT - ASSESSMENT
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 s/p stroke code NIHSS 0. Patient symptoms appear to most likely be from symptomatic left M2 stenosis. He reports compliance with DAPT    Recommendations  - Patient does not want to stay for further testing/monitoring  - Stroke Education  - Compliance with DAPT and lipitor reinforced  - Pt can follow up outpatient at neuro clinic for PRU testing  - BP monitoring, consider increasing amlodipine to 5mg daily      Discussed with tele stroke

## 2023-04-15 NOTE — ED ADULT NURSE NOTE - OBJECTIVE STATEMENT
presented to ED c/o R sided facial numbness that began 18:50. LKW was 18:45. Pt states symptoms subsided on their own when brought to the hospital. Pt states his blood pressure was high at the time.

## 2023-04-15 NOTE — ED PROVIDER NOTE - PATIENT PORTAL LINK FT
You can access the FollowMyHealth Patient Portal offered by [facility] by registering at the following website: [email or web address]. By joining [email or web address]’s FollowMyHealth portal, you will also be able to view your health information using other applications (apps) compatible with our system.

## 2023-04-15 NOTE — ED PROVIDER NOTE - NSFOLLOWUPCLINICS_GEN_ALL_ED_FT
[facility]  Neurology  [address] [place] [address], [facility] [address]  [place] [address]  Phone: [phone]  Fax:   Follow Up Time: Routine

## 2023-04-21 ENCOUNTER — APPOINTMENT (OUTPATIENT)
Dept: NEUROLOGY | Facility: CLINIC | Age: 48
End: 2023-04-21

## 2023-05-05 ENCOUNTER — NON-APPOINTMENT (OUTPATIENT)
Age: 48
End: 2023-05-05

## 2023-05-05 ENCOUNTER — EMERGENCY (EMERGENCY)
Facility: HOSPITAL | Age: 48
LOS: 0 days | Discharge: LEFT BEFORE DISCHARGE | End: 2023-05-05
Attending: EMERGENCY MEDICINE
Payer: MEDICAID

## 2023-05-05 ENCOUNTER — EMERGENCY (EMERGENCY)
Facility: HOSPITAL | Age: 48
LOS: 0 days | Discharge: HOME | End: 2023-05-06
Attending: EMERGENCY MEDICINE
Payer: MEDICAID

## 2023-05-05 VITALS
HEIGHT: 71 IN | HEART RATE: 67 BPM | OXYGEN SATURATION: 100 % | TEMPERATURE: 98 F | DIASTOLIC BLOOD PRESSURE: 93 MMHG | SYSTOLIC BLOOD PRESSURE: 147 MMHG

## 2023-05-05 VITALS
SYSTOLIC BLOOD PRESSURE: 165 MMHG | DIASTOLIC BLOOD PRESSURE: 102 MMHG | TEMPERATURE: 96 F | HEIGHT: 71 IN | RESPIRATION RATE: 18 BRPM | WEIGHT: 195.11 LBS | HEART RATE: 65 BPM | OXYGEN SATURATION: 100 %

## 2023-05-05 DIAGNOSIS — R40.4 TRANSIENT ALTERATION OF AWARENESS: ICD-10-CM

## 2023-05-05 DIAGNOSIS — Z79.82 LONG TERM (CURRENT) USE OF ASPIRIN: ICD-10-CM

## 2023-05-05 DIAGNOSIS — Z53.29 PROCEDURE AND TREATMENT NOT CARRIED OUT BECAUSE OF PATIENT'S DECISION FOR OTHER REASONS: ICD-10-CM

## 2023-05-05 DIAGNOSIS — M79.621 PAIN IN RIGHT UPPER ARM: ICD-10-CM

## 2023-05-05 DIAGNOSIS — R53.1 WEAKNESS: ICD-10-CM

## 2023-05-05 DIAGNOSIS — M25.531 PAIN IN RIGHT WRIST: ICD-10-CM

## 2023-05-05 DIAGNOSIS — Z79.02 LONG TERM (CURRENT) USE OF ANTITHROMBOTICS/ANTIPLATELETS: ICD-10-CM

## 2023-05-05 DIAGNOSIS — Z86.73 PERSONAL HISTORY OF TRANSIENT ISCHEMIC ATTACK (TIA), AND CEREBRAL INFARCTION WITHOUT RESIDUAL DEFICITS: ICD-10-CM

## 2023-05-05 DIAGNOSIS — R47.81 SLURRED SPEECH: ICD-10-CM

## 2023-05-05 DIAGNOSIS — R20.2 PARESTHESIA OF SKIN: ICD-10-CM

## 2023-05-05 DIAGNOSIS — I10 ESSENTIAL (PRIMARY) HYPERTENSION: ICD-10-CM

## 2023-05-05 PROCEDURE — 84484 ASSAY OF TROPONIN QUANT: CPT

## 2023-05-05 PROCEDURE — 36415 COLL VENOUS BLD VENIPUNCTURE: CPT

## 2023-05-05 PROCEDURE — 99283 EMERGENCY DEPT VISIT LOW MDM: CPT | Mod: 25

## 2023-05-05 PROCEDURE — 85730 THROMBOPLASTIN TIME PARTIAL: CPT

## 2023-05-05 PROCEDURE — 99285 EMERGENCY DEPT VISIT HI MDM: CPT

## 2023-05-05 PROCEDURE — 85025 COMPLETE CBC W/AUTO DIFF WBC: CPT

## 2023-05-05 PROCEDURE — 70496 CT ANGIOGRAPHY HEAD: CPT | Mod: MA

## 2023-05-05 PROCEDURE — 93005 ELECTROCARDIOGRAM TRACING: CPT

## 2023-05-05 PROCEDURE — 85610 PROTHROMBIN TIME: CPT

## 2023-05-05 PROCEDURE — 96372 THER/PROPH/DIAG INJ SC/IM: CPT

## 2023-05-05 PROCEDURE — 70498 CT ANGIOGRAPHY NECK: CPT | Mod: MA

## 2023-05-05 PROCEDURE — 99285 EMERGENCY DEPT VISIT HI MDM: CPT | Mod: 25

## 2023-05-05 PROCEDURE — 70450 CT HEAD/BRAIN W/O DYE: CPT | Mod: MA

## 2023-05-05 PROCEDURE — 80053 COMPREHEN METABOLIC PANEL: CPT

## 2023-05-05 PROCEDURE — 99284 EMERGENCY DEPT VISIT MOD MDM: CPT

## 2023-05-05 RX ORDER — KETOROLAC TROMETHAMINE 30 MG/ML
15 SYRINGE (ML) INJECTION ONCE
Refills: 0 | Status: DISCONTINUED | OUTPATIENT
Start: 2023-05-05 | End: 2023-05-05

## 2023-05-05 RX ADMIN — Medication 15 MILLIGRAM(S): at 13:08

## 2023-05-05 NOTE — ED PROVIDER NOTE - PATIENT PORTAL LINK FT
You can access the FollowMyHealth Patient Portal offered by [facility] by registering at the following website: [email or web address]. By joining [facility]’s FollowMyHealth portal, you will also be able to view your health information using other applications (apps) compatible with our system.

## 2023-05-05 NOTE — ED PROVIDER NOTE - OBJECTIVE STATEMENT
Patient is a 47-year-old man prior history of TIA, hypertension, never smoker.  Patient presenting for weakness of the right wrist and hand since 5/4 at 9 AM (36+ hours).  Patient is right-hand dominant, and denies difficulty with  of his right hand.  No trauma or injury sustained.  Patient was seen in the ED earlier today, but had to leave AMA as he needed to attend to his children. No head trauma or injury.  No weakness of other extremities.  No numbness. No other complaints - no fever/chills, CP, palpitations, SOB, cough, abd pain, NVD, dysuria, hematuria, rash.

## 2023-05-05 NOTE — ED ADULT TRIAGE NOTE - CHIEF COMPLAINT QUOTE
Pt came c/o right forearm and hand tingling since 9 am 5/4/2023, was in ER this morning for the same issue but had to leave to take care of the children.

## 2023-05-05 NOTE — ED PROVIDER NOTE - PATIENT PORTAL LINK FT
You can access the FollowMyHealth Patient Portal offered by [facility] by registering at the following website: [email or web address]. By joining [facility]’s FollowMyHealth portal, you will also be able to view your health information using other applications (apps) compatible with our system.

## 2023-05-05 NOTE — ED PROVIDER NOTE - NSFOLLOWUPINSTRUCTIONS_ED_ALL_ED_FT
Return to the ER if you change your mind.  Take your medications as directed, follow up with your neurologist.

## 2023-05-05 NOTE — ED PROVIDER NOTE - PROGRESS NOTE DETAILS
Resident AO: CTH noncon without obvious abnormalities. Neuro consulted. Resident AO: Had an extensive discussion with the patient regarding further evaluation with neurology, but patient states that he has 16 kids and cannot stay in the hospital.  He wishes to leave AMA. The patient wishes to leave against medical advice.  I have discussed the risks, benefits and alternatives (including the possibility of worsening of disease, pain, permanent disability, and/or death) with the patient and his/her family (if available).  The patient voices understanding of these risks, benefits, and alternatives and still wishes to sign out against medical advice.  The patient is awake, alert, oriented  x 3 and has demonstrated capacity to refuse/direct care.  I have advised the patient that they can and should return immediately should they develop any worse/different/additional symptoms, or if they change their mind and want to continue their care.

## 2023-05-05 NOTE — ED ADULT TRIAGE NOTE - CHIEF COMPLAINT QUOTE
pt BIBEMS with children patients with tingling right hand starting yesterday. Pt denies dizziness, cp, weakness
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
## 2023-05-05 NOTE — ED PROVIDER NOTE - CLINICAL SUMMARY MEDICAL DECISION MAKING FREE TEXT BOX
Patient presents for evaluation of right hand weakness and paresthesias.  Required EKG, labs.  Ortho consulted and they recommended an MRI of the brain to be done.  Patient declined pain in the ED as he has young kids that he has to go take care of.  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  Discussed with patient that he has had multiple TIAs and this is consistent with a TIA versus stroke.  States that neurology recommended aspirin and for further evaluation.  Patient declined further ED work-up.  Will discharge AGAINST MEDICAL ADVICE with outpatient follow-up.      The patient wishes to leave against medical advice.  I have discussed the risks, benefits and alternatives (including the possibility of worsening of disease, pain, permanent disability, and/or death) with the patient and his/her family (if available).  The patient voices understanding of these risks, benefits, and alternatives and still wishes to sign out against medical advice.  The patient is awake, alert, oriented  x 3 and has demonstrated capacity to refuse/direct care.  I have advised the patient that they can and should return immediately should they develop any worse/different/additional symptoms, or if they change their mind and want to continue their care.

## 2023-05-05 NOTE — ED PROVIDER NOTE - CARE PLAN
1 Principal Discharge DX:	Arm pain  Assessment and plan of treatment:	RUE pain/weakness/paresthia  supportive care  AMA  Secondary Diagnosis:	Paresthesia

## 2023-05-05 NOTE — ED PROVIDER NOTE - NSFOLLOWUPINSTRUCTIONS_ED_ALL_ED_FT
You decided to leave AGAINST MEDICAL ADVICE TODAY. Please return to the emergency department if you change your mind or develop worsening symptoms.    Please follow up with Neurology (Dr. Chadwick).     You may try Physical Therapy. Our Emergency Department Referral Coordinators will be reaching out to you in the next 24-48 hours from 9:00am to 5:00pm with a follow up appointment(s). Please expect a phone call from the hospital in that time frame. If you do not receive a call or if you have any questions or concerns, you can reach them at [phone].

## 2023-05-05 NOTE — ED ADULT NURSE NOTE - NSIMPLEMENTINTERV_GEN_ALL_ED
Implemented All Universal Safety Interventions:  [place] to call system. Call bell, personal items and telephone within reach. Instruct patient to call for assistance. Room bathroom lighting operational. Non-slip footwear when patient is off stretcher. Physically safe environment: no spills, clutter or unnecessary equipment. Stretcher in lowest position, wheels locked, appropriate side rails in place.

## 2023-05-05 NOTE — ED ADULT NURSE NOTE - OBJECTIVE STATEMENT
c/o right forearm and hand tingling since 9 am 5/4/2023, was in ER this morning for the same issue but had to leave to take care of the children.

## 2023-05-05 NOTE — ED PROVIDER NOTE - WET READ LAUNCH FT
Pt states current meds are:    ASA 81 mg daily  Atorvastatin 80 mg daily  HCTZ 25 mg daily  Irbesartan 300 mg daily  Metoprolol Tartrate 25 mg BID  Warfarin 5 mg Take as directed  Ezetimibe 10 mg daily  Acetaminophen 325 mg PRN    Pt agreeable to continue current medication regimen and have BP checked on 1/22 in office for any further orders.    Will update Dr. Patrick.   There are no Wet Read(s) to document.

## 2023-05-05 NOTE — ED PROVIDER NOTE - PHYSICAL EXAMINATION
VITAL SIGNS: I have reviewed nursing notes and confirm.  CONSTITUTIONAL: Well-developed; well-nourished; in no acute distress.  SKIN: Skin exam is warm and dry, no acute rash.  HEAD: Normocephalic; atraumatic.  EYES: PERRL, EOM intact; conjunctiva and sclera clear.  ENT: No nasal discharge; airway clear.   NECK: Supple; non tender. no bruits or thrills.  CARD:+ S1, S2   RESP: No wheezes, rales or rhonchi.  ABD: Normal bowel sounds; soft; non-distended; non-tender;  EXT: Normal ROM. No cyanosis or edema.  LYMPH: No acute adenopathy.  NEURO: Alert. Dec sensation of sharp touch to R hand, fingers. stg 4/5 R, 5/5 L. no drift. no ataxia. nml ftn and coordination. gait nml.  No focal deficits.  PSYCH: Cooperative, appropriate.

## 2023-05-05 NOTE — ED PROVIDER NOTE - ATTENDING CONTRIBUTION TO CARE
I personally evaluated the patient. I reviewed the Resident’s or Physician Assistant’s note (as assigned above), and agree with the findings and plan except as documented in my note.  47-year-old male above-mentioned history was brought in for evaluation of about 2 days of right hand weakness and associated paresthesias.  enies dizziness, nausea, vomiting, chest pain.  VSS, non toxic appearing, NAD, Head NCAT, MMM, neck supple, normal ROM, normal s1s2, lungs ctab, abd s/nt/nd, no guarding or rebound, extremities wnl, AAO x 3, GCS 15, CN II through XII within normal limits, decreased motor in the right upper extremity, left and bilateral lower extremity within normal limits.  Sensation intact bilaterally, cerebellar exam within normal limits, normal gait. No acute skin lesions. Plan is EKG, labs, imaging, neuro consult and dispo accordingly.

## 2023-05-05 NOTE — ED ADULT TRIAGE NOTE - NS ED NURSE BANDS TYPE
PDMP reviewed; no aberrant behavior identified, prescription authorized. Prescription sent to preferred pharmacy.     Name band;

## 2023-05-05 NOTE — ED PROVIDER NOTE - PROGRESS NOTE DETAILS
pt is here with his 2 kids, he sts he can not stay for evaluation, would like something for his arm pain, and then will take his kids home and return for further eval.  r/b/a explained. will MARIA.

## 2023-05-05 NOTE — ED PROVIDER NOTE - OBJECTIVE STATEMENT
48 yo m hx of tia, recent stroke eval, c/o R wrist/hand pain, tingling and weakness that started yesterday at 9am. no ha, vis or speech changes, no n, v, cp, sob, ab pain. pt is taking asa, statin and has neurology outpt f/u.

## 2023-05-06 VITALS
DIASTOLIC BLOOD PRESSURE: 103 MMHG | SYSTOLIC BLOOD PRESSURE: 167 MMHG | TEMPERATURE: 96 F | OXYGEN SATURATION: 99 % | HEART RATE: 68 BPM | RESPIRATION RATE: 18 BRPM

## 2023-05-06 LAB
ALBUMIN SERPL ELPH-MCNC: 4.1 G/DL — SIGNIFICANT CHANGE UP (ref 3.5–5.2)
ALP SERPL-CCNC: 67 U/L — SIGNIFICANT CHANGE UP (ref 30–115)
ALT FLD-CCNC: 17 U/L — SIGNIFICANT CHANGE UP (ref 0–41)
ANION GAP SERPL CALC-SCNC: 10 MMOL/L — SIGNIFICANT CHANGE UP (ref 7–14)
APTT BLD: 34.3 SEC — SIGNIFICANT CHANGE UP (ref 27–39.2)
AST SERPL-CCNC: 17 U/L — SIGNIFICANT CHANGE UP (ref 0–41)
BASOPHILS # BLD AUTO: 0.03 K/UL — SIGNIFICANT CHANGE UP (ref 0–0.2)
BASOPHILS NFR BLD AUTO: 0.7 % — SIGNIFICANT CHANGE UP (ref 0–1)
BILIRUB SERPL-MCNC: 1.4 MG/DL — HIGH (ref 0.2–1.2)
BUN SERPL-MCNC: 22 MG/DL — HIGH (ref 10–20)
CALCIUM SERPL-MCNC: 8.8 MG/DL — SIGNIFICANT CHANGE UP (ref 8.4–10.5)
CHLORIDE SERPL-SCNC: 108 MMOL/L — SIGNIFICANT CHANGE UP (ref 98–110)
CO2 SERPL-SCNC: 24 MMOL/L — SIGNIFICANT CHANGE UP (ref 17–32)
CREAT SERPL-MCNC: 1.1 MG/DL — SIGNIFICANT CHANGE UP (ref 0.7–1.5)
EGFR: 83 ML/MIN/1.73M2 — SIGNIFICANT CHANGE UP
EOSINOPHIL # BLD AUTO: 0.06 K/UL — SIGNIFICANT CHANGE UP (ref 0–0.7)
EOSINOPHIL NFR BLD AUTO: 1.4 % — SIGNIFICANT CHANGE UP (ref 0–8)
GLUCOSE SERPL-MCNC: 122 MG/DL — HIGH (ref 70–99)
HCT VFR BLD CALC: 31.9 % — LOW (ref 42–52)
HGB BLD-MCNC: 9.9 G/DL — LOW (ref 14–18)
IMM GRANULOCYTES NFR BLD AUTO: 0.2 % — SIGNIFICANT CHANGE UP (ref 0.1–0.3)
INR BLD: 1.13 RATIO — SIGNIFICANT CHANGE UP (ref 0.65–1.3)
LYMPHOCYTES # BLD AUTO: 1.46 K/UL — SIGNIFICANT CHANGE UP (ref 1.2–3.4)
LYMPHOCYTES # BLD AUTO: 33.7 % — SIGNIFICANT CHANGE UP (ref 20.5–51.1)
MCHC RBC-ENTMCNC: 19.4 PG — LOW (ref 27–31)
MCHC RBC-ENTMCNC: 31 G/DL — LOW (ref 32–37)
MCV RBC AUTO: 62.7 FL — LOW (ref 80–94)
MONOCYTES # BLD AUTO: 0.34 K/UL — SIGNIFICANT CHANGE UP (ref 0.1–0.6)
MONOCYTES NFR BLD AUTO: 7.9 % — SIGNIFICANT CHANGE UP (ref 1.7–9.3)
NEUTROPHILS # BLD AUTO: 2.43 K/UL — SIGNIFICANT CHANGE UP (ref 1.4–6.5)
NEUTROPHILS NFR BLD AUTO: 56.1 % — SIGNIFICANT CHANGE UP (ref 42.2–75.2)
NRBC # BLD: 0 /100 WBCS — SIGNIFICANT CHANGE UP (ref 0–0)
PLATELET # BLD AUTO: 152 K/UL — SIGNIFICANT CHANGE UP (ref 130–400)
PMV BLD: SIGNIFICANT CHANGE UP (ref 7.4–10.4)
POTASSIUM SERPL-MCNC: 3.7 MMOL/L — SIGNIFICANT CHANGE UP (ref 3.5–5)
POTASSIUM SERPL-SCNC: 3.7 MMOL/L — SIGNIFICANT CHANGE UP (ref 3.5–5)
PROT SERPL-MCNC: 6.7 G/DL — SIGNIFICANT CHANGE UP (ref 6–8)
PROTHROM AB SERPL-ACNC: 12.9 SEC — HIGH (ref 9.95–12.87)
RBC # BLD: 5.09 M/UL — SIGNIFICANT CHANGE UP (ref 4.7–6.1)
RBC # FLD: 18.5 % — HIGH (ref 11.5–14.5)
SODIUM SERPL-SCNC: 142 MMOL/L — SIGNIFICANT CHANGE UP (ref 135–146)
TROPONIN T SERPL-MCNC: <0.01 NG/ML — SIGNIFICANT CHANGE UP
WBC # BLD: 4.33 K/UL — LOW (ref 4.8–10.8)
WBC # FLD AUTO: 4.33 K/UL — LOW (ref 4.8–10.8)

## 2023-05-06 PROCEDURE — 70496 CT ANGIOGRAPHY HEAD: CPT | Mod: 26,MA

## 2023-05-06 PROCEDURE — 70498 CT ANGIOGRAPHY NECK: CPT | Mod: 26,MA

## 2023-05-06 PROCEDURE — 93010 ELECTROCARDIOGRAM REPORT: CPT

## 2023-05-06 PROCEDURE — 70450 CT HEAD/BRAIN W/O DYE: CPT | Mod: 26,MA,59

## 2023-05-06 NOTE — CONSULT NOTE ADULT - ASSESSMENT
47M with a PMH of 2 strokes (1. Aug 2019, presented as L sided weakness and decreased level of consciousness, received tpa; 2. Feb 2023, presented as right facial droop and slurred speech, found to have L post IC infarct), HTN (diagnosed at 23yo), and splenic laceration, presents for weakness of right wrist extension since 5/4 at 0900 (36+ hours). Patient was seen in the ED earlier on 5/5, but had to leave AMA as he needed to attend to his children, so there was no work up completed. Of note, in 2/2023 patient was originally evaluated for stroke at [facility] but left AMA. He presented to [facility] shortly after where the left IC infarct was discovered, but after 3 days, he was unable to complete work up due to  complications. He presented back to [facility] on 4/2023 for right facial numbness but did not want to stay for admission, so again workup was ended prematurely. CTH performed today wqas negative for acute abnormality but showed stable chronic left posterior limb internal capsule infarcts. Neurology was consulted for further evaluation.    Impression  - Patient presented with new RUE symptoms: weakness in elbow flexion and extension and weakness in finger extension. He denies any residual deficits from prior strokes. As such, I explained that his new symptoms warrant admission for work up of new stroke (including MRI and Echo). Patient said he can not stay because his son is with him in bed and he has no one else to care for him. I explained that he would likely have to leave AM.    Recommendations  - Can admit to Neurology for stroke work up if patient is willing to stay. Otherwise, he must leave A  - F/u CTA H/N official read  - Obtain MR Head noncon  - Obtain Echo with bubble study  - Continue ASA 81mg daily and Atorvastatin 80mg daily  - Add Plavix 75mg daily  - Stroke education  - Patient currently follows with Dr. Chadwick outpatient. If he leaves [place], he should schedule a follow up appointment with Neurology

## 2023-05-06 NOTE — CONSULT NOTE ADULT - SUBJECTIVE AND OBJECTIVE BOX
Neurology Consult    Patient is a 47M with a PMH of 2 strokes (1. Aug 2019, presented as L sided weakness and decreased level of consciousness, received tpa; 2. Feb 2023, presented as right facial droop and slurred speech, found to have L post IC infarct), HTN (diagnosed at 25yo), and splenic laceration, presents for weakness of right wrist extension since 5/4 at 0900 (36+ hours). Patient was seen in the ED earlier on 5/5, but had to leave AMA as he needed to attend to his children, so there was no work up completed. Of note, in 2/2023 patient was originally evaluated for stroke at [facility] but left AMA. He presented to [facility] shortly after where the left IC infarct was discovered, but after 3 days, he was unable to complete work up due to  complications. He presented back to [facility] on 4/2023 for right facial numbness but did not want to stay for admission, so again workup was ended prematurely. CTH performed today wqas negative for acute abnormality but showed stable chronic left posterior limb internal capsule infarcts. Neurology was consulted for further evaluation.      PAST MEDICAL & SURGICAL HISTORY:  HTN (hypertension)      Stroke  3 weeks ago      No significant past surgical history          FAMILY HISTORY:  FH: type 2 diabetes        Social History: (-) x 3    Allergies    No Known Allergies    Intolerances        MEDICATIONS  (STANDING):    MEDICATIONS  (PRN):      Review of systems:    Constitutional: as per HPI  Eyes: No eye pain or discharge  ENMT:  No difficulty hearing; No sinus or throat pain  Neck: No pain or stiffness  Respiratory: No cough, wheezing, chills or hemoptysis  Cardiovascular: No chest pain, palpitations, shortness of breath, dyspnea on exertion  Gastrointestinal: No abdominal pain, nausea, vomiting or hematemesis; No diarrhea or constipation.   Genitourinary: No dysuria, frequency, hematuria or incontinence  Neurological: As per HPI  Skin: No rashes or lesions   Endocrine: No heat or cold intolerance; No hair loss  Musculoskeletal: No joint pain or swelling  Psychiatric: No depression, anxiety, mood swings  Heme/Lymph: No easy bruising or bleeding gums    Vital Signs Last 24 Hrs  T(C): 35.6 (06 May 2023 00:17), Max: 36.7 (05 May 2023 11:28)  T(F): 96 (06 May 2023 00:17), Max: 98.1 (05 May 2023 11:28)  HR: 68 (06 May 2023 00:17) (65 - 68)  BP: 167/103 (06 May 2023 00:17) (147/93 - 167/103)  BP(mean): --  RR: 18 (06 May 2023 00:17) (18 - 18)  SpO2: 99% (06 May 2023 00:17) (99% - 100%)    Parameters below as of 06 May 2023 00:17  Patient On (Oxygen Delivery Method): room air          Neurological Examination:  General:  Appearance is consistent with chronologic age.  No abnormal facies.  Gross skin survey within normal limits.    Cognitive/Language:  Awake, alert, and oriented to person, place, time and date.  Recent and remote memory intact.  Fund of knowledge is appropriate.  Naming, repetition and comprehension intact.   Nondysarthric.    Cranial Nerves  - Eyes: Visual acuity intact, Visual fields full.  EOMI w/o nystagmus, skew or reported double vision.  PERRL.  No ptosis/weakness of eyelid closure.    - Face:  Facial sensation normal V1 - 3, no facial asymmetry.    - Ears/Nose/Throat:  Hearing grossly intact b/l to finger rub.  Palate elevates midline.  Tongue and uvula midline.   Motor examination:  Upper Extremities: L 5/5, R 5/5; Lower extremities: L 5/5, R 5/5.  No observable drift. Normal tone and bulk. No tenderness, twitching, tremors or involuntary movements.  Sensory examination:   Intact to light touch and pinprick, pain, temperature and proprioception and vibration in all extremities.  Reflexes:   2+ b/l biceps, triceps, brachioradialis, patella and achilles.  Plantar response downgoing b/l.  Jaw jerk, Love, clonus absent.  Cerebellum:   FTN/HKS intact.  No dysmetria or dysdiadokinesia.  Gait narrow based and normal.      Labs:   CBC Full  -  ( 06 May 2023 00:39 )  WBC Count : 4.33 K/uL  RBC Count : 5.09 M/uL  Hemoglobin : 9.9 g/dL  Hematocrit : 31.9 %  Platelet Count - Automated : 152 K/uL  Mean Cell Volume : 62.7 fL  Mean Cell Hemoglobin : 19.4 pg  Mean Cell Hemoglobin Concentration : 31.0 g/dL  Auto Neutrophil # : 2.43 K/uL  Auto Lymphocyte # : 1.46 K/uL  Auto Monocyte # : 0.34 K/uL  Auto Eosinophil # : 0.06 K/uL  Auto Basophil # : 0.03 K/uL  Auto Neutrophil % : 56.1 %  Auto Lymphocyte % : 33.7 %  Auto Monocyte % : 7.9 %  Auto Eosinophil % : 1.4 %  Auto Basophil % : 0.7 %    05-06    142  |  108  |  22<H>  ----------------------------<  122<H>  3.7   |  24  |  1.1    Ca    8.8      06 May 2023 00:39    TPro  6.7  /  Alb  4.1  /  TBili  1.4<H>  /  DBili  x   /  AST  17  /  ALT  17  /  AlkPhos  67  05-06    LIVER FUNCTIONS - ( 06 May 2023 00:39 )  Alb: 4.1 g/dL / Pro: 6.7 g/dL / ALK PHOS: 67 U/L / ALT: 17 U/L / AST: 17 U/L / GGT: x           PT/INR - ( 06 May 2023 00:39 )   PT: 12.90 sec;   INR: 1.13 ratio         PTT - ( 06 May 2023 00:39 )  PTT:34.3 sec        Neuroimaging:  NCHCT: CT Head No Cont:   ACC: [id number] EXAM:  CT BRAIN   ORDERED BY: PREM BACON     PROCEDURE DATE:  05/06/2023          INTERPRETATION:  CLINICAL HISTORY: Right upper extremity weakness    COMPARISON: CT head 4/15/2023 and MRI brain 2/22/2023.    TECHNIQUE: Multiple axial CT images of the head were obtained with   sagittal and coronal reformats without the administration of IV contrast.      FINDINGS:  Redemonstration of hypodensity in the left posterior limb internal   capsule reflecting chronic ischemic infarct. Previously described chronic   lacunar infarcts in the right thalamus and chelly are better seen on MRI   brain 2/22/2023.    Ventricles and cortical sulcal pattern are within normal limits.    No acute acute territorial infarct or intracranial hemorrhage, midline   shift, or space-occupying lesion.    No depressed calvarial fractures. The mastoid air cells are aerated   bilaterally. Polyp versus mucosal retention cyst in the left maxillary   sinus.      IMPRESSION:  1. No acute intracranial pathology.  2. Stable chronic left posterior limb internal capsule infarct.    --- End of Report ---            ADY JEFERSON MD; Attending Radiologist  This document has been electronically signed. May  6 2023  1:14AM (05-06-23 @ 01:07)      05-06-23 @ 02:01       Neurology Consult    Patient is a 47M with a PMH of 2 strokes (1. Aug 2019, presented as L sided weakness and decreased level of consciousness, received tpa; 2. Feb 2023, presented as right facial droop and slurred speech, found to have L post IC infarct), HTN (diagnosed at 23yo), and splenic laceration, presents for weakness of right wrist extension since 5/4 at 0900 (36+ hours). Patient was seen in the ED earlier on 5/5, but had to leave AMA as he needed to attend to his children, so there was no work up completed. Of note, in 2/2023 patient was originally evaluated for stroke at [facility] but left AMA. He presented to [facility] shortly after where the left IC infarct was discovered, but after 3 days, he was unable to complete work up due to  complications. He presented back to [facility] on 4/2023 for right facial numbness but did not want to stay for admission, so again workup was ended prematurely. CTH performed today wqas negative for acute abnormality but showed stable chronic left posterior limb internal capsule infarcts. Neurology was consulted for further evaluation.      PAST MEDICAL & SURGICAL HISTORY:  HTN (hypertension)      Stroke  3 weeks ago      No significant past surgical history          FAMILY HISTORY:  FH: type 2 diabetes        Social History: (-) x 3    Allergies    No Known Allergies    Intolerances        MEDICATIONS  (STANDING):    MEDICATIONS  (PRN):      Review of systems:    Constitutional: as per HPI  Eyes: No eye pain or discharge  ENMT:  No difficulty hearing; No sinus or throat pain  Neck: No pain or stiffness  Respiratory: No cough, wheezing, chills or hemoptysis  Cardiovascular: No chest pain, palpitations, shortness of breath, dyspnea on exertion  Gastrointestinal: No abdominal pain, nausea, vomiting or hematemesis; No diarrhea or constipation.   Genitourinary: No dysuria, frequency, hematuria or incontinence  Neurological: As per HPI  Skin: No rashes or lesions   Endocrine: No heat or cold intolerance; No hair loss  Musculoskeletal: No joint pain or swelling  Psychiatric: No depression, anxiety, mood swings  Heme/Lymph: No easy bruising or bleeding gums    Vital Signs Last 24 Hrs  T(C): 35.6 (06 May 2023 00:17), Max: 36.7 (05 May 2023 11:28)  T(F): 96 (06 May 2023 00:17), Max: 98.1 (05 May 2023 11:28)  HR: 68 (06 May 2023 00:17) (65 - 68)  BP: 167/103 (06 May 2023 00:17) (147/93 - 167/103)  BP(mean): --  RR: 18 (06 May 2023 00:17) (18 - 18)  SpO2: 99% (06 May 2023 00:17) (99% - 100%)    Parameters below as of 06 May 2023 00:17  Patient On (Oxygen Delivery Method): room air          Neurological Examination:  General:  Appearance is consistent with chronologic age.  No abnormal facies.  Gross skin survey within normal limits.    Cognitive/Language:  Awake, alert, and oriented to person, place, time and date.  Recent and remote memory intact.  Nondysarthric.    Cranial Nerves  - Eyes: Visual fields full.  EOMI w/o nystagmus, skew or reported double vision. No ptosis/weakness of eyelid closure.    - Face:  Facial sensation normal V1 - 3, no facial asymmetry.    - Ears/Nose/Throat:  Hearing grossly intact   Motor examination:  Upper Extremities: RIGHT biceps 4+/5, triceps 4+/5, finger extension 3/5, finger abduction 3/5, LEFT 5/5; Lower extremities: RIGHT 5/5, LEFT 5/5.  No observable drift. Normal tone and bulk. No tenderness, twitching, tremors or involuntary movements.  Sensory examination:   Intact to light touch throughout  Cerebellum:   FTN/HKS intact.  No dysmetria or dysdiadokinesia.  Gait deferred      Labs:   CBC Full  -  ( 06 May 2023 00:39 )  WBC Count : 4.33 K/uL  RBC Count : 5.09 M/uL  Hemoglobin : 9.9 g/dL  Hematocrit : 31.9 %  Platelet Count - Automated : 152 K/uL  Mean Cell Volume : 62.7 fL  Mean Cell Hemoglobin : 19.4 pg  Mean Cell Hemoglobin Concentration : 31.0 g/dL  Auto Neutrophil # : 2.43 K/uL  Auto Lymphocyte # : 1.46 K/uL  Auto Monocyte # : 0.34 K/uL  Auto Eosinophil # : 0.06 K/uL  Auto Basophil # : 0.03 K/uL  Auto Neutrophil % : 56.1 %  Auto Lymphocyte % : 33.7 %  Auto Monocyte % : 7.9 %  Auto Eosinophil % : 1.4 %  Auto Basophil % : 0.7 %    05-06    142  |  108  |  22<H>  ----------------------------<  122<H>  3.7   |  24  |  1.1    Ca    8.8      06 May 2023 00:39    TPro  6.7  /  Alb  4.1  /  TBili  1.4<H>  /  DBili  x   /  AST  17  /  ALT  17  /  AlkPhos  67  05-06    LIVER FUNCTIONS - ( 06 May 2023 00:39 )  Alb: 4.1 g/dL / Pro: 6.7 g/dL / ALK PHOS: 67 U/L / ALT: 17 U/L / AST: 17 U/L / GGT: x           PT/INR - ( 06 May 2023 00:39 )   PT: 12.90 sec;   INR: 1.13 ratio         PTT - ( 06 May 2023 00:39 )  PTT:34.3 sec        Neuroimaging:  NCHCT: CT Head No Cont:   ACC: [id number] EXAM:  CT BRAIN   ORDERED BY: PREM BACON     PROCEDURE DATE:  05/06/2023          INTERPRETATION:  CLINICAL HISTORY: Right upper extremity weakness    COMPARISON: CT head 4/15/2023 and MRI brain 2/22/2023.    TECHNIQUE: Multiple axial CT images of the head were obtained with   sagittal and coronal reformats without the administration of IV contrast.      FINDINGS:  Redemonstration of hypodensity in the left posterior limb internal   capsule reflecting chronic ischemic infarct. Previously described chronic   lacunar infarcts in the right thalamus and chelly are better seen on MRI   brain 2/22/2023.    Ventricles and cortical sulcal pattern are within normal limits.    No acute acute territorial infarct or intracranial hemorrhage, midline   shift, or space-occupying lesion.    No depressed calvarial fractures. The mastoid air cells are aerated   bilaterally. Polyp versus mucosal retention cyst in the left maxillary   sinus.      IMPRESSION:  1. No acute intracranial pathology.  2. Stable chronic left posterior limb internal capsule infarct.    --- End of Report ---            ADY GOVEA MD; Attending Radiologist  This document has been electronically signed. May  6 2023  1:14AM (05-06-23 @ 01:07)      05-06-23 @ 02:01

## 2023-05-09 ENCOUNTER — NON-APPOINTMENT (OUTPATIENT)
Age: 48
End: 2023-05-09

## 2023-06-21 ENCOUNTER — APPOINTMENT (OUTPATIENT)
Dept: INTERNAL MEDICINE | Facility: CLINIC | Age: 48
End: 2023-06-21

## 2023-10-23 NOTE — DISCHARGE NOTE PROVIDER - NSDCCPCAREPLAN_GEN_ALL_CORE_FT
No PRINCIPAL DISCHARGE DIAGNOSIS  Diagnosis: Stroke  Assessment and Plan of Treatment: You had a stroke of unclear etiology. We started you on stroke preventative medications and recommend that you see a cardiologist for a echo of your heart within 1 week. Other labs were sent out to work you up for any genetic disease. These labs wiil be discussed with you at you follow up appointment.   Call 9-1-1 right away if you or someone else has any of these symptoms.  Sudden numbness or weakness in the face, arm, or leg, especially on one side of the body.  Sudden confusion, trouble speaking, or difficulty understanding speech.  Sudden trouble seeing in one or both eyes.  Sudden trouble walking, dizziness, loss of balance, or lack of coordination.  Sudden severe headache with no known cause.  Call 9-1-1 right away if you or someone else has any of these symptoms.

## 2023-12-27 ENCOUNTER — OUTPATIENT (OUTPATIENT)
Dept: OUTPATIENT SERVICES | Facility: HOSPITAL | Age: 48
LOS: 1 days | End: 2023-12-27
Payer: MEDICAID

## 2023-12-27 ENCOUNTER — APPOINTMENT (OUTPATIENT)
Dept: INTERNAL MEDICINE | Facility: CLINIC | Age: 48
End: 2023-12-27
Payer: MEDICAID

## 2023-12-27 VITALS
TEMPERATURE: 97 F | SYSTOLIC BLOOD PRESSURE: 170 MMHG | HEIGHT: 71 IN | DIASTOLIC BLOOD PRESSURE: 117 MMHG | BODY MASS INDEX: 28.14 KG/M2 | OXYGEN SATURATION: 97 % | HEART RATE: 73 BPM | WEIGHT: 201 LBS

## 2023-12-27 DIAGNOSIS — Z00.00 ENCOUNTER FOR GENERAL ADULT MEDICAL EXAMINATION WITHOUT ABNORMAL FINDINGS: ICD-10-CM

## 2023-12-27 DIAGNOSIS — Z86.73 PERSONAL HISTORY OF TRANSIENT ISCHEMIC ATTACK (TIA), AND CEREBRAL INFARCTION W/OUT RESIDUAL DEFICITS: ICD-10-CM

## 2023-12-27 DIAGNOSIS — D64.9 ANEMIA, UNSPECIFIED: ICD-10-CM

## 2023-12-27 DIAGNOSIS — B35.1 TINEA UNGUIUM: ICD-10-CM

## 2023-12-27 DIAGNOSIS — R20.0 ANESTHESIA OF SKIN: ICD-10-CM

## 2023-12-27 DIAGNOSIS — Z12.11 ENCOUNTER FOR SCREENING FOR MALIGNANT NEOPLASM OF COLON: ICD-10-CM

## 2023-12-27 DIAGNOSIS — I51.7 CARDIOMEGALY: ICD-10-CM

## 2023-12-27 DIAGNOSIS — I10 ESSENTIAL (PRIMARY) HYPERTENSION: ICD-10-CM

## 2023-12-27 PROCEDURE — 80053 COMPREHEN METABOLIC PANEL: CPT

## 2023-12-27 PROCEDURE — 83036 HEMOGLOBIN GLYCOSYLATED A1C: CPT

## 2023-12-27 PROCEDURE — 84443 ASSAY THYROID STIM HORMONE: CPT

## 2023-12-27 PROCEDURE — 99214 OFFICE O/P EST MOD 30 MIN: CPT

## 2023-12-27 PROCEDURE — 80061 LIPID PANEL: CPT

## 2023-12-27 RX ORDER — ATORVASTATIN CALCIUM 80 MG/1
80 TABLET, FILM COATED ORAL
Qty: 90 | Refills: 1 | Status: ACTIVE | COMMUNITY
Start: 1900-01-01 | End: 1900-01-01

## 2023-12-27 RX ORDER — CLOPIDOGREL BISULFATE 75 MG/1
75 TABLET, FILM COATED ORAL
Qty: 30 | Refills: 2 | Status: ACTIVE | COMMUNITY
Start: 1900-01-01 | End: 1900-01-01

## 2023-12-27 RX ORDER — VALSARTAN 160 MG/1
160 TABLET, COATED ORAL DAILY
Qty: 60 | Refills: 0 | Status: ACTIVE | COMMUNITY
Start: 2023-03-31 | End: 1900-01-01

## 2023-12-27 RX ORDER — AMLODIPINE BESYLATE 5 MG/1
5 TABLET ORAL DAILY
Qty: 60 | Refills: 2 | Status: ACTIVE | COMMUNITY
Start: 2023-03-31 | End: 1900-01-01

## 2023-12-27 NOTE — PHYSICAL EXAM
[Well Developed] : well developed [Well Nourished] : well nourished [Normal Conjunctiva] : normal conjunctiva [Normal Venous Pressure] : normal venous pressure [No Carotid Bruit] : no carotid bruit [No Murmur] : no murmur [No Rub] : no rub [No Gallop] : no gallop [Clear Lung Fields] : clear lung fields [Good Air Entry] : good air entry [No Masses/organomegaly] : no masses/organomegaly [Normal Bowel Sounds] : normal bowel sounds [Normal Gait] : normal gait [No Cyanosis] : no cyanosis [No Clubbing] : no clubbing [No Varicosities] : no varicosities [No Rash] : no rash [No Skin Lesions] : no skin lesions [Moves all extremities] : moves all extremities [Normal Speech] : normal speech [Alert and Oriented] : alert and oriented [Normal memory] : normal memory [No Focal Deficits] : no focal deficits [No Acute Distress] : no acute distress [Normal Sclera/Conjunctiva] : normal sclera/conjunctiva [Normal Outer Ear/Nose] : the outer ears and nose were normal in appearance [Supple] : supple [No Respiratory Distress] : no respiratory distress  [Clear to Auscultation] : lungs were clear to auscultation bilaterally [Normal Rate] : normal rate  [Normal S1, S2] : normal S1 and S2 [No Carotid Bruits] : no carotid bruits [No Edema] : there was no peripheral edema [Soft] : abdomen soft [Non Tender] : non-tender [Non-distended] : non-distended [No HSM] : no HSM [de-identified] : no carotid bruit [de-identified] : strength 3/5 on right knee flexion and right knee extension

## 2023-12-27 NOTE — HISTORY OF PRESENT ILLNESS
[de-identified] : 48-year-old man with history of CVA x2  2019 s/p tPA and 2/2023 and HTN presenting . He still notes c/o dyspnea on exertion and occasional headaches. He notes that he has been out of his bp medications and requires a refill. He notes that his headaches are worse in the morning w/slight improvement w/tylenol. He describes the headache as a diffuse headache w/a throbbing sensation. He notes that since his last CVA in feb 2023 he has been experiencing weakness in the right lower extremity with what he describes as a foot drop. He notes that on Sunday 12/24 patient lost his balance due to a foot drop on the right side and hit his head on the right  fronto-temporal region. He denies any prodromal blurred vision or phonophobia but notes an episode of dizziness prior to the fall, not associated w/orthostatics or vision changes. Patient states that he would like to see neurology and would also like to see podiatry for a broken/fungal infection toenail.   Patient denies any drug use smoking and alcohol consumption  No other complaints, no chest pain, no n/v/d, no melena. no hematochezia.   [FreeTextEntry1] : follow up

## 2023-12-27 NOTE — END OF VISIT
[] : Resident [FreeTextEntry3] : Pt was advised to monitor BP at home and f/up in office in 1 month.

## 2023-12-27 NOTE — REVIEW OF SYSTEMS
[Feeling Fatigued] : feeling fatigued [Dyspnea on exertion] : dyspnea during exertion [Negative] : Heme/Lymph [Shortness Of Breath] : shortness of breath [FreeTextEntry6] : dyspnea on exertion

## 2023-12-27 NOTE — ASSESSMENT
[FreeTextEntry1] : Encounter for preventive health examination (V70.0) (Z00.00) Benign essential hypertension (401.1) (I10) CVA (cerebral vascular accident) (434.91) (I63.9) History of cerebrovascular accident (V12.54) (Z86.73) 48-year-old man with history of CVA 2019 s/p tPA and HTN presenting to establish care.  #HTN - uncontrolled - work up for 2ry HTN negative (PRA, serum metanephrines, TSH) negative - bp today 170/117 - on amlodipine-valsartan 5/160mg qd today - salt restriction - monitor BP at home - states that he has been out of medications for the past 3 weeks   #Exertional dyspnea - walks half a block -TTE in 2019: moderate LVH, grade 1DDx - has repeat TTE scheduled for april - Pro-BNP -ve - exertional dyspnea likely related to uncontrolled HTN - can not rule out stable angina - follows with cardiology and will probably need functional/structural testing  #Recurrent CVAs #numbness of RUE and RLE post cva - c/w ASA and Plavix - c/w atorvastatin - LDL 51 in march 2023 - A1c 4.8 in march, was 5.2% before - Lipoprotein A: 212 (high) - Hypercoaguable work up unrevealing - control BP - f/u with neurology, referral given and f/u w/PT - Patient was admitted 2/2023. Initially he went to [facility] where CTH was done but he left AMA prior to hearing results. He continued to have slurred speech and left facial numbness and underwent MR head showing acute internal capsule infarct and chronic lacunar infarcts. He did not undergo TTE and was referred for outpatient follow up. - CTA H/N 2/2023: - Severe stenoses of the bilateral proximal P2 segments of the PCA - Moderate stenosis of the proximal superior M2 division of the left MCA  MR Brain 2/2023 - Focal acute infarct involving the left posterior limb of the internal capsule. No evidence of hemorrhage. - Chronic lacunar infarcts involving the right thalamus as well as the chelly as well as mild chronic microvascular type changes.  Carotid Duplex 8/2019 - 40-59% R ICA - 20-39% L ICA - TTE 2019 - Normal biventricular function, moderate concentric LVH, G1DD - TTE in April 2023:  1. The left ventricular systolic function is normal with an ejection fraction of 60%. There is normal left ventricular diastolic function. Mild concentric hypertrophy with normal left ventricular cavity size. There are no regional wall motion abnormalities seen. Global longidutinal strain is -19.2%, indicating normal systolic function. 2. Normal right ventricular cavity size and normal wall thickness with normal systolic function. 3. The left atrium is mildly dilated. 4. No significant valvular disease. 5. Pulmonary artery systolic pressure could not be estimated due to insufficient tricuspid regurgitation. 6. No pericardial effusion seen. 7. The proximal ascending aorta is mildly dilated (3.8cm).  Labs: March 2023 - lp(a) 213,  - A1c 4.8, TSH 0.31 in March 2023 - , TG 50, HDL 53, LDL 51, non-HDL 61 - Hgb 10.9, Plt 156, Cr 1.1, K 3.8  - RTC in 3 months with labs - screening colonoscopy, patient is agreeable  - podiatry referral for toe nail  - neurology referral for post cva management  - PT referral for post-cva deficits .

## 2023-12-28 ENCOUNTER — NON-APPOINTMENT (OUTPATIENT)
Age: 48
End: 2023-12-28

## 2023-12-28 DIAGNOSIS — R06.02 SHORTNESS OF BREATH: ICD-10-CM

## 2023-12-28 LAB
ALBUMIN SERPL ELPH-MCNC: 4.3 G/DL
ALP BLD-CCNC: 79 U/L
ALT SERPL-CCNC: 14 U/L
ANION GAP SERPL CALC-SCNC: 12 MMOL/L
AST SERPL-CCNC: 19 U/L
BILIRUB SERPL-MCNC: 1.3 MG/DL
BUN SERPL-MCNC: 15 MG/DL
CALCIUM SERPL-MCNC: 9.5 MG/DL
CHLORIDE SERPL-SCNC: 103 MMOL/L
CHOLEST SERPL-MCNC: 173 MG/DL
CO2 SERPL-SCNC: 26 MMOL/L
CREAT SERPL-MCNC: 1 MG/DL
EGFR: 92 ML/MIN/1.73M2
ESTIMATED AVERAGE GLUCOSE: 105 MG/DL
GLUCOSE SERPL-MCNC: 91 MG/DL
HBA1C MFR BLD HPLC: 5.3 %
HDLC SERPL-MCNC: 52 MG/DL
LDLC SERPL CALC-MCNC: 102 MG/DL
NONHDLC SERPL-MCNC: 121 MG/DL
POTASSIUM SERPL-SCNC: 3.8 MMOL/L
PROT SERPL-MCNC: 7.5 G/DL
SODIUM SERPL-SCNC: 141 MMOL/L
TRIGL SERPL-MCNC: 95 MG/DL
TSH SERPL-ACNC: 0.45 UIU/ML

## 2024-01-02 DIAGNOSIS — B35.1 TINEA UNGUIUM: ICD-10-CM

## 2024-01-02 DIAGNOSIS — Z12.11 ENCOUNTER FOR SCREENING FOR MALIGNANT NEOPLASM OF COLON: ICD-10-CM

## 2024-01-02 DIAGNOSIS — I10 ESSENTIAL (PRIMARY) HYPERTENSION: ICD-10-CM

## 2024-01-02 DIAGNOSIS — I51.7 CARDIOMEGALY: ICD-10-CM

## 2024-01-02 DIAGNOSIS — D64.9 ANEMIA, UNSPECIFIED: ICD-10-CM

## 2024-01-02 DIAGNOSIS — R20.0 ANESTHESIA OF SKIN: ICD-10-CM

## 2024-01-02 NOTE — SPEECH LANGUAGE PATHOLOGY EVALUATION - SLP ANTICIPATED DISCHARGE DISPOSITION
Interval History: NAEON. Afebrile. HDS. NGT inadvertently removed overnight, 450cc out prior to removal (100 overnight). Denies n/v. Multiple BM overnight. Pain is controlled with current regimen. No new symptoms or concerns this morning. All questions answered.       Medications:  Continuous Infusions:   lactated ringers 125 mL/hr at 01/02/24 0108     Scheduled Meds:   enoxparin  40 mg Subcutaneous Daily     PRN Meds:dextrose 10%, glucagon (human recombinant), hydrALAZINE, HYDROmorphone, HYDROmorphone, insulin aspart U-100, LIDOcaine (PF) 10 mg/ml (1%), ondansetron, prochlorperazine     Review of patient's allergies indicates:  No Known Allergies  Objective:     Vital Signs (Most Recent):  Temp: 98.8 °F (37.1 °C) (01/02/24 0430)  Pulse: 83 (01/02/24 0430)  Resp: 18 (01/02/24 0430)  BP: 137/64 (01/02/24 0430)  SpO2: 100 % (01/02/24 0430) Vital Signs (24h Range):  Temp:  [97.5 °F (36.4 °C)-99.2 °F (37.3 °C)] 98.8 °F (37.1 °C)  Pulse:  [70-84] 83  Resp:  [16-18] 18  SpO2:  [93 %-100 %] 100 %  BP: (113-164)/(57-72) 137/64     Weight: 52.2 kg (115 lb)  Body mass index is 21.03 kg/m².    Intake/Output - Last 3 Shifts         12/31 0700  01/01 0659 01/01 0700  01/02 0659 01/02 0700  01/03 0659    P.O.  0     Total Intake(mL/kg)  0 (0)     Urine (mL/kg/hr)  0 (0)     Drains  450     Total Output  450     Net  -450            Urine Occurrence  2 x              Physical Exam  Vitals and nursing note reviewed.   Constitutional:       General: She is not in acute distress.     Appearance: She is not diaphoretic.      Comments: Room air   HENT:      Head: Normocephalic and atraumatic.      Mouth/Throat:      Mouth: Mucous membranes are moist.      Pharynx: Oropharynx is clear.   Eyes:      Extraocular Movements: Extraocular movements intact.      Conjunctiva/sclera: Conjunctivae normal.   Cardiovascular:      Rate and Rhythm: Normal rate.   Pulmonary:      Effort: Pulmonary effort is normal. No respiratory distress.    Abdominal:      General: There is no distension.      Palpations: Abdomen is soft.      Tenderness: There is no abdominal tenderness. There is no guarding or rebound.   Musculoskeletal:         General: No deformity.      Cervical back: Normal range of motion.   Skin:     General: Skin is warm and dry.   Neurological:      Mental Status: She is alert and oriented to person, place, and time.          Significant Labs:  I have reviewed all pertinent lab results within the past 24 hours.    Significant Diagnostics:  I have reviewed all pertinent imaging results/findings within the past 24 hours.   home w/ outpatient services

## 2024-01-04 ENCOUNTER — APPOINTMENT (OUTPATIENT)
Dept: PODIATRY | Facility: CLINIC | Age: 49
End: 2024-01-04

## 2024-01-10 ENCOUNTER — APPOINTMENT (OUTPATIENT)
Dept: INTERNAL MEDICINE | Facility: CLINIC | Age: 49
End: 2024-01-10

## 2024-01-10 NOTE — STROKE CODE NOTE - CAPILLARY BLOOD GLUCOSE (MG/DL) (70-99)
I called Brian Christensen at [phone] at 16:43 EST     There was no answer so I left office number for call back.   [phone]

## 2024-01-25 ENCOUNTER — APPOINTMENT (OUTPATIENT)
Dept: PODIATRY | Facility: CLINIC | Age: 49
End: 2024-01-25

## 2024-03-27 ENCOUNTER — APPOINTMENT (OUTPATIENT)
Dept: PODIATRY | Facility: CLINIC | Age: 49
End: 2024-03-27

## 2024-03-27 ENCOUNTER — APPOINTMENT (OUTPATIENT)
Dept: INTERNAL MEDICINE | Facility: CLINIC | Age: 49
End: 2024-03-27

## 2024-04-04 ENCOUNTER — APPOINTMENT (OUTPATIENT)
Dept: NEUROLOGY | Facility: CLINIC | Age: 49
End: 2024-04-04

## 2024-05-22 ENCOUNTER — APPOINTMENT (OUTPATIENT)
Dept: GASTROENTEROLOGY | Facility: CLINIC | Age: 49
End: 2024-05-22

## 2024-06-25 ENCOUNTER — APPOINTMENT (OUTPATIENT)
Dept: NEUROLOGY | Facility: CLINIC | Age: 49
End: 2024-06-25

## 2024-07-09 ENCOUNTER — EMERGENCY (EMERGENCY)
Facility: HOSPITAL | Age: 49
LOS: 0 days | Discharge: HOME | End: 2024-07-09
Attending: EMERGENCY MEDICINE
Payer: MEDICAID

## 2024-07-09 VITALS
OXYGEN SATURATION: 100 % | SYSTOLIC BLOOD PRESSURE: 179 MMHG | HEART RATE: 80 BPM | RESPIRATION RATE: 18 BRPM | TEMPERATURE: 98 F | WEIGHT: 195.11 LBS | DIASTOLIC BLOOD PRESSURE: 100 MMHG

## 2024-07-09 DIAGNOSIS — K08.89 OTHER SPECIFIED DISORDERS OF TEETH AND SUPPORTING STRUCTURES: ICD-10-CM

## 2024-07-09 PROCEDURE — 99283 EMERGENCY DEPT VISIT LOW MDM: CPT

## 2024-07-09 RX ADMIN — Medication 600 MILLIGRAM(S): at 14:27

## 2024-07-29 NOTE — ED ADULT NURSE NOTE - CHIEF COMPLAINT
Body Location Override (Optional): Nasal tip
The patient is a 43y Male complaining of pain, upper leg.

## 2024-09-05 NOTE — SPEECH LANGUAGE PATHOLOGY EVALUATION - SLP ORIENTATION
Pt has covid.  Low grade temp, sinus congestion, and cough.  They've been giving her Tylenol.  Is there anything else to suggest due to her age?   x4

## 2024-11-11 ENCOUNTER — APPOINTMENT (OUTPATIENT)
Dept: INTERNAL MEDICINE | Facility: CLINIC | Age: 49
End: 2024-11-11

## 2024-11-11 NOTE — ED PROVIDER NOTE - ATTENDING APP SHARED VISIT CONTRIBUTION OF CARE
Primary team ED 47 year old male, pmhx CVA in the past s/p TPA, HTN presenting with acute onset of R facial droop and slurred speech that began 4pm yesterday. Patient was seen at [facility] at that time at which time a stroke code was called and patient reportedly had a negative CT at that time but left AMA prior to completion of work up. States today he is having a mild headache and persistent symptoms so came to ED at [facility] for evaluation. Otherwise denies fevers, neck stiffness, vision changes, unilateral weakness/numbness, N/V or any other complaints.    Vital Signs: I have reviewed the initial vital signs.  Constitutional: NAD, well-nourished, appears stated age, no acute distress.  HEENT: Airway patent, moist MM, no erythema/swelling/deformity of oral structures. EOMI, PERRLA.  CV: regular rate, regular rhythm, well-perfused extremities, 2+ b/l DP and radial pulses equal.  Lungs: BCTA, no increased WOB.  ABD: NTND, no guarding or rebound, no pulsatile mass, no hernias.   MSK: Neck supple, nontender, nl ROM, no stepoff. Chest nontender. Back nontender in TLS spine or to b/l bony structures or flanks. Ext nontender, nl rom, no deformity.   INTEG: Skin warm, dry, no rash.  NEURO: A&Ox3, normal strength, nl sensation throughout, normal speech.   PSYCH: Calm, cooperative, normal affect and interaction.    Will obtain labs, CT head/CTA head/neck, consult neuro, re-eval.

## 2025-01-07 ENCOUNTER — APPOINTMENT (OUTPATIENT)
Dept: NEUROLOGY | Facility: CLINIC | Age: 50
End: 2025-01-07

## 2025-01-15 ENCOUNTER — EMERGENCY (EMERGENCY)
Facility: HOSPITAL | Age: 50
LOS: 0 days | Discharge: HOME | End: 2025-01-15
Attending: EMERGENCY MEDICINE
Payer: MEDICAID

## 2025-01-15 VITALS
SYSTOLIC BLOOD PRESSURE: 189 MMHG | HEART RATE: 70 BPM | RESPIRATION RATE: 18 BRPM | DIASTOLIC BLOOD PRESSURE: 98 MMHG | OXYGEN SATURATION: 99 %

## 2025-01-15 VITALS
TEMPERATURE: 98 F | SYSTOLIC BLOOD PRESSURE: 221 MMHG | DIASTOLIC BLOOD PRESSURE: 151 MMHG | WEIGHT: 214.95 LBS | HEART RATE: 67 BPM | HEIGHT: 70 IN | OXYGEN SATURATION: 98 % | RESPIRATION RATE: 20 BRPM

## 2025-01-15 LAB
ALBUMIN SERPL ELPH-MCNC: 4.2 G/DL — SIGNIFICANT CHANGE UP (ref 3.5–5.2)
ALP SERPL-CCNC: 75 U/L — SIGNIFICANT CHANGE UP (ref 30–115)
ALT FLD-CCNC: 15 U/L — SIGNIFICANT CHANGE UP (ref 0–41)
ANION GAP SERPL CALC-SCNC: 9 MMOL/L — SIGNIFICANT CHANGE UP (ref 7–14)
APTT BLD: 36.7 SEC — SIGNIFICANT CHANGE UP (ref 27–39.2)
AST SERPL-CCNC: 48 U/L — HIGH (ref 0–41)
BASOPHILS # BLD AUTO: 0.03 K/UL — SIGNIFICANT CHANGE UP (ref 0–0.2)
BASOPHILS NFR BLD AUTO: 0.6 % — SIGNIFICANT CHANGE UP (ref 0–1)
BILIRUB SERPL-MCNC: 1.2 MG/DL — SIGNIFICANT CHANGE UP (ref 0.2–1.2)
BUN SERPL-MCNC: 13 MG/DL — SIGNIFICANT CHANGE UP (ref 10–20)
CALCIUM SERPL-MCNC: 8.8 MG/DL — SIGNIFICANT CHANGE UP (ref 8.4–10.4)
CHLORIDE SERPL-SCNC: 105 MMOL/L — SIGNIFICANT CHANGE UP (ref 98–110)
CO2 SERPL-SCNC: 25 MMOL/L — SIGNIFICANT CHANGE UP (ref 17–32)
CREAT SERPL-MCNC: 1.1 MG/DL — SIGNIFICANT CHANGE UP (ref 0.7–1.5)
EGFR: 82 ML/MIN/1.73M2 — SIGNIFICANT CHANGE UP
EOSINOPHIL # BLD AUTO: 0.05 K/UL — SIGNIFICANT CHANGE UP (ref 0–0.7)
EOSINOPHIL NFR BLD AUTO: 1 % — SIGNIFICANT CHANGE UP (ref 0–8)
GLUCOSE SERPL-MCNC: 96 MG/DL — SIGNIFICANT CHANGE UP (ref 70–99)
HCT VFR BLD CALC: 37.6 % — LOW (ref 42–52)
HGB BLD-MCNC: 11.3 G/DL — LOW (ref 14–18)
IMM GRANULOCYTES NFR BLD AUTO: 0.2 % — SIGNIFICANT CHANGE UP (ref 0.1–0.3)
INR BLD: 0.97 RATIO — SIGNIFICANT CHANGE UP (ref 0.65–1.3)
LYMPHOCYTES # BLD AUTO: 1.13 K/UL — LOW (ref 1.2–3.4)
LYMPHOCYTES # BLD AUTO: 21.6 % — SIGNIFICANT CHANGE UP (ref 20.5–51.1)
MCHC RBC-ENTMCNC: 19.3 PG — LOW (ref 27–31)
MCHC RBC-ENTMCNC: 30.1 G/DL — LOW (ref 32–37)
MCV RBC AUTO: 64.2 FL — LOW (ref 80–94)
MONOCYTES # BLD AUTO: 0.34 K/UL — SIGNIFICANT CHANGE UP (ref 0.1–0.6)
MONOCYTES NFR BLD AUTO: 6.5 % — SIGNIFICANT CHANGE UP (ref 1.7–9.3)
NEUTROPHILS # BLD AUTO: 3.66 K/UL — SIGNIFICANT CHANGE UP (ref 1.4–6.5)
NEUTROPHILS NFR BLD AUTO: 70.1 % — SIGNIFICANT CHANGE UP (ref 42.2–75.2)
NRBC # BLD: 0 /100 WBCS — SIGNIFICANT CHANGE UP (ref 0–0)
PLATELET # BLD AUTO: 155 K/UL — SIGNIFICANT CHANGE UP (ref 130–400)
PMV BLD: SIGNIFICANT CHANGE UP (ref 7.4–10.4)
POTASSIUM SERPL-MCNC: 5.3 MMOL/L — HIGH (ref 3.5–5)
POTASSIUM SERPL-SCNC: 5.3 MMOL/L — HIGH (ref 3.5–5)
PROT SERPL-MCNC: 7.2 G/DL — SIGNIFICANT CHANGE UP (ref 6–8)
PROTHROM AB SERPL-ACNC: 11.4 SEC — SIGNIFICANT CHANGE UP (ref 9.95–12.87)
RBC # BLD: 5.86 M/UL — SIGNIFICANT CHANGE UP (ref 4.7–6.1)
RBC # FLD: 19.1 % — HIGH (ref 11.5–14.5)
SODIUM SERPL-SCNC: 139 MMOL/L — SIGNIFICANT CHANGE UP (ref 135–146)
TROPONIN T, HIGH SENSITIVITY RESULT: 8 NG/L — SIGNIFICANT CHANGE UP (ref 6–21)
TROPONIN T, HIGH SENSITIVITY RESULT: 9 NG/L — SIGNIFICANT CHANGE UP (ref 6–21)
WBC # BLD: 5.22 K/UL — SIGNIFICANT CHANGE UP (ref 4.8–10.8)
WBC # FLD AUTO: 5.22 K/UL — SIGNIFICANT CHANGE UP (ref 4.8–10.8)

## 2025-01-15 PROCEDURE — 93005 ELECTROCARDIOGRAM TRACING: CPT

## 2025-01-15 PROCEDURE — 99285 EMERGENCY DEPT VISIT HI MDM: CPT | Mod: 25

## 2025-01-15 PROCEDURE — 80053 COMPREHEN METABOLIC PANEL: CPT

## 2025-01-15 PROCEDURE — 93010 ELECTROCARDIOGRAM REPORT: CPT

## 2025-01-15 PROCEDURE — 70450 CT HEAD/BRAIN W/O DYE: CPT | Mod: MC

## 2025-01-15 PROCEDURE — 71045 X-RAY EXAM CHEST 1 VIEW: CPT

## 2025-01-15 PROCEDURE — 71045 X-RAY EXAM CHEST 1 VIEW: CPT | Mod: 26

## 2025-01-15 PROCEDURE — 84484 ASSAY OF TROPONIN QUANT: CPT

## 2025-01-15 PROCEDURE — 99285 EMERGENCY DEPT VISIT HI MDM: CPT

## 2025-01-15 PROCEDURE — 96374 THER/PROPH/DIAG INJ IV PUSH: CPT

## 2025-01-15 PROCEDURE — 70450 CT HEAD/BRAIN W/O DYE: CPT | Mod: 26

## 2025-01-15 PROCEDURE — 85025 COMPLETE CBC W/AUTO DIFF WBC: CPT

## 2025-01-15 PROCEDURE — 85610 PROTHROMBIN TIME: CPT

## 2025-01-15 PROCEDURE — 36415 COLL VENOUS BLD VENIPUNCTURE: CPT

## 2025-01-15 PROCEDURE — 85730 THROMBOPLASTIN TIME PARTIAL: CPT

## 2025-01-15 RX ORDER — LABETALOL HCL 300 MG/1
10 TABLET, FILM COATED ORAL ONCE
Refills: 0 | Status: COMPLETED | OUTPATIENT
Start: 2025-01-15 | End: 2025-01-15

## 2025-01-15 RX ORDER — ACETAMINOPHEN 80 MG/.8ML
975 SOLUTION/ DROPS ORAL ONCE
Refills: 0 | Status: COMPLETED | OUTPATIENT
Start: 2025-01-15 | End: 2025-01-15

## 2025-01-15 RX ADMIN — Medication 10 MILLIGRAM(S): at 17:04

## 2025-01-15 RX ADMIN — ACETAMINOPHEN 975 MILLIGRAM(S): 80 SOLUTION/ DROPS ORAL at 15:43

## 2025-01-15 NOTE — ED ADULT NURSE NOTE - NSFALLUNIVINTERV_ED_ALL_ED
Bed/Stretcher in lowest position, wheels locked, appropriate side rails in place/Call bell, personal items and telephone in reach/Instruct patient to call for assistance before getting out of bed/chair/stretcher/Non-slip footwear applied when patient is off stretcher/[place] to call system/Physically safe environment - no spills, clutter or unnecessary equipment/Purposeful proactive rounding/Room/bathroom lighting operational, light cord in reach

## 2025-01-15 NOTE — ED PROVIDER NOTE - PATIENT PORTAL LINK FT
You can access the FollowMyHealth Patient Portal offered by [facility] by registering at the following website: [email or web address]. By joining [facility]’s FollowMyHealth portal, you will also be able to view your health information using other applications (apps) compatible with our system.

## 2025-01-15 NOTE — ED PROVIDER NOTE - NSFOLLOWUPINSTRUCTIONS_ED_ALL_ED_FT
Follow up with PMD.    Hypertension    Hypertension, commonly called high blood pressure, is when the force of blood pumping through your arteries is too strong. Hypertension forces your heart to work harder to pump blood. Your arteries may become narrow or stiff. Having untreated or uncontrolled hypertension for a long period of time can cause heart attack, stroke, kidney disease, and other problems. If started on a medication, take exactly as prescribed by your health care professional. Maintain a healthy lifestyle and follow up with your primary care physician.    SEEK IMMEDIATE MEDICAL CARE IF YOU HAVE ANY OF THE FOLLOWING SYMPTOMS: severe headache, confusion, chest pain, abdominal pain, vomiting, or shortness of breath.

## 2025-01-15 NOTE — ED PROVIDER NOTE - CLINICAL SUMMARY MEDICAL DECISION MAKING FREE TEXT BOX
50 y/o male with h/o htn, dm ,cva, in ER with c/o elevated BP. Ran out of his amlodipine 3 days ago.  Pt states he developed a HA a few days ago, which he gets when his BP is elevated.  Pt went to [facility], was treated for elevated BP, but states they didn't refill his BP med.  Today again developed similar type HA and so came to ER.  no visual changes.  no syncope/near syncope.  no motor weakness or paresthesias.  no difficulty wtih speech or ambulation.  no cp/sob, no palpitations.  no abd pain.  no n/v/d.  PE - nad, nc/at, eomi, perrl, op - clear, mmm, neck supple, cta b/l, no w/r/r, rrr, abd- soft, nt/nd, nabs, from x 4, no LE swelling/tenderness, A&O x 3, cn 2-12 intact, motor 5/5 b/l UE and LE, sensation intact x 4, + steady gait.    -Labs reivewed: cbc with mild anemia, CMP unremarkable, HST 8 > 9 on repeat. CT head neg.  EKG: nsr @ 61 with 1st degree avb, LVH, prwp, QTc 412. CXR: dede.  Initial BP in triage 221/151 > 189/98 on repeat.  Pt's HA resolved.  Will send rx for amlodipine 10 mg QD, pt to keep BP log at home and f/u with his pmd.  Told to return to ER for return of HA, dizziness, cp/sob, leg swelling, or any other new/concerning symptoms.  pt understands and agrees with plan.

## 2025-01-15 NOTE — ED ADULT NURSE NOTE - ALCOHOL PRE SCREEN (AUDIT - C)
neuro checks q1hr   monitor for CSF leak   HOB 30-45   MRI one shot today  Case discussed with attending neurosurgeon. Statement Selected

## 2025-01-15 NOTE — ED PROVIDER NOTE - OBJECTIVE STATEMENT
49-year-old male past medical history of CVA, hypertension, diabetes presents for evaluation of elevated blood pressure.  Patient states he ran out of his Norvasc 4 days ago and has been unable to fill the prescription.  Yesterday developed headache and went to [facility] was found to be hypertensive given medic occasions through an IV and discharged follow-up with his PMD.  Today patient developed headache again leading to visit to ED.  Here is blood pressure is 250/150.  Denies fever, vision change, neck pain, chest pain, shortness of breath, abdominal pain, dysuria, hematuria, vomiting, diarrhea.

## 2025-01-15 NOTE — ED ADULT NURSE NOTE - OBJECTIVE STATEMENT
pt complaining of HTN, ran out of his BP meds, and was seen yesterday at [facility] for similar complaint.

## 2025-05-07 ENCOUNTER — APPOINTMENT (OUTPATIENT)
Dept: INTERNAL MEDICINE | Facility: CLINIC | Age: 50
End: 2025-05-07

## 2025-05-12 ENCOUNTER — OUTPATIENT (OUTPATIENT)
Dept: OUTPATIENT SERVICES | Facility: HOSPITAL | Age: 50
LOS: 1 days | End: 2025-05-12
Payer: MEDICAID

## 2025-05-12 ENCOUNTER — APPOINTMENT (OUTPATIENT)
Dept: INTERNAL MEDICINE | Facility: CLINIC | Age: 50
End: 2025-05-12
Payer: MEDICAID

## 2025-05-12 VITALS
WEIGHT: 197 LBS | BODY MASS INDEX: 27.58 KG/M2 | HEIGHT: 71 IN | TEMPERATURE: 97.5 F | OXYGEN SATURATION: 100 % | HEART RATE: 83 BPM | SYSTOLIC BLOOD PRESSURE: 185 MMHG | DIASTOLIC BLOOD PRESSURE: 126 MMHG

## 2025-05-12 DIAGNOSIS — I10 ESSENTIAL (PRIMARY) HYPERTENSION: ICD-10-CM

## 2025-05-12 DIAGNOSIS — Z00.00 ENCOUNTER FOR GENERAL ADULT MEDICAL EXAMINATION WITHOUT ABNORMAL FINDINGS: ICD-10-CM

## 2025-05-12 PROCEDURE — 99214 OFFICE O/P EST MOD 30 MIN: CPT

## 2025-05-13 ENCOUNTER — LABORATORY RESULT (OUTPATIENT)
Age: 50
End: 2025-05-13

## 2025-05-13 ENCOUNTER — OUTPATIENT (OUTPATIENT)
Dept: OUTPATIENT SERVICES | Facility: HOSPITAL | Age: 50
LOS: 1 days | End: 2025-05-13

## 2025-05-13 DIAGNOSIS — Z00.00 ENCOUNTER FOR GENERAL ADULT MEDICAL EXAMINATION WITHOUT ABNORMAL FINDINGS: ICD-10-CM

## 2025-05-14 DIAGNOSIS — Z00.00 ENCOUNTER FOR GENERAL ADULT MEDICAL EXAMINATION WITHOUT ABNORMAL FINDINGS: ICD-10-CM

## 2025-05-16 DIAGNOSIS — Z00.00 ENCOUNTER FOR GENERAL ADULT MEDICAL EXAMINATION W/OUT ABNORMAL FINDINGS: ICD-10-CM

## 2025-05-19 LAB
ALBUMIN SERPL ELPH-MCNC: 4.3 G/DL
ALP BLD-CCNC: 86 U/L
ALT SERPL-CCNC: 12 U/L
ANION GAP SERPL CALC-SCNC: 9 MMOL/L
AST SERPL-CCNC: 19 U/L
BILIRUB SERPL-MCNC: 1.2 MG/DL
BUN SERPL-MCNC: 15 MG/DL
CALCIUM SERPL-MCNC: 9 MG/DL
CHLORIDE SERPL-SCNC: 102 MMOL/L
CHOLEST SERPL-MCNC: 155 MG/DL
CO2 SERPL-SCNC: 29 MMOL/L
CREAT SERPL-MCNC: 1.2 MG/DL
CREAT SPEC-SCNC: 239 MG/DL
CREAT/PROT UR: 0.1 RATIO
EGFRCR SERPLBLD CKD-EPI 2021: 74 ML/MIN/1.73M2
ESTIMATED AVERAGE GLUCOSE: 105 MG/DL
GLUCOSE SERPL-MCNC: 98 MG/DL
HBA1C MFR BLD HPLC: 5.3 %
HCT VFR BLD CALC: 35.6 %
HDLC SERPL-MCNC: 48 MG/DL
HGB BLD-MCNC: 10.7 G/DL
LDLC SERPL-MCNC: 90 MG/DL
MCHC RBC-ENTMCNC: 19.4 PG
MCHC RBC-ENTMCNC: 30.1 G/DL
MCV RBC AUTO: 64.6 FL
NONHDLC SERPL-MCNC: 107 MG/DL
PLATELET # BLD AUTO: 165 K/UL
PMV BLD AUTO: 0 /100 WBCS
PMV BLD: NORMAL
POTASSIUM SERPL-SCNC: 3.7 MMOL/L
PROT SERPL-MCNC: 7.4 G/DL
PROT UR-MCNC: 17 MG/DL
RBC # BLD: 5.51 M/UL
RBC # FLD: 18.6 %
SODIUM SERPL-SCNC: 140 MMOL/L
TRIGL SERPL-MCNC: 90 MG/DL
TSH SERPL-ACNC: 0.54 UIU/ML
WBC # FLD AUTO: 4.51 K/UL

## 2025-05-20 ENCOUNTER — APPOINTMENT (OUTPATIENT)
Dept: NEUROLOGY | Facility: CLINIC | Age: 50
End: 2025-05-20
Payer: MEDICAID

## 2025-05-20 ENCOUNTER — OUTPATIENT (OUTPATIENT)
Dept: OUTPATIENT SERVICES | Facility: HOSPITAL | Age: 50
LOS: 1 days | End: 2025-05-20
Payer: MEDICAID

## 2025-05-20 VITALS
DIASTOLIC BLOOD PRESSURE: 122 MMHG | HEART RATE: 83 BPM | WEIGHT: 196.06 LBS | SYSTOLIC BLOOD PRESSURE: 182 MMHG | OXYGEN SATURATION: 100 % | BODY MASS INDEX: 27.35 KG/M2

## 2025-05-20 VITALS — DIASTOLIC BLOOD PRESSURE: 134 MMHG | SYSTOLIC BLOOD PRESSURE: 204 MMHG

## 2025-05-20 DIAGNOSIS — I63.9 CEREBRAL INFARCTION, UNSPECIFIED: ICD-10-CM

## 2025-05-20 DIAGNOSIS — Z00.00 ENCOUNTER FOR GENERAL ADULT MEDICAL EXAMINATION WITHOUT ABNORMAL FINDINGS: ICD-10-CM

## 2025-05-20 PROCEDURE — 99215 OFFICE O/P EST HI 40 MIN: CPT

## 2025-05-20 PROCEDURE — G2211 COMPLEX E/M VISIT ADD ON: CPT | Mod: NC

## 2025-05-22 DIAGNOSIS — I63.9 CEREBRAL INFARCTION, UNSPECIFIED: ICD-10-CM

## 2025-05-27 DIAGNOSIS — I10 ESSENTIAL (PRIMARY) HYPERTENSION: ICD-10-CM

## 2025-05-27 DIAGNOSIS — Z00.00 ENCOUNTER FOR GENERAL ADULT MEDICAL EXAMINATION WITHOUT ABNORMAL FINDINGS: ICD-10-CM

## 2025-06-06 NOTE — ED PROVIDER NOTE - NS ED ATTENDING STATEMENT MOD
I have personally seen and examined this patient.  I have fully participated in the care of this patient. I have reviewed all pertinent clinical information, including history, physical exam, plan and the Resident’s note and agree except as noted.
Statement Selected